# Patient Record
Sex: MALE | Race: WHITE | NOT HISPANIC OR LATINO | Employment: FULL TIME | URBAN - METROPOLITAN AREA
[De-identification: names, ages, dates, MRNs, and addresses within clinical notes are randomized per-mention and may not be internally consistent; named-entity substitution may affect disease eponyms.]

---

## 2015-12-31 LAB — HBA1C MFR BLD: 5.3 % (ref 4.8–5.6)

## 2018-02-06 ENCOUNTER — TRANSFERRED RECORDS (OUTPATIENT)
Dept: HEALTH INFORMATION MANAGEMENT | Facility: CLINIC | Age: 58
End: 2018-02-06

## 2018-10-29 ENCOUNTER — TRANSFERRED RECORDS (OUTPATIENT)
Dept: HEALTH INFORMATION MANAGEMENT | Facility: CLINIC | Age: 58
End: 2018-10-29

## 2019-02-08 ENCOUNTER — TRANSFERRED RECORDS (OUTPATIENT)
Dept: HEALTH INFORMATION MANAGEMENT | Facility: CLINIC | Age: 59
End: 2019-02-08

## 2019-04-09 ENCOUNTER — TRANSFERRED RECORDS (OUTPATIENT)
Dept: HEALTH INFORMATION MANAGEMENT | Facility: CLINIC | Age: 59
End: 2019-04-09

## 2019-10-28 ENCOUNTER — TRANSFERRED RECORDS (OUTPATIENT)
Dept: HEALTH INFORMATION MANAGEMENT | Facility: CLINIC | Age: 59
End: 2019-10-28

## 2019-10-28 LAB
CHOLEST SERPL-MCNC: 250 MG/DL (ref 0–200)
CREAT SERPL-MCNC: 1.4 MG/DL (ref 0.8–1.5)
GFR SERPL CREATININE-BSD FRML MDRD: 55 ML/MIN (ref 60–137)
GLUCOSE SERPL-MCNC: 101 MG/DL (ref 70–110)
HDLC SERPL-MCNC: 63 MG/DL (ref 34–100)
LDLC SERPL CALC-MCNC: 148 MG/DL (ref 75–130)
POTASSIUM SERPL-SCNC: 4.4 MMOL/L (ref 3.5–5.5)
TRIGL SERPL-MCNC: 195 MG/DL (ref 0–200)

## 2019-11-08 ENCOUNTER — TRANSFERRED RECORDS (OUTPATIENT)
Dept: HEALTH INFORMATION MANAGEMENT | Facility: CLINIC | Age: 59
End: 2019-11-08

## 2020-11-04 ENCOUNTER — OFFICE VISIT (OUTPATIENT)
Dept: FAMILY MEDICINE | Facility: CLINIC | Age: 60
End: 2020-11-04
Payer: COMMERCIAL

## 2020-11-04 VITALS
OXYGEN SATURATION: 99 % | HEIGHT: 74 IN | BODY MASS INDEX: 32.08 KG/M2 | WEIGHT: 250 LBS | TEMPERATURE: 96.9 F | HEART RATE: 86 BPM | SYSTOLIC BLOOD PRESSURE: 148 MMHG | DIASTOLIC BLOOD PRESSURE: 94 MMHG

## 2020-11-04 DIAGNOSIS — Z23 NEED FOR INFLUENZA VACCINATION: ICD-10-CM

## 2020-11-04 DIAGNOSIS — Z51.81 MEDICATION MONITORING ENCOUNTER: ICD-10-CM

## 2020-11-04 DIAGNOSIS — Z23 NEED FOR PNEUMOCOCCAL VACCINATION: ICD-10-CM

## 2020-11-04 DIAGNOSIS — Z12.11 SCREEN FOR COLON CANCER: ICD-10-CM

## 2020-11-04 DIAGNOSIS — E78.5 HYPERLIPIDEMIA LDL GOAL <130: ICD-10-CM

## 2020-11-04 DIAGNOSIS — Z00.00 ROUTINE GENERAL MEDICAL EXAMINATION AT A HEALTH CARE FACILITY: Primary | ICD-10-CM

## 2020-11-04 DIAGNOSIS — Z86.0100 HISTORY OF COLONIC POLYPS: ICD-10-CM

## 2020-11-04 DIAGNOSIS — M25.561 CHRONIC PAIN OF RIGHT KNEE: ICD-10-CM

## 2020-11-04 DIAGNOSIS — G89.29 CHRONIC PAIN OF RIGHT KNEE: ICD-10-CM

## 2020-11-04 DIAGNOSIS — Z12.5 SCREENING FOR PROSTATE CANCER: ICD-10-CM

## 2020-11-04 DIAGNOSIS — G25.81 RESTLESS LEGS SYNDROME (RLS): ICD-10-CM

## 2020-11-04 DIAGNOSIS — I10 HYPERTENSION GOAL BP (BLOOD PRESSURE) < 140/90: ICD-10-CM

## 2020-11-04 DIAGNOSIS — M1A.09X0 IDIOPATHIC CHRONIC GOUT OF MULTIPLE SITES WITHOUT TOPHUS: ICD-10-CM

## 2020-11-04 PROCEDURE — 90682 RIV4 VACC RECOMBINANT DNA IM: CPT | Performed by: FAMILY MEDICINE

## 2020-11-04 PROCEDURE — 99386 PREV VISIT NEW AGE 40-64: CPT | Mod: 25 | Performed by: FAMILY MEDICINE

## 2020-11-04 PROCEDURE — 90471 IMMUNIZATION ADMIN: CPT | Performed by: FAMILY MEDICINE

## 2020-11-04 PROCEDURE — 90732 PPSV23 VACC 2 YRS+ SUBQ/IM: CPT | Performed by: FAMILY MEDICINE

## 2020-11-04 PROCEDURE — 90472 IMMUNIZATION ADMIN EACH ADD: CPT | Performed by: FAMILY MEDICINE

## 2020-11-04 RX ORDER — ALLOPURINOL 300 MG/1
TABLET ORAL
COMMUNITY
Start: 2020-10-30 | End: 2020-11-04

## 2020-11-04 RX ORDER — LISINOPRIL 20 MG/1
20 TABLET ORAL DAILY
Qty: 90 TABLET | Refills: 3 | Status: SHIPPED | OUTPATIENT
Start: 2020-11-04 | End: 2022-01-19

## 2020-11-04 RX ORDER — ALLOPURINOL 300 MG/1
300 TABLET ORAL DAILY
Qty: 90 TABLET | Refills: 3 | Status: SHIPPED | OUTPATIENT
Start: 2020-11-04 | End: 2022-01-13

## 2020-11-04 RX ORDER — LISINOPRIL 20 MG/1
20 TABLET ORAL DAILY
COMMUNITY
Start: 2020-09-09 | End: 2020-11-04

## 2020-11-04 RX ORDER — ROPINIROLE 3 MG/1
3 TABLET, FILM COATED ORAL DAILY
COMMUNITY
Start: 2020-09-09 | End: 2020-11-04

## 2020-11-04 RX ORDER — ROPINIROLE 3 MG/1
3 TABLET, FILM COATED ORAL DAILY
Qty: 90 TABLET | Refills: 3 | Status: SHIPPED | OUTPATIENT
Start: 2020-11-04 | End: 2021-12-13

## 2020-11-04 SDOH — ECONOMIC STABILITY: FOOD INSECURITY: WITHIN THE PAST 12 MONTHS, YOU WORRIED THAT YOUR FOOD WOULD RUN OUT BEFORE YOU GOT MONEY TO BUY MORE.: NOT ASKED

## 2020-11-04 SDOH — ECONOMIC STABILITY: INCOME INSECURITY: HOW HARD IS IT FOR YOU TO PAY FOR THE VERY BASICS LIKE FOOD, HOUSING, MEDICAL CARE, AND HEATING?: NOT ASKED

## 2020-11-04 SDOH — HEALTH STABILITY: MENTAL HEALTH: HOW MANY STANDARD DRINKS CONTAINING ALCOHOL DO YOU HAVE ON A TYPICAL DAY?: NOT ASKED

## 2020-11-04 SDOH — HEALTH STABILITY: MENTAL HEALTH: HOW OFTEN DO YOU HAVE 6 OR MORE DRINKS ON ONE OCCASION?: NOT ASKED

## 2020-11-04 SDOH — HEALTH STABILITY: MENTAL HEALTH: HOW OFTEN DO YOU HAVE A DRINK CONTAINING ALCOHOL?: NOT ASKED

## 2020-11-04 SDOH — ECONOMIC STABILITY: FOOD INSECURITY: WITHIN THE PAST 12 MONTHS, THE FOOD YOU BOUGHT JUST DIDN'T LAST AND YOU DIDN'T HAVE MONEY TO GET MORE.: NOT ASKED

## 2020-11-04 SDOH — ECONOMIC STABILITY: TRANSPORTATION INSECURITY
IN THE PAST 12 MONTHS, HAS THE LACK OF TRANSPORTATION KEPT YOU FROM MEDICAL APPOINTMENTS OR FROM GETTING MEDICATIONS?: NOT ASKED

## 2020-11-04 SDOH — ECONOMIC STABILITY: TRANSPORTATION INSECURITY
IN THE PAST 12 MONTHS, HAS LACK OF TRANSPORTATION KEPT YOU FROM MEETINGS, WORK, OR FROM GETTING THINGS NEEDED FOR DAILY LIVING?: NOT ASKED

## 2020-11-04 ASSESSMENT — MIFFLIN-ST. JEOR: SCORE: 2008.74

## 2020-11-04 NOTE — PROGRESS NOTES
[facility]  [place]    SUBJECTIVE    CC: Skip Conner is an 60 year old male who presents for preventive health visit.     Patient has been advised of split billing requirements and indicates understanding: Yes     Healthy Habits:    Do you get at least three servings of calcium containing foods daily (dairy, green leafy vegetables, etc.)? yes    Amount of exercise or daily activities, outside of work: yard work    Problems taking medications regularly No    Medication side effects: No    Have you had an eye exam in the past two years? yes    Do you see a dentist twice per year? yes    Do you have sleep apnea, excessive snoring or daytime drowsiness?no    Today's PHQ-2 Score:   PHQ-2 ( 1999 [employer]) 11/4/2020   Q1: Little interest or pleasure in doing things 0   Q2: Feeling down, depressed or hopeless 0   PHQ-2 Score 0       Abuse: Current or Past(Physical, Sexual or Emotional)- No  Do you feel safe in your environment? Yes    Have you ever done Advance Care Planning? (For example, a Health Directive, POLST, or a discussion with a medical provider or your loved ones about your wishes): No, advance care planning information given to patient to review.  Advanced care planning was discussed at today's visit.    Social History     Tobacco Use     Smoking status: Never Smoker     Smokeless tobacco: Never Used   Substance Use Topics     Alcohol use: Yes     Alcohol/week: 7.0 standard drinks     Types: 7 Standard drinks or equivalent per week     Comment: 1 per day     If you drink alcohol do you typically have >3 drinks per day or >7 drinks per week? No                      Last PSA: No results found for: PSA    Reviewed orders with patient. Reviewed health maintenance and updated orders accordingly - Yes    Htn    BP Readings from Last 3 Encounters:   11/04/20 (!) 148/94     Lipids    No results for input(s): CHOL, HDL, LDL, TRIG, CHOLHDLRATIO in the last 15112 hours.    Gout    No issues,  allopurinol    RLS    Requip controls    Right knee pain long term, unable to due stairs, patella rotated, no redness, no warmth, swelling upper lateral, worse in am, better after hot shower, worse going down stairs    Health Maintenance     Colonoscopy:  Due 1/2024   FIT:  given              PSA:  pending   DEXA:  NA    Health Maintenance Due   Topic Date Due     HIV SCREENING  01/24/1974     HEPATITIS C SCREENING  01/24/1977     ZOSTER IMMUNIZATION (1 of 2) 01/24/2009       Current Problem List    Patient Active Problem List   Diagnosis     Hypertension goal BP (blood pressure) < 140/90     Hyperlipidemia LDL goal <130     Idiopathic chronic gout of 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     Restless legs syndrome (RLS)     History of colonic polyps       Past Medical History    Past Medical History:   Diagnosis Date     History of colonic polyps 01/2019     Hyperlipidemia LDL goal <130      Hypertension goal BP (blood pressure) < 140/90      Idiopathic chronic gout of 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      Restless legs syndrome (RLS)        Past Surgical History    Past Surgical History:   Procedure Laterality Date     APPENDECTOMY  2008     COLONOSCOPY  01/2019    colon polyp, due 5 yrs       Current Medications    Current Outpatient Medications   Medication Sig Dispense Refill     allopurinol (ZYLOPRIM) 300 MG tablet Take 1 tablet (300 mg) by mouth daily 90 tablet 3     lisinopril (ZESTRIL) 20 MG tablet Take 1 tablet (20 mg) by mouth daily 90 tablet 3     rOPINIRole (REQUIP) 3 MG tablet Take 1 tablet (3 mg) by mouth daily 90 tablet 3       Allergies    No Known Allergies    Immunizations    Immunization History   Administered Date(s) Administered     DTaP, Unspecified 01/30/2009     HepA-Adult 01/01/2000, 06/15/2000     HepB-Adult 01/01/1995, 03/01/1995, 06/15/1995     Influenza (IIV3) PF 10/03/2013     Influenza Quad, Recombinant, p-free (RIV4) 10/28/2019, 11/04/2020     Influenza Vaccine IM > 6 months Valent IIV4  10/14/2014     Pneumococcal 23 valent 11/04/2020     Td (Adult), Adsorbed 10/28/2019       Family History    Family History   Problem Relation Age of Onset     Arthritis Mother      Heart Disease Father      Hypertension Father        Social History    Social History     Socioeconomic History     Marital status:      Spouse name: Not on file     Number of children: 2     Years of education: 14     Highest education level: Not on file   Occupational History     Not on file   Social Needs     Financial resource strain: Not on file     Food insecurity     Worry: Not on file     Inability: Not on file     Transportation needs     Medical: Not on file     Non-medical: Not on file   Tobacco Use     Smoking status: Never Smoker     Smokeless tobacco: Never Used   Substance and Sexual Activity     Alcohol use: Yes     Alcohol/week: 7.0 standard drinks     Types: 7 Standard drinks or equivalent per week     Comment: 1 per day     Drug use: Never     Sexual activity: Yes   Lifestyle     Physical activity     Days per week: Not on file     Minutes per session: Not on file     Stress: Not on file   Relationships     Social connections     Talks on phone: Not on file     Gets together: Not on file     Attends [employer] service: Not on file     Active member of club or organization: Not on file     Attends meetings of clubs or organizations: Not on file     Relationship status: Not on file     Intimate partner violence     Fear of current or ex partner: Not on file     Emotionally abused: Not on file     Physically abused: Not on file     Forced sexual activity: Not on file   Other Topics Concern     Not on file   Social History Narrative     Not on file       ROS    CONSTITUTIONAL: NEGATIVE for fever, chills, change in weight  INTEGUMENTARY/SKIN: NEGATIVE for worrisome rashes, moles or lesions  EYES: NEGATIVE for vision changes or irritation  ENT/MOUTH: NEGATIVE for ear, mouth and throat problems  RESP: NEGATIVE for  "significant cough or SOB  CV: NEGATIVE for chest pain, palpitations or peripheral edema  GI: NEGATIVE for nausea, abdominal pain, heartburn, or change in bowel habits  : NEGATIVE for frequency, dysuria, or hematuria  MUSCULOSKELETAL: NEGATIVE for significant arthralgias or myalgia  NEURO: NEGATIVE for weakness, dizziness or paresthesias  ENDOCRINE: NEGATIVE for temperature intolerance, skin/hair changes  HEME: NEGATIVE for bleeding problems  PSYCHIATRIC: NEGATIVE for changes in mood or affect    OBJECTIVE    BP (!) 148/94   Pulse 86   Temp 96.9  F (36.1  C) (Tympanic)   Ht 1.88 m (6' 2\")   Wt 113.4 kg (250 lb)   SpO2 99%   BMI 32.10 kg/m      EXAM:    GENERAL: healthy, alert and no distress  EYES: Eyes grossly normal to inspection, PERRL and conjunctivae and sclerae normal  HENT: ear canals and TM's normal, nose and mouth without ulcers or lesions  NECK: no adenopathy, no asymmetry, masses, or scars and thyroid normal to palpation  RESP: lungs clear to auscultation - no rales, rhonchi or wheezes  CV: regular rate and rhythm, normal S1 S2, no S3 or S4, no murmur, click or rub, no peripheral edema and peripheral pulses strong  ABDOMEN: soft, nontender, no hepatosplenomegaly, no masses and bowel sounds normal   (male): testicles normal without atrophy or masses, no hernias and penis normal without urethral discharge  RECTAL: normal sphincter tone, no rectal masses and prostate of normal size for age, smooth, nontender without masses/nodules  MS: no gross musculoskeletal defects noted, no edema  SKIN: no suspicious lesions or rashes  NEURO: Normal strength and tone, mentation intact and speech normal  PSYCH: mentation appears normal, affect normal/bright  LYMPH: no cervical, supraclavicular, axillary, or inguinal adenopathy    DIAGNOSTICS/PROCEDURES    Pending    ASSESSMENT      ICD-10-CM    1. Routine general medical examination at a health care facility  Z00.00 Fecal colorectal cancer screen FIT     PPSV23, " IM/SUBQ (2+ YRS) - [id number]     Albumin Random Urine Quantitative with Creat Ratio     CBC with platelets     CK total     Comprehensive metabolic panel     Lipid panel reflex to direct LDL Fasting     Prostate spec antigen screen     TSH with free T4 reflex     UA reflex to Microscopic and Culture     Uric acid     CANCELED: Comprehensive metabolic panel     CANCELED: Lipid panel reflex to direct LDL Fasting     CANCELED: CK total     CANCELED: CBC with platelets     CANCELED: TSH with free T4 reflex     CANCELED: UA reflex to Microscopic and Culture     CANCELED: Albumin Random Urine Quantitative with Creat Ratio     CANCELED: Prostate spec antigen screen     CANCELED: Uric acid   2. Hypertension goal BP (blood pressure) < 140/90  I10 lisinopril (ZESTRIL) 20 MG tablet     Albumin Random Urine Quantitative with Creat Ratio     Comprehensive metabolic panel     TSH with free T4 reflex     UA reflex to Microscopic and Culture     CANCELED: Comprehensive metabolic panel     CANCELED: TSH with free T4 reflex     CANCELED: UA reflex to Microscopic and Culture     CANCELED: Albumin Random Urine Quantitative with Creat Ratio   3. Hyperlipidemia LDL goal <130  E78.5 CK total     Comprehensive metabolic panel     Lipid panel reflex to direct LDL Fasting     CANCELED: Comprehensive metabolic panel     CANCELED: Lipid panel reflex to direct LDL Fasting     CANCELED: CK total   4. Idiopathic chronic gout of 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  M1A.09X0 allopurinol (ZYLOPRIM) 300 MG tablet     Uric acid     CANCELED: Uric acid   5. Restless legs syndrome (RLS)  G25.81 rOPINIRole (REQUIP) 3 MG tablet     Comprehensive metabolic panel     CANCELED: Comprehensive metabolic panel   6. Chronic pain of right knee  M25.561 Orthopedic [facility] Spine  Referral    G89.29    7. History of colonic polyps  Z86.010 Fecal colorectal cancer screen FIT   8. Screen for colon cancer  Z12.11 Fecal colorectal cancer screen FIT   9. Screening for  prostate cancer  Z12.5 Prostate spec antigen screen     CANCELED: Prostate spec antigen screen   10. Need for pneumococcal vaccination  Z23 PPSV23, IM/SUBQ (2+ YRS) - [id number]   11. Need for influenza vaccination  Z23    12. Medication monitoring encounter  Z51.81 Albumin Random Urine Quantitative with Creat Ratio     CBC with platelets     CK total     Comprehensive metabolic panel     Lipid panel reflex to direct LDL Fasting     TSH with free T4 reflex     UA reflex to Microscopic and Culture     Uric acid     CANCELED: Comprehensive metabolic panel     CANCELED: Lipid panel reflex to direct LDL Fasting     CANCELED: CK total     CANCELED: CBC with platelets     CANCELED: TSH with free T4 reflex     CANCELED: UA reflex to Microscopic and Culture     CANCELED: Albumin Random Urine Quantitative with Creat Ratio     CANCELED: Uric acid       PLAN    Discussed treatment/modality options, including risk and benefits, he desires:    advised alcohol consumption 1oz per day or less, advised aspirin 81 mg po daily, advised 1 multivitamin per day, advised calcium 7413-5844 mg/d and Vitamin D 800-1200 IU/d, advised dentist every 6 months, advised diet, exercise, and weight loss, advised opthalmologist every 1-2 years, advised self testicular exam q month, further health care maintenance, further lab(s), immunization(s), medication refill(s) and observation    All diagnosis above reviewed and noted above, otherwise stable.      [facility]      1) heat/ice/stretching/tylenol, FSOC    2) med refills    3) labs    4) Flu/Pneumovax    5) Recheck BP soon    Return in about 1 month (around 12/4/2020), or if symptoms worsen or fail to improve, for BP Recheck, Follow Up Chronic, Medication Recheck Visit.    Health Maintenance Due   Topic Date Due     HIV SCREENING  01/24/1974     HEPATITIS C SCREENING  01/24/1977     ZOSTER IMMUNIZATION (1 of 2) 01/24/2009       COUNSELING    Reviewed preventive  "health counseling, as reflected in patient instructions    BP Readings from Last 1 Encounters:   11/04/20 (!) 148/94     Estimated body mass index is 32.1 kg/m  as calculated from the following:    Height as of this encounter: 1.88 m (6' 2\").    Weight as of this encounter: 113.4 kg (250 lb).    Recheck BP soon, low salt, regular exercise, weight low  Weight management plan: diet and exercise     reports that he has never smoked. He has never used smokeless tobacco.      Counseling Resources:    ATP IV Guidelines  Pooled Cohorts Equation Calculator  FRAX Risk Assessment  ICSI Preventive Guidelines  Dietary Guidelines for Americans, 2010  USDA's MyPlate  ASA Prophylaxis  Lung CA Screening           Kal Briceño MD, FAAFP     [facility]  [address] [place] [id number]  [email or web address]   Office: [phone]  Fax: [phone]  Pager: [phone]       "

## 2020-11-18 ENCOUNTER — OFFICE VISIT (OUTPATIENT)
Dept: ORTHOPEDICS | Facility: CLINIC | Age: 60
End: 2020-11-18
Attending: FAMILY MEDICINE
Payer: COMMERCIAL

## 2020-11-18 ENCOUNTER — ANCILLARY PROCEDURE (OUTPATIENT)
Dept: GENERAL RADIOLOGY | Facility: CLINIC | Age: 60
End: 2020-11-18
Attending: FAMILY MEDICINE
Payer: COMMERCIAL

## 2020-11-18 VITALS
WEIGHT: 250 LBS | DIASTOLIC BLOOD PRESSURE: 90 MMHG | BODY MASS INDEX: 32.08 KG/M2 | SYSTOLIC BLOOD PRESSURE: 142 MMHG | HEIGHT: 74 IN

## 2020-11-18 DIAGNOSIS — M25.561 CHRONIC PAIN OF RIGHT KNEE: ICD-10-CM

## 2020-11-18 DIAGNOSIS — G89.29 CHRONIC PAIN OF RIGHT KNEE: ICD-10-CM

## 2020-11-18 DIAGNOSIS — M17.11 PRIMARY OSTEOARTHRITIS OF RIGHT KNEE: Primary | ICD-10-CM

## 2020-11-18 PROCEDURE — 99204 OFFICE O/P NEW MOD 45 MIN: CPT | Mod: 25 | Performed by: FAMILY MEDICINE

## 2020-11-18 PROCEDURE — 73562 X-RAY EXAM OF KNEE 3: CPT | Performed by: FAMILY MEDICINE

## 2020-11-18 PROCEDURE — 20611 DRAIN/INJ JOINT/BURSA W/US: CPT | Mod: RT | Performed by: FAMILY MEDICINE

## 2020-11-18 RX ORDER — METHYLPREDNISOLONE ACETATE 40 MG/ML
40 INJECTION, SUSPENSION INTRA-ARTICULAR; INTRALESIONAL; INTRAMUSCULAR; SOFT TISSUE
Status: DISCONTINUED | OUTPATIENT
Start: 2020-11-18 | End: 2022-07-19

## 2020-11-18 RX ADMIN — METHYLPREDNISOLONE ACETATE 40 MG: 40 INJECTION, SUSPENSION INTRA-ARTICULAR; INTRALESIONAL; INTRAMUSCULAR; SOFT TISSUE at 16:29

## 2020-11-18 ASSESSMENT — MIFFLIN-ST. JEOR: SCORE: 2008.74

## 2020-11-18 NOTE — LETTER
11/18/2020         RE: Skip Conner  [address] [address] [facility] [address]        Dear Colleague,    Thank you for referring your patient, Skip Conner, to the [facility] [place]. Please see a copy of my visit note below.    ASSESSMENT & PLAN  Patient Instructions     1. Primary osteoarthritis of right knee    2. Chronic pain of right knee      -Patient has chronic knee pain mainly due to patellofemoral arthritis  -Patient tolerated drainage and cortisone injection today without complications  -Patient start formal physical therapy and home exercise pro-  -We will obtain prior authorization for Synvisc 1 injection.    -Patient will schedule follow-up appointment in the next 2 to 3 weeks to administer the Synvisc injection.  -Patient may continue with over-the-counter pain medications as needed  -Call direct clinic number [[phone]] at any time with questions or concerns.    Albert Yeo MD [facility]  [facility]          -----    SUBJECTIVE  Skip Conner is a/an 60 year old male who is seen in consultation at the request of  Kal Briceño M.D. for evaluation of right knee pain. The patient is seen by themselves.    Onset: 3 years(s) ago. Reports insidious onset without acute precipitating event.  has been diagnosed with osteoarthritis from his old pcp.  had a cortisone injection about 2 years ago, and  doesn't think that gave him any relief at all.   Location of Pain: has a lump on the anterolateral aspect of the patella, that has been there for 2 years.  is more of a stiffness than real pain  Rating of Pain at worst: 6/10  Rating of Pain Currently: 4/10  Worsened by: stairs, walking,   Better with: advil, and tylenol, aleve  Treatments tried: Tylenol, ibuprofen, Aleve and corticosteroid injection (most recent date: 2 years ago) that provided 0 hour(s) of relief  Associated symptoms: swelling,  weakness of leg and feeling of instability  Orthopedic history: NO  Relevant surgical history: NO  Social history: social history: works at health and , walks a lot at work.     Past Medical History:   Diagnosis Date     History of colonic polyps 01/2019     Hyperlipidemia LDL goal <130      Hypertension goal BP (blood pressure) < 140/90      Idiopathic chronic gout of 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      Restless legs syndrome (RLS)      Social History     Socioeconomic History     Marital status:      Spouse name: Not on file     Number of children: 2     Years of education: 14     Highest education level: Not on file   Occupational History     Not on file   Social Needs     Financial resource strain: Not on file     Food insecurity     Worry: Not on file     Inability: Not on file     Transportation needs     Medical: Not on file     Non-medical: Not on file   Tobacco Use     Smoking status: Never Smoker     Smokeless tobacco: Never Used   Substance and Sexual Activity     Alcohol use: Yes     Alcohol/week: 7.0 standard drinks     Types: 7 Standard drinks or equivalent per week     Comment: 1 per day     Drug use: Never     Sexual activity: Yes   Lifestyle     Physical activity     Days per week: Not on file     Minutes per session: Not on file     Stress: Not on file   Relationships     Social connections     Talks on phone: Not on file     Gets together: Not on file     Attends L[employer] service: Not on file     Active member of club or organization: Not on file     Attends meetings of clubs or organizations: Not on file     Relationship status: Not on file     Intimate partner violence     Fear of current or ex partner: Not on file     Emotionally abused: Not on file     Physically abused: Not on file     Forced sexual activity: Not on file   Other Topics Concern     Not on file   Social History Narrative     Not on file         Patient's past medical, surgical, social, and family  "histories were reviewed today and no changes are noted.    REVIEW OF SYSTEMS:  10 point ROS is negative other than symptoms noted above in HPI, Past Medical History or as stated below  Constitutional: NEGATIVE for fever, chills, change in weight  Skin: NEGATIVE for worrisome rashes, moles or lesions  GI/: NEGATIVE for bowel or bladder changes  Neuro: NEGATIVE for weakness, dizziness or paresthesias    OBJECTIVE:  BP (!) 142/90   Ht 1.88 m (6' 2\")   Wt 113.4 kg (250 lb)   BMI 32.10 kg/m     General: healthy, alert and in no distress  HEENT: no scleral icterus or conjunctival erythema  Skin: no suspicious lesions or rash. No jaundice.  CV: no pedal edema  Resp: normal respiratory effort without conversational dyspnea   Psych: normal mood and affect  Gait: normal steady gait with appropriate coordination and balance  Neuro: Normal light sensory exam of lower extremity  MSK:  RIGHT KNEE  Inspection:    normal alignment  Palpation:    Tender about the lateral patellar facet, medial patellar facet and medial joint line. Remainder of bony and ligamentous landmarks are nontender.    Moderate effusion is present    Patellofemoral crepitus is Present  Range of Motion:     00 extension to 1100 flexion  Strength:    Quadriceps grossly intact    Extensor mechanism intact  Special Tests:    Positive: none    Negative: MCL/valgus stress (0 & 30 deg), LCL/varus stress (0 & 30 deg), Lachman's, anterior drawer, posterior drawer, Corrine's    Independent visualization of the below image:  Recent Results (from the past 24 hour(s))   XR Knee Standing AP Bilat [place] Bilat Lat Right    Narrative    Mild medial compartment and mod patellofemoral compartment joint space   narrowing with lateral tilting and lateral patellar osteophyte.  No acute   fracture, dislocation.        Large Joint Injection/Arthocentesis: R knee joint    Date/Time: 11/18/2020 4:29 PM  Performed by: Yeo, Albert, MD  Authorized by: Yeo, Albert, MD "     Indications:  Pain and osteoarthritis  Needle Size:  25 G  Guidance: ultrasound    Approach:  Superolateral  Location:  Knee      Medications:  40 mg methylPREDNISolone 40 MG/ML  Aspirate amount (mL):  20  Aspirate:  Serous, yellow and blood-tinged  Outcome:  Tolerated well, no immediate complications  Procedure discussed: discussed risks, benefits, and alternatives    Consent Given by:  Patient  Timeout: timeout called immediately prior to procedure    Prep: patient was prepped and draped in usual sterile fashion                Albert Yeo MD [facility]        Again, thank you for allowing me to participate in the care of your patient.        Sincerely,        Albert Yeo, MD

## 2020-11-18 NOTE — PATIENT INSTRUCTIONS
1. Primary osteoarthritis of right knee    2. Chronic pain of right knee      -Patient has chronic knee pain mainly due to patellofemoral arthritis  -Patient tolerated drainage and cortisone injection today without complications  -Patient start formal physical therapy and home exercise pro-  -We will obtain prior authorization for Synvisc 1 injection.    -Patient will schedule follow-up appointment in the next 2 to 3 weeks to administer the Synvisc injection.  -Patient may continue with over-the-counter pain medications as needed  -Call direct clinic number [[phone]] at any time with questions or concerns.    Albert Yeo MD [facility]  [facility] [place]

## 2020-11-18 NOTE — NURSING NOTE
Michael to follow up with Primary Care provider regarding elevated blood pressure.  Rachell Albright MS, ATC

## 2020-11-18 NOTE — PROGRESS NOTES
ASSESSMENT & PLAN  Patient Instructions     1. Primary osteoarthritis of right knee    2. Chronic pain of right knee      -Patient has chronic knee pain mainly due to patellofemoral arthritis  -Patient tolerated drainage and cortisone injection today without complications  -Patient start formal physical therapy and home exercise pro-  -We will obtain prior authorization for Synvisc 1 injection.    -Patient will schedule follow-up appointment in the next 2 to 3 weeks to administer the Synvisc injection.  -Patient may continue with over-the-counter pain medications as needed  -Call direct clinic number [[phone]] at any time with questions or concerns.    Albert Yeo MD [facility] and Sports Medicine  [facility]          -----    SUBJECTIVE  Skip Conner is a/an 60 year old male who is seen in consultation at the request of  Kal Briceño M.D. for evaluation of right knee pain. The patient is seen by themselves.    Onset: 3 years(s) ago. Reports insidious onset without acute precipitating event.  has been diagnosed with osteoarthritis from his old pcp.  had a cortisone injection about 2 years ago, and  doesn't think that gave him any relief at all.   Location of Pain: has a lump on the anterolateral aspect of the patella, that has been there for 2 years. States is more of a stiffness than real pain  Rating of Pain at worst: 6/10  Rating of Pain Currently: 4/10  Worsened by: stairs, walking,   Better with: advil, and tylenol, aleve  Treatments tried: Tylenol, ibuprofen, Aleve and corticosteroid injection (most recent date: 2 years ago) that provided 0 hour(s) of relief  Associated symptoms: swelling, weakness of leg and feeling of instability  Orthopedic history: NO  Relevant surgical history: NO  Social history: social history: works at health and , walks a lot at work.     Past Medical History:   Diagnosis Date     History of colonic polyps 01/2019      Hyperlipidemia LDL goal <130      Hypertension goal BP (blood pressure) < 140/90      Idiopathic chronic gout of 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      Restless legs syndrome (RLS)      Social History     Socioeconomic History     Marital status:      Spouse name: Not on file     Number of children: 2     Years of education: 14     Highest education level: Not on file   Occupational History     Not on file   Social Needs     Financial resource strain: Not on file     Food insecurity     Worry: Not on file     Inability: Not on file     Transportation needs     Medical: Not on file     Non-medical: Not on file   Tobacco Use     Smoking status: Never Smoker     Smokeless tobacco: Never Used   Substance and Sexual Activity     Alcohol use: Yes     Alcohol/week: 7.0 standard drinks     Types: 7 Standard drinks or equivalent per week     Comment: 1 per day     Drug use: Never     Sexual activity: Yes   Lifestyle     Physical activity     Days per week: Not on file     Minutes per session: Not on file     Stress: Not on file   Relationships     Social connections     Talks on phone: Not on file     Gets together: Not on file     Attends Yazidi service: Not on file     Active member of club or organization: Not on file     Attends meetings of clubs or organizations: Not on file     Relationship status: Not on file     Intimate partner violence     Fear of current or ex partner: Not on file     Emotionally abused: Not on file     Physically abused: Not on file     Forced sexual activity: Not on file   Other Topics Concern     Not on file   Social History Narrative     Not on file         Patient's past medical, surgical, social, and family histories were reviewed today and no changes are noted.    REVIEW OF SYSTEMS:  10 point ROS is negative other than symptoms noted above in HPI, Past Medical History or as stated below  Constitutional: NEGATIVE for fever, chills, change in weight  Skin: NEGATIVE for worrisome  "rashes, moles or lesions  GI/: NEGATIVE for bowel or bladder changes  Neuro: NEGATIVE for weakness, dizziness or paresthesias    OBJECTIVE:  BP (!) 142/90   Ht 1.88 m (6' 2\")   Wt 113.4 kg (250 lb)   BMI 32.10 kg/m     General: healthy, alert and in no distress  HEENT: no scleral icterus or conjunctival erythema  Skin: no suspicious lesions or rash. No jaundice.  CV: no pedal edema  Resp: normal respiratory effort without conversational dyspnea   Psych: normal mood and affect  Gait: normal steady gait with appropriate coordination and balance  Neuro: Normal light sensory exam of lower extremity  MSK:  RIGHT KNEE  Inspection:    normal alignment  Palpation:    Tender about the lateral patellar facet, medial patellar facet and medial joint line. Remainder of bony and ligamentous landmarks are nontender.    Moderate effusion is present    Patellofemoral crepitus is Present  Range of Motion:     00 extension to 1100 flexion  Strength:    Quadriceps grossly intact    Extensor mechanism intact  Special Tests:    Positive: none    Negative: MCL/valgus stress (0 & 30 deg), LCL/varus stress (0 & 30 deg), Lachman's, anterior drawer, posterior drawer, Corrine's    Independent visualization of the below image:  Recent Results (from the past 24 hour(s))   XR Knee Standing AP Bilat [place] Bilat Lat Right    Narrative    Mild medial compartment and mod patellofemoral compartment joint space   narrowing with lateral tilting and lateral patellar osteophyte.  No acute   fracture, dislocation.        Large Joint Injection/Arthocentesis: R knee joint    Date/Time: 11/18/2020 4:29 PM  Performed by: Yeo, Albert, MD  Authorized by: Yeo, Albert, MD     Indications:  Pain and osteoarthritis  Needle Size:  25 G  Guidance: ultrasound    Approach:  Superolateral  Location:  Knee      Medications:  40 mg methylPREDNISolone 40 MG/ML  Aspirate amount (mL):  20  Aspirate:  Serous, yellow and blood-tinged  Outcome:  Tolerated well, no " immediate complications  Procedure discussed: discussed risks, benefits, and alternatives    Consent Given by:  Patient  Timeout: timeout called immediately prior to procedure    Prep: patient was prepped and draped in usual sterile fashion                Albert Yeo MD [facility]

## 2020-11-19 DIAGNOSIS — Z12.5 SCREENING FOR PROSTATE CANCER: ICD-10-CM

## 2020-11-19 DIAGNOSIS — M1A.09X0 IDIOPATHIC CHRONIC GOUT OF MULTIPLE SITES WITHOUT TOPHUS: ICD-10-CM

## 2020-11-19 DIAGNOSIS — G25.81 RESTLESS LEGS SYNDROME (RLS): ICD-10-CM

## 2020-11-19 DIAGNOSIS — I10 HYPERTENSION GOAL BP (BLOOD PRESSURE) < 140/90: ICD-10-CM

## 2020-11-19 DIAGNOSIS — Z51.81 MEDICATION MONITORING ENCOUNTER: ICD-10-CM

## 2020-11-19 DIAGNOSIS — Z00.00 ROUTINE GENERAL MEDICAL EXAMINATION AT A HEALTH CARE FACILITY: ICD-10-CM

## 2020-11-19 DIAGNOSIS — E78.5 HYPERLIPIDEMIA LDL GOAL <130: ICD-10-CM

## 2020-11-19 LAB
ALBUMIN SERPL-MCNC: 4 G/DL (ref 3.4–5)
ALBUMIN UR-MCNC: ABNORMAL MG/DL
ALP SERPL-CCNC: 83 U/L (ref 40–150)
ALT SERPL W P-5'-P-CCNC: 50 U/L (ref 0–70)
ANION GAP SERPL CALCULATED.3IONS-SCNC: 5 MMOL/L (ref 3–14)
APPEARANCE UR: CLEAR
AST SERPL W P-5'-P-CCNC: 25 U/L (ref 0–45)
BILIRUB SERPL-MCNC: 0.6 MG/DL (ref 0.2–1.3)
BILIRUB UR QL STRIP: NEGATIVE
BUN SERPL-MCNC: 20 MG/DL (ref 7–30)
CALCIUM SERPL-MCNC: 9.4 MG/DL (ref 8.5–10.1)
CHLORIDE SERPL-SCNC: 109 MMOL/L (ref 94–109)
CHOLEST SERPL-MCNC: 259 MG/DL
CK SERPL-CCNC: 120 U/L (ref 30–300)
CO2 SERPL-SCNC: 24 MMOL/L (ref 20–32)
COLOR UR AUTO: YELLOW
CREAT SERPL-MCNC: 1.06 MG/DL (ref 0.66–1.25)
CREAT UR-MCNC: 249 MG/DL
ERYTHROCYTE [DISTWIDTH] IN BLOOD BY AUTOMATED COUNT: 12 % (ref 10–15)
GFR SERPL CREATININE-BSD FRML MDRD: 75 ML/MIN/{1.73_M2}
GLUCOSE SERPL-MCNC: 146 MG/DL (ref 70–99)
GLUCOSE UR STRIP-MCNC: NEGATIVE MG/DL
HCT VFR BLD AUTO: 47 % (ref 40–53)
HDLC SERPL-MCNC: 66 MG/DL
HGB BLD-MCNC: 15.6 G/DL (ref 13.3–17.7)
HGB UR QL STRIP: ABNORMAL
KETONES UR STRIP-MCNC: NEGATIVE MG/DL
LDLC SERPL CALC-MCNC: 180 MG/DL
LEUKOCYTE ESTERASE UR QL STRIP: NEGATIVE
MCH RBC QN AUTO: 31 PG (ref 26.5–33)
MCHC RBC AUTO-ENTMCNC: 33.2 G/DL (ref 31.5–36.5)
MCV RBC AUTO: 93 FL (ref 78–100)
MICROALBUMIN UR-MCNC: 53 MG/L
MICROALBUMIN/CREAT UR: 21.12 MG/G CR (ref 0–17)
MUCOUS THREADS #/AREA URNS LPF: PRESENT /LPF
NITRATE UR QL: NEGATIVE
NONHDLC SERPL-MCNC: 193 MG/DL
PH UR STRIP: 6 PH (ref 5–7)
PLATELET # BLD AUTO: 260 10E9/L (ref 150–450)
POTASSIUM SERPL-SCNC: 4.3 MMOL/L (ref 3.4–5.3)
PROT SERPL-MCNC: 7.7 G/DL (ref 6.8–8.8)
PSA SERPL-ACNC: 0.49 UG/L (ref 0–4)
RBC # BLD AUTO: 5.04 10E12/L (ref 4.4–5.9)
RBC #/AREA URNS AUTO: ABNORMAL /HPF
SODIUM SERPL-SCNC: 138 MMOL/L (ref 133–144)
SOURCE: ABNORMAL
SP GR UR STRIP: 1.02 (ref 1–1.03)
TRIGL SERPL-MCNC: 64 MG/DL
TSH SERPL DL<=0.005 MIU/L-ACNC: 0.54 MU/L (ref 0.4–4)
URATE SERPL-MCNC: 5.3 MG/DL (ref 3.5–7.2)
UROBILINOGEN UR STRIP-ACNC: 0.2 EU/DL (ref 0.2–1)
WBC # BLD AUTO: 9.6 10E9/L (ref 4–11)
WBC #/AREA URNS AUTO: ABNORMAL /HPF

## 2020-11-19 PROCEDURE — 82043 UR ALBUMIN QUANTITATIVE: CPT | Performed by: FAMILY MEDICINE

## 2020-11-19 PROCEDURE — 80061 LIPID PANEL: CPT | Performed by: FAMILY MEDICINE

## 2020-11-19 PROCEDURE — 85027 COMPLETE CBC AUTOMATED: CPT | Performed by: FAMILY MEDICINE

## 2020-11-19 PROCEDURE — 36415 COLL VENOUS BLD VENIPUNCTURE: CPT | Performed by: FAMILY MEDICINE

## 2020-11-19 PROCEDURE — G0103 PSA SCREENING: HCPCS | Performed by: FAMILY MEDICINE

## 2020-11-19 PROCEDURE — 80053 COMPREHEN METABOLIC PANEL: CPT | Performed by: FAMILY MEDICINE

## 2020-11-19 PROCEDURE — 84550 ASSAY OF BLOOD/URIC ACID: CPT | Performed by: FAMILY MEDICINE

## 2020-11-19 PROCEDURE — 81001 URINALYSIS AUTO W/SCOPE: CPT | Performed by: FAMILY MEDICINE

## 2020-11-19 PROCEDURE — 82550 ASSAY OF CK (CPK): CPT | Performed by: FAMILY MEDICINE

## 2020-11-19 PROCEDURE — 84443 ASSAY THYROID STIM HORMONE: CPT | Performed by: FAMILY MEDICINE

## 2020-11-20 ENCOUNTER — TELEPHONE (OUTPATIENT)
Dept: FAMILY MEDICINE | Facility: CLINIC | Age: 60
End: 2020-11-20

## 2020-11-20 NOTE — TELEPHONE ENCOUNTER
General Call:     Who is calling:  Patient    Reason for Call:  Patient missed call for lab results. Tried to connect with Triage but they're probably at lunch. He's at work but will try to be available to take the call.    Okay to leave a detailed message:Yes at Cell number on file:    Telephone Information:   Mobile [phone]

## 2020-11-20 NOTE — TELEPHONE ENCOUNTER
Non-detailed message left for patient to return call  RIGOBERTO PorterN, RN  [employer] Triage

## 2020-11-25 DIAGNOSIS — Z12.11 COLON CANCER SCREENING: ICD-10-CM

## 2020-11-25 PROCEDURE — 82274 ASSAY TEST FOR BLOOD FECAL: CPT | Performed by: FAMILY MEDICINE

## 2020-11-27 LAB — HEMOCCULT STL QL IA: NEGATIVE

## 2020-11-30 ENCOUNTER — THERAPY VISIT (OUTPATIENT)
Dept: PHYSICAL THERAPY | Facility: CLINIC | Age: 60
End: 2020-11-30
Attending: FAMILY MEDICINE
Payer: COMMERCIAL

## 2020-11-30 DIAGNOSIS — M17.11 PRIMARY OSTEOARTHRITIS OF RIGHT KNEE: ICD-10-CM

## 2020-11-30 PROCEDURE — 97161 PT EVAL LOW COMPLEX 20 MIN: CPT | Mod: GP | Performed by: PHYSICAL THERAPIST

## 2020-11-30 PROCEDURE — 97110 THERAPEUTIC EXERCISES: CPT | Mod: GP | Performed by: PHYSICAL THERAPIST

## 2020-11-30 ASSESSMENT — ACTIVITIES OF DAILY LIVING (ADL)
WALK: ACTIVITY IS NOT DIFFICULT
RAW_SCORE: 57
RISE FROM A CHAIR: ACTIVITY IS MINIMALLY DIFFICULT
KNEEL ON THE FRONT OF YOUR KNEE: ACTIVITY IS NOT DIFFICULT
HOW_WOULD_YOU_RATE_THE_OVERALL_FUNCTION_OF_YOUR_KNEE_DURING_YOUR_USUAL_DAILY_ACTIVITIES?: NEARLY NORMAL
GO DOWN STAIRS: ACTIVITY IS MINIMALLY DIFFICULT
PAIN: THE SYMPTOM AFFECTS MY ACTIVITY SLIGHTLY
AS_A_RESULT_OF_YOUR_KNEE_INJURY,_HOW_WOULD_YOU_RATE_YOUR_CURRENT_LEVEL_OF_DAILY_ACTIVITY?: NEARLY NORMAL
HOW_WOULD_YOU_RATE_THE_CURRENT_FUNCTION_OF_YOUR_KNEE_DURING_YOUR_USUAL_DAILY_ACTIVITIES_ON_A_SCALE_FROM_0_TO_100_WITH_100_BEING_YOUR_LEVEL_OF_KNEE_FUNCTION_PRIOR_TO_YOUR_INJURY_AND_0_BEING_THE_INABILITY_TO_PERFORM_ANY_OF_YOUR_USUAL_DAILY_ACTIVITIES?: 80
WEAKNESS: THE SYMPTOM AFFECTS MY ACTIVITY SLIGHTLY
LIMPING: I DO NOT HAVE THE SYMPTOM
GIVING WAY, BUCKLING OR SHIFTING OF KNEE: I HAVE THE SYMPTOM BUT IT DOES NOT AFFECT MY ACTIVITY
KNEE_ACTIVITY_OF_DAILY_LIVING_SCORE: 81.43
GO UP STAIRS: ACTIVITY IS MINIMALLY DIFFICULT
STIFFNESS: THE SYMPTOM AFFECTS MY ACTIVITY SLIGHTLY
STAND: ACTIVITY IS NOT DIFFICULT
SIT WITH YOUR KNEE BENT: ACTIVITY IS NOT DIFFICULT
SWELLING: I HAVE THE SYMPTOM BUT IT DOES NOT AFFECT MY ACTIVITY
KNEE_ACTIVITY_OF_DAILY_LIVING_SUM: 57
SQUAT: ACTIVITY IS SOMEWHAT DIFFICULT

## 2020-11-30 NOTE — PROGRESS NOTES
[facility] Initial Evaluation  Subjective:    Therapist Generated HPI Evaluation  Problem details: Michael is being seen today for right knee osteoarthritis..         Type of problem:  Right knee.    This is a chronic (about 2 years) condition.  Condition occurred with:  Degenerative joint disease.  Where condition occurred: for unknown reasons.  Site of Pain: under the knee cap, sometimes lateral.  Pain is described as aching (4/10) and is intermittent.  Pain is the same all the time.  Since onset symptoms are rapidly improving (since injection).  Associated symptoms:  Loss of motion/stiffness and loss of strength. Symptoms are exacerbated by bending/squatting, ascending stairs, descending stairs, weight bearing and walking (stiffness after sitting in chair after activity)  Relieved by: injection.  Special tests included:  X-ray.  Previous treatment includes other (drained swelling and underwent injection on 11/18/2020). There was significant improvement following previous treatment.                          Objective:  Standing Alignment:              Knee:  Normal      Gait:    Gait Type:  Normal   Assistive Devices:  None      Flexibility/Screens:   Positive screens:  Knee                                                     Hip Evaluation    Hip Strength:    Flexion:   Left: 5/5   Pain:  Right: 5-/5   Pain:                      Abduction:  Left: 5/5     Pain:Right: 4+/5    Pain:      External Rotation:  Left: 5/5   Pain:  Right: 5-/5   Pain:  Knee Flexion:  Left: 5-/5   Pain:Right: 5-/5   Pain:  Knee Extension:  Left: 5/5   Pain:Right: 5/5    Pain:          Hip Palpation:  Normal              Knee Evaluation:  ROM:    AROM    Hyperextension:  Left:  3    Right: 4    Flexion: Left: 135    Right: 135                Edema:  Edema of the knee: 1+ joint effusion on right knee.    Mobility Testing:  Normal            Functional Testing:  normal                  General     ROS    Assessment/Plan:     Patient is a 60 year old male with right side knee complaints.    Patient has the following significant findings with corresponding treatment plan.                Diagnosis 1:  Right Knee Pain, OA  Pain -  hot/cold therapy, self management, education and home program  Decreased strength - therapeutic exercise and therapeutic activities  Impaired muscle performance - neuro re-education  Decreased function - therapeutic activities    Therapy Evaluation Codes:   1) History comprised of:   Personal factors that impact the plan of care:      Time since onset of symptoms.    Comorbidity factors that impact the plan of care are:      High blood pressure.     Medications impacting care: None.  2) Examination of Body Systems comprised of:   Body structures and functions that impact the plan of care:      Knee.   Activity limitations that impact the plan of care are:      Squatting/kneeling, Stairs, Standing, Walking and Sleeping.  3) Clinical presentation characteristics are:   Stable/Uncomplicated.  4) Decision-Making    Low complexity using standardized patient assessment instrument and/or measureable assessment of functional outcome.  Cumulative Therapy Evaluation is: Low complexity.    Previous and current functional limitations:  (See Goal Flow Sheet for this information)    Short term and Long term goals: (See Goal Flow Sheet for this information)     Communication ability:  Patient appears to be able to clearly communicate and understand verbal and written communication and follow directions correctly.  Treatment Explanation - The following has been discussed with the patient:   RX ordered/plan of care  Anticipated outcomes  Possible risks and side effects  This patient would benefit from PT intervention to resume normal activities.   Rehab potential is excellent.    Frequency:  1 X week, once daily  Duration:  for 8 weeks  Discharge Plan:  Achieve all LTG.  Independent in home treatment program.  Reach maximal  therapeutic benefit.    Please refer to the daily flowsheet for treatment today, total treatment time and time spent performing 1:1 timed codes.

## 2020-12-01 PROBLEM — M17.11 PRIMARY OSTEOARTHRITIS OF RIGHT KNEE: Status: ACTIVE | Noted: 2020-12-01

## 2020-12-04 ENCOUNTER — TELEPHONE (OUTPATIENT)
Dept: FAMILY MEDICINE | Facility: CLINIC | Age: 60
End: 2020-12-04

## 2020-12-04 NOTE — TELEPHONE ENCOUNTER
Health Care Directive received in clinic for patient Skip Conner. Document has been notarized.  Health Maintenance updated.  Document sent to HIM for scanning.  Ernestine Rosas RN

## 2020-12-09 ENCOUNTER — OFFICE VISIT (OUTPATIENT)
Dept: ORTHOPEDICS | Facility: CLINIC | Age: 60
End: 2020-12-09
Payer: COMMERCIAL

## 2020-12-09 DIAGNOSIS — M17.11 PRIMARY OSTEOARTHRITIS OF RIGHT KNEE: Primary | ICD-10-CM

## 2020-12-09 DIAGNOSIS — G89.29 CHRONIC PAIN OF RIGHT KNEE: ICD-10-CM

## 2020-12-09 DIAGNOSIS — M25.561 CHRONIC PAIN OF RIGHT KNEE: ICD-10-CM

## 2020-12-09 PROCEDURE — 20611 DRAIN/INJ JOINT/BURSA W/US: CPT | Mod: RT | Performed by: FAMILY MEDICINE

## 2020-12-09 NOTE — PROGRESS NOTES
ASSESSMENT & PLAN  Patient Instructions     1. Primary osteoarthritis of right knee    2. Chronic pain of right knee      -Patient has right knee pain and swelling due to arthritis  -Patient tolerated Synvisc 1 injection today without complications.  Patient was given postprocedure instructions  -Patient will follow up when pain returns for repeat treatment  -Call direct clinic number [[phone]] at any time with questions or concerns.    Albert Yeo MD [facility] Orthopedics and Sports Medicine  [facility]          -----    SUBJECTIVE:  Sikp Conner is a 60 year old male who is seen for right knee synvisc injection.      Large Joint Injection/Arthocentesis: R knee joint    Date/Time: 12/9/2020 2:45 PM  Performed by: Yeo, Albert, MD  Authorized by: Yeo, Albert, MD     Indications:  Pain and osteoarthritis  Needle Size:  22 G  Guidance: ultrasound    Approach:  Superolateral  Location:  Knee      Medications:  48 mg hylan 48 MG/6ML  Aspirate amount (mL):  22  Aspirate:  Serous and yellow  Outcome:  Tolerated well, no immediate complications  Procedure discussed: discussed risks, benefits, and alternatives    Consent Given by:  Patient  Timeout: timeout called immediately prior to procedure    Prep: patient was prepped and draped in usual sterile fashion            Albert Yeo MD, [facility] Orthopedic Care

## 2020-12-09 NOTE — LETTER
12/9/2020         RE: Skip Conner  [address] [address] [facility] [address]        Dear Colleague,    Thank you for referring your patient, Skip Conner, to the [facility] [place]. Please see a copy of my visit note below.    ASSESSMENT & PLAN  Patient Instructions     1. Primary osteoarthritis of right knee    2. Chronic pain of right knee      -Patient has right knee pain and swelling due to arthritis  -Patient tolerated Synvisc 1 injection today without complications.  Patient was given postprocedure instructions  -Patient will follow up when pain returns for repeat treatment  -Call direct clinic number [[phone]] at any time with questions or concerns.    Albert Yeo MD [facility] Orthopedics and Sports Medicine  [facility]          -----    SUBJECTIVE:  Skip Conner is a 60 year old male who is seen for right knee synvisc injection.      Large Joint Injection/Arthocentesis: R knee joint    Date/Time: 12/9/2020 2:45 PM  Performed by: Yeo, Albert, MD  Authorized by: Yeo, Albert, MD     Indications:  Pain and osteoarthritis  Needle Size:  22 G  Guidance: ultrasound    Approach:  Superolateral  Location:  Knee      Medications:  48 mg hylan 48 MG/6ML  Aspirate amount (mL):  22  Aspirate:  Serous and yellow  Outcome:  Tolerated well, no immediate complications  Procedure discussed: discussed risks, benefits, and alternatives    Consent Given by:  Patient  Timeout: timeout called immediately prior to procedure    Prep: patient was prepped and draped in usual sterile fashion            Albert Yeo MD, [facility] Orthopedic Care        Again, thank you for allowing me to participate in the care of your patient.        Sincerely,        Albert Yeo, MD

## 2020-12-09 NOTE — PATIENT INSTRUCTIONS
1. Primary osteoarthritis of right knee    2. Chronic pain of right knee      -Patient has right knee pain and swelling due to arthritis  -Patient tolerated Synvisc 1 injection today without complications.  Patient was given postprocedure instructions  -Patient will follow up when pain returns for repeat treatment  -Call direct clinic number [[phone]] at any time with questions or concerns.    Albert Yeo MD [facility]  [facility] [place]

## 2021-01-08 ENCOUNTER — OFFICE VISIT (OUTPATIENT)
Dept: ORTHOPEDICS | Facility: CLINIC | Age: 61
End: 2021-01-08
Payer: COMMERCIAL

## 2021-01-08 VITALS
DIASTOLIC BLOOD PRESSURE: 102 MMHG | SYSTOLIC BLOOD PRESSURE: 160 MMHG | HEIGHT: 74 IN | WEIGHT: 250 LBS | BODY MASS INDEX: 32.08 KG/M2

## 2021-01-08 DIAGNOSIS — M17.11 PRIMARY OSTEOARTHRITIS OF RIGHT KNEE: Primary | ICD-10-CM

## 2021-01-08 PROCEDURE — 99213 OFFICE O/P EST LOW 20 MIN: CPT | Performed by: FAMILY MEDICINE

## 2021-01-08 ASSESSMENT — MIFFLIN-ST. JEOR: SCORE: 2008.74

## 2021-01-08 NOTE — PROGRESS NOTES
"ASSESSMENT & PLAN    1. Primary osteoarthritis of right knee      Reviewed xray - advanced kneecap arthritis  Current pain is related to arthritis  No concern for meniscal or ligament tearing  Recommend giving the Synvisc injection another 1-2 weeks to see if there is any benefit  Rx for Voltaren  If pain is not improved in 2 weeks recommend following up with Dr. Yeo to discuss next steps. Can do this with a virtual visit. Call [phone] to schedule this appointment.    -----    SUBJECTIVE:  Skip Conner is a 60 year old male who is seen in follow-up for right knee pain.They were last seen by Dr. Yeo on 12/9/20 and had US guided right knee intra articular aspiration and Synvisc One injection.     Since their last visit reports 0% - (About the same as last time). Patient reports no relief after the injection. He notes pain is located in right medial knee. He reports that he also feels a sharp pain \"underneath the knee cap\". He reports these locations of pain are new and that he usually feels pain in the right lateral knee. Pain increases with going up and down stairs. They indicate that their current pain level is 6/10. They have tried rest/activity avoidance, Tylenol, Aleve and previous imaging (xray 11/18/20).      The patient is seen by themselves.    Patient's past medical, surgical, social, and family histories were reviewed today and no pertinent history related to patient's presenting problem.    REVIEW OF SYSTEMS:  Constitutional: NEGATIVE for fever, chills, change in weight  Skin: NEGATIVE for worrisome rashes, moles or lesions  GI/: NEGATIVE for bowel or bladder changes  Neuro: NEGATIVE for weakness, dizziness or paresthesias    OBJECTIVE:  BP (!) 160/102   Ht 1.88 m (6' 2\")   Wt 113.4 kg (250 lb)   BMI 32.10 kg/m     General: healthy, alert and in no distress  HEENT: no scleral icterus or conjunctival erythema  Skin: no suspicious lesions or rash. No jaundice.  CV: regular rhythm by palpation, no " pedal edema  Resp: normal respiratory effort without conversational dyspnea   Psych: normal mood and affect  Gait: normal steady gait with appropriate coordination and balance  Neuro: normal light touch sensory exam of the extremities.    MSK:  RIGHT KNEE  Palpation:    NonTender about the lateral patellar facet, medial patellar facet, lateral joint line and medial joint line. Remainder of bony and ligamentous landmarks are nontender.    Trace effusion is present    Patellofemoral crepitus is Present  Range of Motion:     -30 extension to 1200 flexion  Strength:    Extensor mechanism intact  Special Tests:    Negative: Lachman's, Corrine's, grind (very little patellar mobility due to severity of osteoarthritis    Independent visualization of the below image:  Results for orders placed or performed in visit on 11/18/20   XR Knee Standing AP Bilat [place] Bilat Lat Right    Narrative    Mild medial compartment and mod patellofemoral compartment joint space   narrowing with lateral tilting and lateral patellar osteophyte.  No acute   fracture, dislocation.     Makenna Cano DO [facility]opedic Care

## 2021-01-08 NOTE — PATIENT INSTRUCTIONS
1. Primary osteoarthritis of right knee      Reviewed xray - advanced kneecap arthritis  Current pain is related to your arthritis  No concern for meniscal or ligament tearing  Recommend giving the Synvisc injection another 1-2 weeks to see if you get any benefit  Rx for Voltaren to see if that helps  If pain is not improved in 2 weeks recommend following up with Dr. Yeo to discuss next steps. Can do this with a virtual visit. Call [phone] to schedule this appointment.

## 2021-01-08 NOTE — LETTER
"    1/8/2021         RE: Skip Conner  [address] [address] [address]        Dear Colleague,    Thank you for referring your patient, Skip Conner, to the [facility] [place]. Please see a copy of my visit note below.    ASSESSMENT & PLAN    1. Primary osteoarthritis of right knee      Reviewed xray - advanced kneecap arthritis  Current pain is related to arthritis  No concern for meniscal or ligament tearing  Recommend giving the Synvisc injection another 1-2 weeks to see if there is any benefit  Rx for Voltaren  If pain is not improved in 2 weeks recommend following up with Dr. Yeo to discuss next steps. Can do this with a virtual visit. Call [phone] to schedule this appointment.    -----    SUBJECTIVE:  Skip Conner is a 60 year old male who is seen in follow-up for right knee pain.They were last seen by Dr. Yeo on 12/9/20 and had US guided right knee intra articular aspiration and Synvisc One injection.     Since their last visit reports 0% - (About the same as last time). Patient reports no relief after the injection. He notes pain is located in right medial knee. He reports that he also feels a sharp pain \"underneath the knee cap\". He reports these locations of pain are new and that he usually feels pain in the right lateral knee. Pain increases with going up and down stairs. They indicate that their current pain level is 6/10. They have tried rest/activity avoidance, Tylenol, Aleve and previous imaging (xray 11/18/20).      The patient is seen by themselves.    Patient's past medical, surgical, social, and family histories were reviewed today and no pertinent history related to patient's presenting problem.    REVIEW OF SYSTEMS:  Constitutional: NEGATIVE for fever, chills, change in weight  Skin: NEGATIVE for worrisome rashes, moles or lesions  GI/: NEGATIVE for bowel or bladder changes  Neuro: NEGATIVE for weakness, dizziness or " "paresthesias    OBJECTIVE:  BP (!) 160/102   Ht 1.88 m (6' 2\")   Wt 113.4 kg (250 lb)   BMI 32.10 kg/m     General: healthy, alert and in no distress  HEENT: no scleral icterus or conjunctival erythema  Skin: no suspicious lesions or rash. No jaundice.  CV: regular rhythm by palpation, no pedal edema  Resp: normal respiratory effort without conversational dyspnea   Psych: normal mood and affect  Gait: normal steady gait with appropriate coordination and balance  Neuro: normal light touch sensory exam of the extremities.    MSK:  RIGHT KNEE  Palpation:    NonTender about the lateral patellar facet, medial patellar facet, lateral joint line and medial joint line. Remainder of bony and ligamentous landmarks are nontender.    Trace effusion is present    Patellofemoral crepitus is Present  Range of Motion:     -30 extension to 1200 flexion  Strength:    Extensor mechanism intact  Special Tests:    Negative: Lachman's, Corrine's, grind (very little patellar mobility due to severity of osteoarthritis    Independent visualization of the below image:  Results for orders placed or performed in visit on 11/18/20   XR Knee Standing AP Bilat [place] Bilat Lat Right    Narrative    Mild medial compartment and mod patellofemoral compartment joint space   narrowing with lateral tilting and lateral patellar osteophyte.  No acute   fracture, dislocation.     Makenna Cano DO [facility] Orthopedic Care            Again, thank you for allowing me to participate in the care of your patient.        Sincerely,        Makenna Cano DO    "

## 2021-01-12 ENCOUNTER — DOCUMENTATION ONLY (OUTPATIENT)
Dept: OTHER | Facility: CLINIC | Age: 61
End: 2021-01-12

## 2021-02-11 PROBLEM — M17.11 PRIMARY OSTEOARTHRITIS OF RIGHT KNEE: Status: RESOLVED | Noted: 2020-12-01 | Resolved: 2021-02-11

## 2021-03-08 ENCOUNTER — DOCUMENTATION ONLY (OUTPATIENT)
Dept: OTHER | Facility: CLINIC | Age: 61
End: 2021-03-08

## 2021-03-16 ENCOUNTER — OFFICE VISIT (OUTPATIENT)
Dept: ORTHOPEDICS | Facility: CLINIC | Age: 61
End: 2021-03-16
Payer: COMMERCIAL

## 2021-03-16 VITALS — BODY MASS INDEX: 32.08 KG/M2 | WEIGHT: 250 LBS | HEIGHT: 74 IN

## 2021-03-16 DIAGNOSIS — M17.11 PRIMARY OSTEOARTHRITIS OF RIGHT KNEE: Primary | ICD-10-CM

## 2021-03-16 PROCEDURE — 99213 OFFICE O/P EST LOW 20 MIN: CPT | Mod: 25 | Performed by: FAMILY MEDICINE

## 2021-03-16 PROCEDURE — 20611 DRAIN/INJ JOINT/BURSA W/US: CPT | Mod: RT | Performed by: FAMILY MEDICINE

## 2021-03-16 RX ORDER — METHYLPREDNISOLONE ACETATE 40 MG/ML
40 INJECTION, SUSPENSION INTRA-ARTICULAR; INTRALESIONAL; INTRAMUSCULAR; SOFT TISSUE
Status: DISCONTINUED | OUTPATIENT
Start: 2021-03-16 | End: 2022-07-19

## 2021-03-16 RX ADMIN — METHYLPREDNISOLONE ACETATE 40 MG: 40 INJECTION, SUSPENSION INTRA-ARTICULAR; INTRALESIONAL; INTRAMUSCULAR; SOFT TISSUE at 14:29

## 2021-03-16 ASSESSMENT — MIFFLIN-ST. JEOR: SCORE: 2003.74

## 2021-03-16 NOTE — LETTER
"    3/16/2021         RE: Skip Conner  [address] [facility] [address]        Dear Colleague,    Thank you for referring your patient, Skip Conner, to the [facility] [place]. Please see a copy of my visit note below.    ASSESSMENT & PLAN  Patient Instructions     1. Primary osteoarthritis of right knee      -Patient is following up for right knee pain due to arthritis  -Patient tolerated cortisone injection without complications  -Patient reports minimal to no pain relief with Synvisc injection  -Patient will follow up when pain and swelling returns.  Likely to consider MRI at the next visit depending on when patient returns  -Call direct clinic number [[phone]] at any time with questions or concerns.    Albert Yeo MD [facility] and Sports Medicine  [facility] [place]          -----    SUBJECTIVE:  Skip Conner is a 61 year old male who is seen in follow-up for right knee pain.They were last seen 12/9/2020 .     Since their last visit reports 0% - (About the same as last time). Was getting better using Voltaren gel, but has been crawling around on knee pads and states knees are more painful.  They indicate that their current pain level is 6/10. They have tried Tylenol, Aleve, other medications: Voltaren gel and viscosupplementation injection (most recent date: 12/9/20).      The patient is seen by themselves.    Patient's past medical, surgical, social, and family histories were reviewed today and no changes are noted.    REVIEW OF SYSTEMS:  Constitutional: NEGATIVE for fever, chills, change in weight  Skin: NEGATIVE for worrisome rashes, moles or lesions  GI/: NEGATIVE for bowel or bladder changes  Neuro: NEGATIVE for weakness, dizziness or paresthesias    OBJECTIVE:  Ht 1.88 m (6' 2\")   Wt 113.4 kg (250 lb)   BMI 32.10 kg/m     General: healthy, alert and in no distress  HEENT: no scleral icterus or conjunctival " erythema  Skin: no suspicious lesions or rash. No jaundice.  CV: regular rhythm by palpation, no pedal edema  Resp: normal respiratory effort without conversational dyspnea   Psych: normal mood and affect  Gait: normal steady gait with appropriate coordination and balance  Neuro: normal light touch sensory exam of the extremities.    MSK:  RIGHT KNEE  Palpation:    NonTender about the lateral patellar facet, medial patellar facet, lateral joint line and medial joint line. Remainder of bony and ligamentous landmarks are nontender.    Trace effusion is present    Patellofemoral crepitus is Present  Range of Motion:     -30 extension to 1200 flexion  Strength:    Extensor mechanism intact  Special Tests:    Negative: Lachman's, Corrine's, grind (very little patellar mobility due to severity of osteoarthritis    Independent visualization of the below image:    Large Joint Injection/Arthocentesis: R knee joint    Date/Time: 3/16/2021 2:29 PM  Performed by: Yeo, Albert, MD  Authorized by: Yeo, Albert, MD     Indications:  Pain and osteoarthritis  Needle Size:  25 G  Guidance: ultrasound    Approach:  Superolateral  Location:  Knee      Medications:  40 mg methylPREDNISolone 40 MG/ML  Aspirate amount (mL):  16  Aspirate:  Serous and yellow  Outcome:  Tolerated well, no immediate complications  Procedure discussed: discussed risks, benefits, and alternatives    Consent Given by:  Patient  Timeout: timeout called immediately prior to procedure    Prep: patient was prepped and draped in usual sterile fashion            Patient's conditions were thoroughly discussed during today's visit with greater than 50% of the visit spent counseling the patient with total time spent face-to-face with the patient being 20 minutes.    Albert Yeo MD, [facility]            Again, thank you for allowing me to participate in the care of your patient.        Sincerely,        Albert Yeo, MD

## 2021-03-16 NOTE — PROGRESS NOTES
"ASSESSMENT & PLAN  Patient Instructions     1. Primary osteoarthritis of right knee      -Patient is following up for right knee pain due to arthritis  -Patient tolerated cortisone injection without complications  -Patient reports minimal to no pain relief with Synvisc injection  -Patient will follow up when pain and swelling returns.  Likely to consider MRI at the next visit depending on when patient returns  -Call direct clinic number [[phone]] at any time with questions or concerns.    Albert Yeo MD [facility] Orthopedics and Sports Medicine  [facility]          -----    SUBJECTIVE:  Skip Conner is a 61 year old male who is seen in follow-up for right knee pain.They were last seen 12/9/2020 .     Since their last visit reports 0% - (About the same as last time). Was getting better using Voltaren gel, but has been crawling around on knee pads and states knees are more painful.  They indicate that their current pain level is 6/10. They have tried Tylenol, Aleve, other medications: Voltaren gel and viscosupplementation injection (most recent date: 12/9/20).      The patient is seen by themselves.    Patient's past medical, surgical, social, and family histories were reviewed today and no changes are noted.    REVIEW OF SYSTEMS:  Constitutional: NEGATIVE for fever, chills, change in weight  Skin: NEGATIVE for worrisome rashes, moles or lesions  GI/: NEGATIVE for bowel or bladder changes  Neuro: NEGATIVE for weakness, dizziness or paresthesias    OBJECTIVE:  Ht 1.88 m (6' 2\")   Wt 113.4 kg (250 lb)   BMI 32.10 kg/m     General: healthy, alert and in no distress  HEENT: no scleral icterus or conjunctival erythema  Skin: no suspicious lesions or rash. No jaundice.  CV: regular rhythm by palpation, no pedal edema  Resp: normal respiratory effort without conversational dyspnea   Psych: normal mood and affect  Gait: normal steady gait with appropriate coordination and balance  Neuro: " normal light touch sensory exam of the extremities.    MSK:  RIGHT KNEE  Palpation:    NonTender about the lateral patellar facet, medial patellar facet, lateral joint line and medial joint line. Remainder of bony and ligamentous landmarks are nontender.    Trace effusion is present    Patellofemoral crepitus is Present  Range of Motion:     -30 extension to 1200 flexion  Strength:    Extensor mechanism intact  Special Tests:    Negative: Lachman's, Corrine's, grind (very little patellar mobility due to severity of osteoarthritis    Independent visualization of the below image:    Large Joint Injection/Arthocentesis: R knee joint    Date/Time: 3/16/2021 2:29 PM  Performed by: Yeo, Albert, MD  Authorized by: Yeo, Albert, MD     Indications:  Pain and osteoarthritis  Needle Size:  25 G  Guidance: ultrasound    Approach:  Superolateral  Location:  Knee      Medications:  40 mg methylPREDNISolone 40 MG/ML  Aspirate amount (mL):  16  Aspirate:  Serous and yellow  Outcome:  Tolerated well, no immediate complications  Procedure discussed: discussed risks, benefits, and alternatives    Consent Given by:  Patient  Timeout: timeout called immediately prior to procedure    Prep: patient was prepped and draped in usual sterile fashion            Patient's conditions were thoroughly discussed during today's visit with greater than 50% of the visit spent counseling the patient with total time spent face-to-face with the patient being 20 minutes.    Albert Yeo MD, [facility]

## 2021-03-16 NOTE — PATIENT INSTRUCTIONS
1. Primary osteoarthritis of right knee      -Patient is following up for right knee pain due to arthritis  -Patient tolerated cortisone injection without complications  -Patient reports minimal to no pain relief with Synvisc injection  -Patient will follow up when pain and swelling returns.  Likely to consider MRI at the next visit depending on when patient returns  -Call direct clinic number [[phone]] at any time with questions or concerns.    Albert Yeo MD CAQSM  [facility]  [facility] [place]

## 2021-04-20 ENCOUNTER — DOCUMENTATION ONLY (OUTPATIENT)
Dept: OTHER | Facility: CLINIC | Age: 61
End: 2021-04-20

## 2021-10-11 ENCOUNTER — HEALTH MAINTENANCE LETTER (OUTPATIENT)
Age: 61
End: 2021-10-11

## 2021-12-05 ENCOUNTER — HEALTH MAINTENANCE LETTER (OUTPATIENT)
Age: 61
End: 2021-12-05

## 2022-01-11 DIAGNOSIS — M1A.09X0 IDIOPATHIC CHRONIC GOUT OF MULTIPLE SITES WITHOUT TOPHUS: ICD-10-CM

## 2022-01-13 RX ORDER — ALLOPURINOL 300 MG/1
TABLET ORAL
Qty: 90 TABLET | Refills: 3 | Status: SHIPPED | OUTPATIENT
Start: 2022-01-13 | End: 2022-01-19

## 2022-01-13 NOTE — TELEPHONE ENCOUNTER
Patient due for labs    Next 5 appointments (look out 90 days)      Jan 19, 2022  2:00 PM  (Arrive by 1:40 PM)  Adult Preventative Visit with Kal Briceño MD  [facility] ([facility] ) [address] [place] [address] [id number] [phone]           Has upcoming visit    Ananya Desir RN  [facility]

## 2022-01-19 ENCOUNTER — OFFICE VISIT (OUTPATIENT)
Dept: FAMILY MEDICINE | Facility: CLINIC | Age: 62
End: 2022-01-19
Payer: COMMERCIAL

## 2022-01-19 VITALS
HEIGHT: 74 IN | OXYGEN SATURATION: 99 % | WEIGHT: 253 LBS | HEART RATE: 79 BPM | DIASTOLIC BLOOD PRESSURE: 92 MMHG | TEMPERATURE: 98.4 F | RESPIRATION RATE: 14 BRPM | SYSTOLIC BLOOD PRESSURE: 154 MMHG | BODY MASS INDEX: 32.47 KG/M2

## 2022-01-19 DIAGNOSIS — Z23 HIGH PRIORITY FOR 2019-NCOV VACCINE: ICD-10-CM

## 2022-01-19 DIAGNOSIS — Z12.5 SCREENING FOR PROSTATE CANCER: ICD-10-CM

## 2022-01-19 DIAGNOSIS — Z86.0100 HISTORY OF COLONIC POLYPS: ICD-10-CM

## 2022-01-19 DIAGNOSIS — Z51.81 MEDICATION MONITORING ENCOUNTER: ICD-10-CM

## 2022-01-19 DIAGNOSIS — E78.5 HYPERLIPIDEMIA LDL GOAL <130: ICD-10-CM

## 2022-01-19 DIAGNOSIS — G25.81 RESTLESS LEGS SYNDROME (RLS): ICD-10-CM

## 2022-01-19 DIAGNOSIS — Z00.00 ROUTINE GENERAL MEDICAL EXAMINATION AT A HEALTH CARE FACILITY: Primary | ICD-10-CM

## 2022-01-19 DIAGNOSIS — M1A.09X0 IDIOPATHIC CHRONIC GOUT OF MULTIPLE SITES WITHOUT TOPHUS: ICD-10-CM

## 2022-01-19 DIAGNOSIS — Z23 NEED FOR INFLUENZA VACCINATION: ICD-10-CM

## 2022-01-19 DIAGNOSIS — I10 HYPERTENSION GOAL BP (BLOOD PRESSURE) < 140/90: ICD-10-CM

## 2022-01-19 DIAGNOSIS — Z12.11 SCREEN FOR COLON CANCER: ICD-10-CM

## 2022-01-19 LAB
ALBUMIN UR-MCNC: NEGATIVE MG/DL
APPEARANCE UR: CLEAR
BILIRUB UR QL STRIP: NEGATIVE
COLOR UR AUTO: YELLOW
ERYTHROCYTE [DISTWIDTH] IN BLOOD BY AUTOMATED COUNT: 12.2 % (ref 10–15)
GLUCOSE UR STRIP-MCNC: NEGATIVE MG/DL
HCT VFR BLD AUTO: 43 % (ref 40–53)
HGB BLD-MCNC: 14.4 G/DL (ref 13.3–17.7)
HGB UR QL STRIP: ABNORMAL
KETONES UR STRIP-MCNC: NEGATIVE MG/DL
LEUKOCYTE ESTERASE UR QL STRIP: NEGATIVE
MCH RBC QN AUTO: 31.2 PG (ref 26.5–33)
MCHC RBC AUTO-ENTMCNC: 33.5 G/DL (ref 31.5–36.5)
MCV RBC AUTO: 93 FL (ref 78–100)
NITRATE UR QL: NEGATIVE
PH UR STRIP: 5.5 [PH] (ref 5–7)
PLATELET # BLD AUTO: 217 10E3/UL (ref 150–450)
RBC # BLD AUTO: 4.62 10E6/UL (ref 4.4–5.9)
RBC #/AREA URNS AUTO: NORMAL /HPF
SP GR UR STRIP: >=1.03 (ref 1–1.03)
UROBILINOGEN UR STRIP-ACNC: 0.2 E.U./DL
WBC # BLD AUTO: 5.5 10E3/UL (ref 4–11)
WBC #/AREA URNS AUTO: NORMAL /HPF

## 2022-01-19 PROCEDURE — 36415 COLL VENOUS BLD VENIPUNCTURE: CPT | Performed by: FAMILY MEDICINE

## 2022-01-19 PROCEDURE — 82043 UR ALBUMIN QUANTITATIVE: CPT | Performed by: FAMILY MEDICINE

## 2022-01-19 PROCEDURE — 82728 ASSAY OF FERRITIN: CPT | Performed by: FAMILY MEDICINE

## 2022-01-19 PROCEDURE — 91305 COVID-19,PF,PFIZER (12+ YRS): CPT | Performed by: FAMILY MEDICINE

## 2022-01-19 PROCEDURE — 90471 IMMUNIZATION ADMIN: CPT | Performed by: FAMILY MEDICINE

## 2022-01-19 PROCEDURE — 90682 RIV4 VACC RECOMBINANT DNA IM: CPT | Performed by: FAMILY MEDICINE

## 2022-01-19 PROCEDURE — 82550 ASSAY OF CK (CPK): CPT | Performed by: FAMILY MEDICINE

## 2022-01-19 PROCEDURE — G0103 PSA SCREENING: HCPCS | Performed by: FAMILY MEDICINE

## 2022-01-19 PROCEDURE — 84550 ASSAY OF BLOOD/URIC ACID: CPT | Performed by: FAMILY MEDICINE

## 2022-01-19 PROCEDURE — 80061 LIPID PANEL: CPT | Performed by: FAMILY MEDICINE

## 2022-01-19 PROCEDURE — 85027 COMPLETE CBC AUTOMATED: CPT | Performed by: FAMILY MEDICINE

## 2022-01-19 PROCEDURE — 0054A COVID-19,PF,PFIZER (12+ YRS): CPT | Performed by: FAMILY MEDICINE

## 2022-01-19 PROCEDURE — 84443 ASSAY THYROID STIM HORMONE: CPT | Performed by: FAMILY MEDICINE

## 2022-01-19 PROCEDURE — 99396 PREV VISIT EST AGE 40-64: CPT | Mod: 25 | Performed by: FAMILY MEDICINE

## 2022-01-19 PROCEDURE — 80053 COMPREHEN METABOLIC PANEL: CPT | Performed by: FAMILY MEDICINE

## 2022-01-19 PROCEDURE — 81001 URINALYSIS AUTO W/SCOPE: CPT | Performed by: FAMILY MEDICINE

## 2022-01-19 RX ORDER — ALLOPURINOL 300 MG/1
1 TABLET ORAL DAILY
Qty: 90 TABLET | Refills: 3 | Status: SHIPPED | OUTPATIENT
Start: 2022-01-19 | End: 2023-02-15

## 2022-01-19 RX ORDER — AMLODIPINE BESYLATE 5 MG/1
5 TABLET ORAL DAILY
Qty: 90 TABLET | Refills: 3 | Status: SHIPPED | OUTPATIENT
Start: 2022-01-19 | End: 2023-02-06

## 2022-01-19 RX ORDER — ROPINIROLE 3 MG/1
3 TABLET, FILM COATED ORAL DAILY
Qty: 90 TABLET | Refills: 3 | Status: SHIPPED | OUTPATIENT
Start: 2022-01-19 | End: 2023-02-15

## 2022-01-19 RX ORDER — LISINOPRIL 20 MG/1
20 TABLET ORAL DAILY
Qty: 90 TABLET | Refills: 3 | Status: SHIPPED | OUTPATIENT
Start: 2022-01-19 | End: 2023-02-15

## 2022-01-19 ASSESSMENT — ENCOUNTER SYMPTOMS
SHORTNESS OF BREATH: 0
MYALGIAS: 0
ABDOMINAL PAIN: 0
HEARTBURN: 0
CHILLS: 0
NERVOUS/ANXIOUS: 0
JOINT SWELLING: 0
NAUSEA: 0
HEMATOCHEZIA: 0
SORE THROAT: 0
PARESTHESIAS: 0
EYE PAIN: 0
FEVER: 0
ARTHRALGIAS: 0
PALPITATIONS: 0
HEMATURIA: 0
WEAKNESS: 0
DIZZINESS: 0
HEADACHES: 0
DYSURIA: 0
FREQUENCY: 0
CONSTIPATION: 0
DIARRHEA: 0
COUGH: 0

## 2022-01-19 ASSESSMENT — PAIN SCALES - GENERAL: PAINLEVEL: NO PAIN (0)

## 2022-01-19 ASSESSMENT — MIFFLIN-ST. JEOR: SCORE: 2017.35

## 2022-01-19 NOTE — LETTER
January 28, 2022      Michael Conner  [address] [facility] [place] [address]        Dear ,    We are writing to inform you of your test results.    Your recent results have been reviewed.     They showed:     -FIT test (screening test for colon cancer) was normal.     We advise:     FIT annually   Colonoscopy due in January, 2024     Recheck blood pressure soon.     Let us know if you have any questions or concerns.       Resulted Orders   Comprehensive metabolic panel   Result Value Ref Range    Sodium 140 133 - 144 mmol/L    Potassium 4.3 3.4 - 5.3 mmol/L    Chloride 109 94 - 109 mmol/L    Carbon Dioxide (CO2) 22 20 - 32 mmol/L    Anion Gap 9 3 - 14 mmol/L    Urea Nitrogen 16 7 - 30 mg/dL    Creatinine 1.07 0.66 - 1.25 mg/dL    Calcium 9.0 8.5 - 10.1 mg/dL    Glucose 91 70 - 99 mg/dL    Alkaline Phosphatase 66 40 - 150 U/L    AST 26 0 - 45 U/L    ALT 48 0 - 70 U/L    Protein Total 7.2 6.8 - 8.8 g/dL    Albumin 3.8 3.4 - 5.0 g/dL    Bilirubin Total 0.7 0.2 - 1.3 mg/dL    GFR Estimate 78 >60 mL/min/1.73m2      Comment:      Effective December 21, 2021 eGFRcr in adults is calculated using the 2021 CKD-EPI creatinine equation which includes age and gender (Yumiko reddy al., NE, DOI: 10.1056/QHGWpv8599373)   Lipid panel reflex to direct LDL Fasting   Result Value Ref Range    Cholesterol 207 (H) <200 mg/dL    Triglycerides 108 <150 mg/dL    Direct Measure HDL 58 >=40 mg/dL    LDL Cholesterol Calculated 127 (H) <=100 mg/dL    Non HDL Cholesterol 149 (H) <130 mg/dL    Patient Fasting > 8hrs? Yes     Narrative    Cholesterol  Desirable:  <200 mg/dL    Triglycerides  Normal:  Less than 150 mg/dL  Borderline High:  150-199 mg/dL  High:  200-499 mg/dL  Very High:  Greater than or equal to 500 mg/dL    Direct Measure HDL  Female:  Greater than or equal to 50 mg/dL   Male:  Greater than or equal to 40 mg/dL    LDL Cholesterol  Desirable:  <100mg/dL  Above Desirable:  100-129 mg/dL   Borderline High:  130-159 mg/dL    High:  160-189 mg/dL   Very High:  >= 190 mg/dL    Non HDL Cholesterol  Desirable:  130 mg/dL  Above Desirable:  130-159 mg/dL  Borderline High:  160-189 mg/dL  High:  190-219 mg/dL  Very High:  Greater than or equal to 220 mg/dL   CBC with platelets   Result Value Ref Range    WBC Count 5.5 4.0 - 11.0 10e3/uL    RBC Count 4.62 4.40 - 5.90 10e6/uL    Hemoglobin 14.4 13.3 - 17.7 g/dL    Hematocrit 43.0 40.0 - 53.0 %    MCV 93 78 - 100 fL    MCH 31.2 26.5 - 33.0 pg    MCHC 33.5 31.5 - 36.5 g/dL    RDW 12.2 10.0 - 15.0 %    Platelet Count 217 150 - 450 10e3/uL   CK total   Result Value Ref Range     (H) 30 - 300 U/L   TSH with free T4 reflex   Result Value Ref Range    TSH 0.91 0.40 - 4.00 mU/L   Prostate Specific Antigen Screen   Result Value Ref Range    Prostate Specific Antigen Screen 0.50 0.00 - 4.00 ug/L   Fecal colorectal cancer screen FIT   Result Value Ref Range    Occult Blood Screen FIT Negative Negative   Uric acid   Result Value Ref Range    Uric Acid 8.1 (H) 3.5 - 7.2 mg/dL   Ferritin   Result Value Ref Range    Ferritin 231 26 - 388 ng/mL   UA reflex to Microscopic and Culture   Result Value Ref Range    Color Urine Yellow Colorless, Straw, Light Yellow, Yellow    Appearance Urine Clear Clear    Glucose Urine Negative Negative mg/dL    Bilirubin Urine Negative Negative    Ketones Urine Negative Negative mg/dL    Specific Gravity Urine >=1.030 1.003 - 1.035    Blood Urine Trace (A) Negative    pH Urine 5.5 5.0 - 7.0    Protein Albumin Urine Negative Negative mg/dL    Urobilinogen Urine 0.2 0.2, 1.0 E.U./dL    Nitrite Urine Negative Negative    Leukocyte Esterase Urine Negative Negative   Albumin Random Urine Quantitative with Creat Ratio   Result Value Ref Range    Creatinine Urine mg/dL 190 mg/dL    Albumin Urine mg/L 11 mg/L    Albumin Urine mg/g Cr 5.79 0.00 - 17.00 mg/g Cr   Urine Microscopic   Result Value Ref Range    RBC Urine 0-2 0-2 /HPF /HPF    WBC Urine None Seen 0-5 /HPF /HPF     Narrative    Urine Culture not indicated       If you have any questions or concerns, please call the clinic at the number listed above.       Sincerely,      Kal Briceño MD

## 2022-01-19 NOTE — LETTER
[facility]  [address] [place] [address]  [phone]                    January 24, 2022    Skip Conner  [address] [facility] [address]      Dear Skip,    Here is a summary of your recent test results:    Labs are overall quite good, except     Elevated urine acid     We advise:     Continue current cares.   Balanced low cholesterol diet.   Regular exercise.   Weight loss.   Low purine diet     For additional lab test information, [email or web address].org is an excellent reference.     Your test results are enclosed.      Please contact me if you have any questions.      Thank you very much for trusting [facility].     Healthy regards,          Kal Briceño M.D.        Results for orders placed or performed in visit on 01/19/22   Comprehensive metabolic panel     Status: Normal   Result Value Ref Range    Sodium 140 133 - 144 mmol/L    Potassium 4.3 3.4 - 5.3 mmol/L    Chloride 109 94 - 109 mmol/L    Carbon Dioxide (CO2) 22 20 - 32 mmol/L    Anion Gap 9 3 - 14 mmol/L    Urea Nitrogen 16 7 - 30 mg/dL    Creatinine 1.07 0.66 - 1.25 mg/dL    Calcium 9.0 8.5 - 10.1 mg/dL    Glucose 91 70 - 99 mg/dL    Alkaline Phosphatase 66 40 - 150 U/L    AST 26 0 - 45 U/L    ALT 48 0 - 70 U/L    Protein Total 7.2 6.8 - 8.8 g/dL    Albumin 3.8 3.4 - 5.0 g/dL    Bilirubin Total 0.7 0.2 - 1.3 mg/dL    GFR Estimate 78 >60 mL/min/1.73m2   Lipid panel reflex to direct LDL Fasting     Status: Abnormal   Result Value Ref Range    Cholesterol 207 (H) <200 mg/dL    Triglycerides 108 <150 mg/dL    Direct Measure HDL 58 >=40 mg/dL    LDL Cholesterol Calculated 127 (H) <=100 mg/dL    Non HDL Cholesterol 149 (H) <130 mg/dL    Patient Fasting > 8hrs? Yes     Narrative    Cholesterol  Desirable:  <200 mg/dL    Triglycerides  Normal:  Less than 150 mg/dL  Borderline High:  150-199 mg/dL  High:  200-499 mg/dL  Very High:  Greater than or equal to 500 mg/dL    Direct  Measure HDL  Female:  Greater than or equal to 50 mg/dL   Male:  Greater than or equal to 40 mg/dL    LDL Cholesterol  Desirable:  <100mg/dL  Above Desirable:  100-129 mg/dL   Borderline High:  130-159 mg/dL   High:  160-189 mg/dL   Very High:  >= 190 mg/dL    Non HDL Cholesterol  Desirable:  130 mg/dL  Above Desirable:  130-159 mg/dL  Borderline High:  160-189 mg/dL  High:  190-219 mg/dL  Very High:  Greater than or equal to 220 mg/dL   CBC with platelets     Status: Normal   Result Value Ref Range    WBC Count 5.5 4.0 - 11.0 10e3/uL    RBC Count 4.62 4.40 - 5.90 10e6/uL    Hemoglobin 14.4 13.3 - 17.7 g/dL    Hematocrit 43.0 40.0 - 53.0 %    MCV 93 78 - 100 fL    MCH 31.2 26.5 - 33.0 pg    MCHC 33.5 31.5 - 36.5 g/dL    RDW 12.2 10.0 - 15.0 %    Platelet Count 217 150 - 450 10e3/uL   CK total     Status: Abnormal   Result Value Ref Range     (H) 30 - 300 U/L   TSH with free T4 reflex     Status: Normal   Result Value Ref Range    TSH 0.91 0.40 - 4.00 mU/L   Prostate Specific Antigen Screen     Status: Normal   Result Value Ref Range    Prostate Specific Antigen Screen 0.50 0.00 - 4.00 ug/L   Uric acid     Status: Abnormal   Result Value Ref Range    Uric Acid 8.1 (H) 3.5 - 7.2 mg/dL   Ferritin     Status: Normal   Result Value Ref Range    Ferritin 231 26 - 388 ng/mL   UA reflex to Microscopic and Culture     Status: Abnormal    Specimen: Urine, Midstream   Result Value Ref Range    Color Urine Yellow Colorless, Straw, Light Yellow, Yellow    Appearance Urine Clear Clear    Glucose Urine Negative Negative mg/dL    Bilirubin Urine Negative Negative    Ketones Urine Negative Negative mg/dL    Specific Gravity Urine >=1.030 1.003 - 1.035    Blood Urine Trace (A) Negative    pH Urine 5.5 5.0 - 7.0    Protein Albumin Urine Negative Negative mg/dL    Urobilinogen Urine 0.2 0.2, 1.0 E.U./dL    Nitrite Urine Negative Negative    Leukocyte Esterase Urine Negative Negative   Albumin Random Urine Quantitative with Creat  Ratio     Status: None   Result Value Ref Range    Creatinine Urine mg/dL 190 mg/dL    Albumin Urine mg/L 11 mg/L    Albumin Urine mg/g Cr 5.79 0.00 - 17.00 mg/g Cr   Urine Microscopic     Status: Normal   Result Value Ref Range    RBC Urine 0-2 0-2 /HPF /HPF    WBC Urine None Seen 0-5 /HPF /HPF    Narrative    Urine Culture not indicated

## 2022-01-20 LAB
ALBUMIN SERPL-MCNC: 3.8 G/DL (ref 3.4–5)
ALP SERPL-CCNC: 66 U/L (ref 40–150)
ALT SERPL W P-5'-P-CCNC: 48 U/L (ref 0–70)
ANION GAP SERPL CALCULATED.3IONS-SCNC: 9 MMOL/L (ref 3–14)
AST SERPL W P-5'-P-CCNC: 26 U/L (ref 0–45)
BILIRUB SERPL-MCNC: 0.7 MG/DL (ref 0.2–1.3)
BUN SERPL-MCNC: 16 MG/DL (ref 7–30)
CALCIUM SERPL-MCNC: 9 MG/DL (ref 8.5–10.1)
CHLORIDE BLD-SCNC: 109 MMOL/L (ref 94–109)
CHOLEST SERPL-MCNC: 207 MG/DL
CK SERPL-CCNC: 301 U/L (ref 30–300)
CO2 SERPL-SCNC: 22 MMOL/L (ref 20–32)
CREAT SERPL-MCNC: 1.07 MG/DL (ref 0.66–1.25)
CREAT UR-MCNC: 190 MG/DL
FASTING STATUS PATIENT QL REPORTED: YES
FERRITIN SERPL-MCNC: 231 NG/ML (ref 26–388)
GFR SERPL CREATININE-BSD FRML MDRD: 78 ML/MIN/1.73M2
GLUCOSE BLD-MCNC: 91 MG/DL (ref 70–99)
HDLC SERPL-MCNC: 58 MG/DL
LDLC SERPL CALC-MCNC: 127 MG/DL
MICROALBUMIN UR-MCNC: 11 MG/L
MICROALBUMIN/CREAT UR: 5.79 MG/G CR (ref 0–17)
NONHDLC SERPL-MCNC: 149 MG/DL
POTASSIUM BLD-SCNC: 4.3 MMOL/L (ref 3.4–5.3)
PROT SERPL-MCNC: 7.2 G/DL (ref 6.8–8.8)
PSA SERPL-MCNC: 0.5 UG/L (ref 0–4)
SODIUM SERPL-SCNC: 140 MMOL/L (ref 133–144)
TRIGL SERPL-MCNC: 108 MG/DL
TSH SERPL DL<=0.005 MIU/L-ACNC: 0.91 MU/L (ref 0.4–4)
URATE SERPL-MCNC: 8.1 MG/DL (ref 3.5–7.2)

## 2022-01-23 PROCEDURE — 82274 ASSAY TEST FOR BLOOD FECAL: CPT | Performed by: FAMILY MEDICINE

## 2022-01-25 LAB — HEMOCCULT STL QL IA: NEGATIVE

## 2022-02-24 ENCOUNTER — ALLIED HEALTH/NURSE VISIT (OUTPATIENT)
Dept: NURSING | Facility: CLINIC | Age: 62
End: 2022-02-24
Payer: COMMERCIAL

## 2022-02-24 VITALS
HEART RATE: 88 BPM | BODY MASS INDEX: 32.73 KG/M2 | WEIGHT: 254.9 LBS | SYSTOLIC BLOOD PRESSURE: 138 MMHG | DIASTOLIC BLOOD PRESSURE: 88 MMHG | OXYGEN SATURATION: 98 %

## 2022-02-24 DIAGNOSIS — I10 HYPERTENSION GOAL BP (BLOOD PRESSURE) < 140/90: Primary | ICD-10-CM

## 2022-02-24 PROCEDURE — 99207 PR NO CHARGE NURSE ONLY: CPT

## 2022-02-24 NOTE — PROGRESS NOTES
"Skip Conner is being followed for Blood Pressure management.      BP Readings from Last 3 Encounters:   02/24/22 138/88   01/19/22 (!) 154/92   01/08/21 (!) 160/102       Wt Readings from Last 2 Encounters:   02/24/22 115.6 kg (254 lb 14.4 oz)   01/19/22 114.8 kg (253 lb)       Is pulse 55 or greater? - Yes    Pulse Readings from Last 3 Encounters:   02/24/22 88   01/19/22 79   11/04/20 86       Current blood pressure medication(s):  Current Outpatient Medications   Medication Sig Dispense Refill     allopurinol (ZYLOPRIM) 300 MG tablet Take 1 tablet (300 mg) by mouth daily 90 tablet 3     amLODIPine (NORVASC) 5 MG tablet Take 1 tablet (5 mg) by mouth daily 90 tablet 3     diclofenac (VOLTAREN) 1 % topical gel Apply 4 g topically 4 times daily 100 g 3     lisinopril (ZESTRIL) 20 MG tablet Take 1 tablet (20 mg) by mouth daily 90 tablet 3     rOPINIRole (REQUIP) 3 MG tablet Take 1 tablet (3 mg) by mouth daily 90 tablet 3          1. Follow up instructions include:     Per PCP.      SUBJECTIVE:                                                    The patient is taking medication as prescribed and is tolerating well.   Patient is monitoring Blood Pressure at home.     Took it once last week in the morning, \"it was high - 130s/80s\" Took it today at 12:30, it was 129/81.    LOV: 1/19/22  1) med refills, add amlodipine, close follow up BP     2) labs pending     3) immunization reviewed, flu/COVID given     Return in about 1 month (around 2/19/2022).    Out of the following complicating factors: Cough, Headache, Lightheadedness, Shortness of breath, Fatigue, Nausea, Sexual Dysfunction, New onset of swelling or edema, Weakness and New onset of Chest Pain, the patient reports:  None    OBJECTIVE:                                                      Today's BP completed using cuff size: large on right side arm.      Potassium   Date Value Ref Range Status   01/19/2022 4.3 3.4 - 5.3 mmol/L Final   11/19/2020 4.3 3.4 - 5.3 " mmol/L Final     Creatinine   Date Value Ref Range Status   01/19/2022 1.07 0.66 - 1.25 mg/dL Final   11/19/2020 1.06 0.66 - 1.25 mg/dL Final     Urea Nitrogen   Date Value Ref Range Status   01/19/2022 16 7 - 30 mg/dL Final   11/19/2020 20 7 - 30 mg/dL Final     GFR Estimate   Date Value Ref Range Status   01/19/2022 78 >60 mL/min/1.73m2 Final     Comment:     Effective December 21, 2021 eGFRcr in adults is calculated using the 2021 CKD-EPI creatinine equation which includes age and gender (Yumiko et al., NEJM, DOI: 10.1056/CCGIsa9938871)   11/19/2020 75 >60 mL/min/[1.73_m2] Final     Comment:     Non  GFR Calc  Starting 12/18/2018, serum creatinine based estimated GFR (eGFR) will be   calculated using the Chronic Kidney Disease Epidemiology Collaboration   (CKD-EPI) equation.           Education:  general discussion/verbal explanation  Ways to help improve BP/HTN:   Medications  Lose weight  Diet low in fat and rich in fruits, vegetables and low fat dairy products  Reduce salt in diet  Do something active for at least 30 minutes a day on most days of the week  Cut down on alcohol (if you drink more than 2 drinks per day)  Decrease stress (exercise, read, yoga, meditation, time for self, etc.)   Patient was given an opportunity to ask questions.    Patient verbalized understanding of this plan and is agreeable.    MRACIAL SOSA RN

## 2022-03-30 ENCOUNTER — OFFICE VISIT (OUTPATIENT)
Dept: FAMILY MEDICINE | Facility: CLINIC | Age: 62
End: 2022-03-30
Payer: COMMERCIAL

## 2022-03-30 VITALS
WEIGHT: 258 LBS | SYSTOLIC BLOOD PRESSURE: 132 MMHG | DIASTOLIC BLOOD PRESSURE: 70 MMHG | HEART RATE: 94 BPM | TEMPERATURE: 97.4 F | RESPIRATION RATE: 16 BRPM | BODY MASS INDEX: 33.11 KG/M2 | OXYGEN SATURATION: 94 % | HEIGHT: 74 IN

## 2022-03-30 DIAGNOSIS — M1A.09X0 IDIOPATHIC CHRONIC GOUT OF MULTIPLE SITES WITHOUT TOPHUS: ICD-10-CM

## 2022-03-30 DIAGNOSIS — Z86.0100 HISTORY OF COLONIC POLYPS: ICD-10-CM

## 2022-03-30 DIAGNOSIS — I10 HYPERTENSION GOAL BP (BLOOD PRESSURE) < 140/90: Primary | ICD-10-CM

## 2022-03-30 DIAGNOSIS — G25.81 RESTLESS LEGS SYNDROME (RLS): ICD-10-CM

## 2022-03-30 DIAGNOSIS — Z51.81 MEDICATION MONITORING ENCOUNTER: ICD-10-CM

## 2022-03-30 DIAGNOSIS — E78.5 HYPERLIPIDEMIA LDL GOAL <130: ICD-10-CM

## 2022-03-30 PROCEDURE — 99214 OFFICE O/P EST MOD 30 MIN: CPT | Performed by: FAMILY MEDICINE

## 2022-03-30 NOTE — PROGRESS NOTES
Assessment & Plan       ICD-10-CM    1. Hypertension goal BP (blood pressure) < 140/90  I10    2. Hyperlipidemia LDL goal <130  E78.5    3. Idiopathic chronic gout of 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  M1A.09X0    4. Restless legs syndrome (RLS)  G25.81    5. History of colonic polyps  Z86.010    6. Medication monitoring encounter  Z51.81        Discussed treatment/modality options, including risk and benefits, he desires:    1) continue current cares, BP at home average 130/80's    2) refills UTD    3) low salt diet, regular exercise, weight loss    4) CPX in 1/2023    All diagnosis above reviewed and noted above, otherwise stable.      See [facility] orders for further details.      Return in about 10 months (around 1/30/2023) for Complete Physical, Medication Recheck Visit, Follow Up Chronic - sooner if any BP issues.    No LOS data to display    Doing chart review, history and exam, documentation and further activities as noted.           Kal Briceño MD, FAAFP     [facility]  [address] [place] [id number]  [email or web address]   Office: [phone]  Fax: [phone]  Pager: [phone]       Pushpa Rodriguez is a 62 year old who presents for the following health issues     History of Present Illness       Hypertension: He presents for follow up of hypertension.  He does check blood pressure  regularly outside of the clinic. Outside blood pressures have been over 140/90. He follows a low salt diet.     He eats 2-3 servings of fruits and vegetables daily.He consumes 1 sweetened beverage(s) daily.He exercises with enough effort to increase his heart rate 10 to 19 minutes per day.  He exercises with enough effort to increase his heart rate 3 or less days per week.   He is taking medications regularly.     Htn    BP Readings from Last 3 Encounters:   03/30/22 132/70   02/24/22 138/88   01/19/22 (!) 154/92     Creatinine  "  Date Value Ref Range Status   01/19/2022 1.07 0.66 - 1.25 mg/dL Final   11/19/2020 1.06 0.66 - 1.25 mg/dL Final     GFR Estimate   Date Value Ref Range Status   01/19/2022 78 >60 mL/min/1.73m2 Final     Comment:     Effective December 21, 2021 eGFRcr in adults is calculated using the 2021 CKD-EPI creatinine equation which includes age and gender (Yumiko et al., NE, DOI: 10.1056/SYGVdl1649525)   11/19/2020 75 >60 mL/min/[1.73_m2] Final     Comment:     Non  GFR Calc  Starting 12/18/2018, serum creatinine based estimated GFR (eGFR) will be   calculated using the Chronic Kidney Disease Epidemiology Collaboration   (CKD-EPI) equation.       Lipids    Recent Labs   Lab Test 01/19/22  1427 11/19/20  0813   CHOL 207* 259*   HDL 58 66   * 180*   TRIG 108 64     Gout    No issues    RLS    Requip controls of taking 2 hrs before bed    Colonoscopy due 4/2024    Review of Systems   CONSTITUTIONAL: NEGATIVE for fever, chills, change in weight  INTEGUMENTARY/SKIN: NEGATIVE for worrisome rashes, moles or lesions  EYES: NEGATIVE for vision changes or irritation  ENT/MOUTH: NEGATIVE for ear, mouth and throat problems  RESP: NEGATIVE for significant cough or SOB  CV: NEGATIVE for chest pain, palpitations or peripheral edema  GI: NEGATIVE for nausea, abdominal pain, heartburn, or change in bowel habits  : NEGATIVE for frequency, dysuria, or hematuria  MUSCULOSKELETAL: NEGATIVE for significant arthralgias or myalgia  NEURO: NEGATIVE for weakness, dizziness or paresthesias  ENDOCRINE: NEGATIVE for temperature intolerance, skin/hair changes  HEME: NEGATIVE for bleeding problems  PSYCHIATRIC: NEGATIVE for changes in mood or affect      Objective    /70   Pulse 94   Temp 97.4  F (36.3  C) (Tympanic)   Resp 16   Ht 1.88 m (6' 2\")   Wt 117 kg (258 lb)   SpO2 94%   BMI 33.13 kg/m    Body mass index is 33.13 kg/m .  Physical Exam   GENERAL: healthy, alert and no distress  EYES: Eyes grossly normal to " inspection, PERRL and conjunctivae and sclerae normal  HENT: ear canals and TM's normal, nose and mouth without ulcers or lesions  NECK: no adenopathy, no asymmetry, masses, or scars and thyroid normal to palpation  RESP: lungs clear to auscultation - no rales, rhonchi or wheezes  CV: regular rate and rhythm, normal S1 S2, no S3 or S4, no murmur, click or rub, no peripheral edema and peripheral pulses strong  ABDOMEN: soft, nontender, no hepatosplenomegaly, no masses and bowel sounds normal  MS: no gross musculoskeletal defects noted, no edema  SKIN: no suspicious lesions or rashes  NEURO: Normal strength and tone, mentation intact and speech normal  PSYCH: mentation appears normal, affect normal/bright    Reviewed recent labs

## 2022-07-08 ENCOUNTER — E-VISIT (OUTPATIENT)
Dept: FAMILY MEDICINE | Facility: CLINIC | Age: 62
End: 2022-07-08
Payer: COMMERCIAL

## 2022-07-08 DIAGNOSIS — M25.561 CHRONIC PAIN OF RIGHT KNEE: Primary | ICD-10-CM

## 2022-07-08 DIAGNOSIS — G89.29 CHRONIC PAIN OF RIGHT KNEE: Primary | ICD-10-CM

## 2022-07-08 PROCEDURE — 99207 PR NO CHARGE LOS: CPT | Performed by: FAMILY MEDICINE

## 2022-07-18 ASSESSMENT — KOOS JR
RISING FROM SITTING: SEVERE
STANDING UPRIGHT: MODERATE
KOOS JR SCORING: 54.84
GOING UP OR DOWN STAIRS: MODERATE
TWISING OR PIVOTING ON KNEE: MODERATE
HOW SEVERE IS YOUR KNEE STIFFNESS AFTER FIRST WAKING IN MORNING: MODERATE
STRAIGHTENING KNEE FULLY: MILD
BENDING TO THE FLOOR TO PICK UP OBJECT: MILD

## 2022-07-19 ENCOUNTER — OFFICE VISIT (OUTPATIENT)
Dept: ORTHOPEDICS | Facility: CLINIC | Age: 62
End: 2022-07-19
Payer: COMMERCIAL

## 2022-07-19 VITALS — DIASTOLIC BLOOD PRESSURE: 70 MMHG | SYSTOLIC BLOOD PRESSURE: 120 MMHG

## 2022-07-19 DIAGNOSIS — M1A.0690 CHRONIC GOUT OF KNEE, UNSPECIFIED CAUSE, UNSPECIFIED LATERALITY: ICD-10-CM

## 2022-07-19 DIAGNOSIS — M25.561 CHRONIC PAIN OF RIGHT KNEE: ICD-10-CM

## 2022-07-19 DIAGNOSIS — G89.29 CHRONIC PAIN OF RIGHT KNEE: ICD-10-CM

## 2022-07-19 DIAGNOSIS — M17.11 PRIMARY OSTEOARTHRITIS OF RIGHT KNEE: Primary | ICD-10-CM

## 2022-07-19 DIAGNOSIS — M17.12 PRIMARY OSTEOARTHRITIS OF LEFT KNEE: ICD-10-CM

## 2022-07-19 PROCEDURE — 99202 OFFICE O/P NEW SF 15 MIN: CPT | Mod: 25 | Performed by: ORTHOPAEDIC SURGERY

## 2022-07-19 PROCEDURE — 20610 DRAIN/INJ JOINT/BURSA W/O US: CPT | Mod: 50 | Performed by: ORTHOPAEDIC SURGERY

## 2022-07-19 RX ADMIN — TRIAMCINOLONE ACETONIDE 40 MG: 40 INJECTION, SUSPENSION INTRA-ARTICULAR; INTRAMUSCULAR at 14:40

## 2022-07-19 NOTE — LETTER
7/19/2022         RE: Skip Conner  [id number] [facility] [id number]        Dear Colleague,    Thank you for referring your patient, Skip Conner, to the [facility] [place]. Please see a copy of my visit note below.    S: Patient is a 62 year old, male seen today in consultation for right knee pain.  They report onset of symptoms chronic.   Pain is located to the anterior knee. Increased pain with descending stairs, and prolonged sitting.  Swelling in the knee. Pain is improved by activity avoidance.  They have tried the following therapies: Advil, previous cortisone injections 3/16/21,  Synvisc One injection   12/9/20  Current pain level: 5/10    Patient also endorses similar symptoms on the left knee. Pain behind the patella, and swelling present.     Patient is currently working .          Patient Active Problem List   Diagnosis     Hypertension goal BP (blood pressure) < 140/90     Hyperlipidemia LDL goal <130     Idiopathic chronic gout of 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     Restless legs syndrome (RLS)     History of colonic polyps            Past Medical History:   Diagnosis Date     History of colonic polyps 01/2019     Hyperlipidemia LDL goal <130      Hypertension goal BP (blood pressure) < 140/90      Idiopathic chronic gout of 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      Restless legs syndrome (RLS)             Past Surgical History:   Procedure Laterality Date     APPENDECTOMY  2008     COLONOSCOPY  01/2019    colon polyp, due 5 yrs            Social History     Tobacco Use     Smoking status: Never Smoker     Smokeless tobacco: Never Used   Substance Use Topics     Alcohol use: Yes     Alcohol/week: 7.0 standard drinks     Types: 7 Standard drinks or equivalent per week     Comment: 1 per day            Family History   Problem Relation Age of Onset     Arthritis Mother      Heart Disease Father      Hypertension Father              No Known Allergies          Current Outpatient Medications   Medication Sig Dispense Refill     allopurinol (ZYLOPRIM) 300 MG tablet Take 1 tablet (300 mg) by mouth daily 90 tablet 3     amLODIPine (NORVASC) 5 MG tablet Take 1 tablet (5 mg) by mouth daily 90 tablet 3     lisinopril (ZESTRIL) 20 MG tablet Take 1 tablet (20 mg) by mouth daily 90 tablet 3     rOPINIRole (REQUIP) 3 MG tablet Take 1 tablet (3 mg) by mouth daily 90 tablet 3          Review Of Systems  Skin: negative  Eyes: negative  Ears/Nose/Throat: negative  Respiratory: No shortness of breath, dyspnea on exertion, cough, or hemoptysis    O: Physical Exam:  Bilateral knee pain.  Parapatellar tenderness to palpation.  CMS intact to both lower extremities. Adequate knee flexion and extension.  Pain to palpation medial and lateral joint line each knee.      Lab:   Uric acid 8.1, chol 207, , need to update inflammatory markers and arthritic profile    Images:       FINDINGS: No fracture. No degenerative changes. No effusion in either  knee. Advanced degenerative changes in the lateral patellofemoral  compartments of both knees    A: Inflammatory arthropathy/gout, patellofemoral arthrosis each knee    P:  Inject each knee joint after verbal and written consent/ethyl chloride and chloroprep.  Follow outcomes.  Update inflammatory markers and arthritic profile.  Treat elevated uric acid with diet change and allopurinol    See back 6-12 weeks.  Notify if exacerbation symptoms.     Large Joint Injection/Arthocentesis: bilateral knee    Date/Time: 7/19/2022 2:40 PM  Performed by: Roe Keating MD  Authorized by: Roe Keating MD     Indications:  Pain  Needle Size:  22 G  Guidance: landmark guided    Approach:  Anterolateral  Location:  Knee  Laterality:  Bilateral      Medications (Right):  40 mg triamcinolone 40 MG/ML   Medications (Right) comment:  3 ml Lidocaine 1%  NDC: 4879-0980-49  LOT: [id number]  Exp: 9/1/23    3 ml Bupivicaine .25%  NDC: 79103-501-22  LOT: [id number]  Exp:  51/23    Medications (Left):  40 mg triamcinolone 40 MG/ML  Outcome:  Tolerated well, no immediate complications  Procedure discussed: discussed risks, benefits, and alternatives    Consent Given by:  Patient  Timeout: timeout called immediately prior to procedure                 In addition to the above assessment and plan each active problem on Skip's problem list was evaluated today. This included the questioning of Skip for any medication problems. We will continue the current treatment plan for these active problems except as noted.        Again, thank you for allowing me to participate in the care of your patient.        Sincerely,        JEREMY HENRY MD

## 2022-07-19 NOTE — PROGRESS NOTES
S: Patient is a 62 year old, male seen today in consultation for right knee pain.  They report onset of symptoms chronic.   Pain is located to the anterior knee. Increased pain with descending stairs, and prolonged sitting.  Swelling in the knee. Pain is improved by activity avoidance.  They have tried the following therapies: Advil, previous cortisone injections 3/16/21,  Synvisc One injection   12/9/20  Current pain level: 5/10    Patient also endorses similar symptoms on the left knee. Pain behind the patella, and swelling present.     Patient is currently working .          Patient Active Problem List   Diagnosis     Hypertension goal BP (blood pressure) < 140/90     Hyperlipidemia LDL goal <130     Idiopathic chronic gout of 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     Restless legs syndrome (RLS)     History of colonic polyps            Past Medical History:   Diagnosis Date     History of colonic polyps 01/2019     Hyperlipidemia LDL goal <130      Hypertension goal BP (blood pressure) < 140/90      Idiopathic chronic gout of 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      Restless legs syndrome (RLS)             Past Surgical History:   Procedure Laterality Date     APPENDECTOMY  2008     COLONOSCOPY  01/2019    colon polyp, due 5 yrs            Social History     Tobacco Use     Smoking status: Never Smoker     Smokeless tobacco: Never Used   Substance Use Topics     Alcohol use: Yes     Alcohol/week: 7.0 standard drinks     Types: 7 Standard drinks or equivalent per week     Comment: 1 per day            Family History   Problem Relation Age of Onset     Arthritis Mother      Heart Disease Father      Hypertension Father              No Known Allergies         Current Outpatient Medications   Medication Sig Dispense Refill     allopurinol (ZYLOPRIM) 300 MG tablet Take 1 tablet (300 mg) by mouth daily 90 tablet 3     amLODIPine (NORVASC) 5 MG tablet Take 1 tablet (5 mg) by mouth daily 90 tablet 3     lisinopril (ZESTRIL)  20 MG tablet Take 1 tablet (20 mg) by mouth daily 90 tablet 3     rOPINIRole (REQUIP) 3 MG tablet Take 1 tablet (3 mg) by mouth daily 90 tablet 3          Review Of Systems  Skin: negative  Eyes: negative  Ears/Nose/Throat: negative  Respiratory: No shortness of breath, dyspnea on exertion, cough, or hemoptysis    O: Physical Exam:  Bilateral knee pain.  Parapatellar tenderness to palpation.  CMS intact to both lower extremities. Adequate knee flexion and extension.  Pain to palpation medial and lateral joint line each knee.      Lab:   Uric acid 8.1, chol 207, , need to update inflammatory markers and arthritic profile    Images:       FINDINGS: No fracture. No degenerative changes. No effusion in either  knee. Advanced degenerative changes in the lateral patellofemoral  compartments of both knees    A: Inflammatory arthropathy/gout, patellofemoral arthrosis each knee    P:  Inject each knee joint after verbal and written consent/ethyl chloride and chloroprep.  Follow outcomes.  Update inflammatory markers and arthritic profile.  Treat elevated uric acid with diet change and allopurinol    See back 6-12 weeks.  Notify if exacerbation symptoms.     Large Joint Injection/Arthocentesis: bilateral knee    Date/Time: 7/19/2022 2:40 PM  Performed by: Roe Keating MD  Authorized by: Roe Keating MD     Indications:  Pain  Needle Size:  22 G  Guidance: landmark guided    Approach:  Anterolateral  Location:  Knee  Laterality:  Bilateral      Medications (Right):  40 mg triamcinolone 40 MG/ML   Medications (Right) comment:  3 ml Lidocaine 1%  NDC: 4121-4142-03  LOT: [id number]  Exp: 9/1/23    3 ml Bupivicaine .25%  NDC: 52471-817-78  LOT: [id number]  Exp: 51/23    Medications (Left):  40 mg triamcinolone 40 MG/ML  Outcome:  Tolerated well, no immediate complications  Procedure discussed: discussed risks, benefits, and alternatives    Consent Given by:  Patient  Timeout: timeout called immediately prior to procedure                  In addition to the above assessment and plan each active problem on Skip's problem list was evaluated today. This included the questioning of Skip for any medication problems. We will continue the current treatment plan for these active problems except as noted.

## 2022-07-19 NOTE — PATIENT INSTRUCTIONS
Thank you for choosing [facility]    Dr. Keating Locations:    [facility] - [address], [address]  [place] [address] [address] [place] [address]   Appointments: [phone] Appointments: [phone]   Fax: [phone] Fax: [phone]       Follow up: ~6 weeks.   Please obtain updated Uric Acid lab, 2 weeks before visit. Lab order placed today.   OK to obtain at your PCP clinic or [facility].   Please call [phone] to schedule your follow up appointment.     Prior Authorization submitted for SynviscONE injections.      Steroid injection of the bilateral knee was performed today in clinic    Increase Allopurinol dose, 400mg daily.   Prescription sent to pharmacy for 100mg tablet to add to current regimen.    - Would not soak in a hot tub, bath or swimming pool for 48 hours  - Ok to shower  - Ice today and only do your normal amounts of activity  - The lidocaine (what is giving you pain relief right now) will likely stop working in 1-2 hours.  You will then have pain again, similar to before you received the injection. The corticosteroid will not start working until approximately 1-2 weeks from now.  In a small percentage of people, cortisone can cause flushing/redness in the face. This usually lasts for 1-3 days and resolves. Cool compress and Ibuprofen/Tylenol can help if this happens.

## 2022-07-26 ENCOUNTER — MYC MEDICAL ADVICE (OUTPATIENT)
Dept: ORTHOPEDICS | Facility: CLINIC | Age: 62
End: 2022-07-26

## 2022-07-26 RX ORDER — ALLOPURINOL 100 MG/1
TABLET ORAL
Qty: 30 TABLET | Refills: 11 | Status: SHIPPED | OUTPATIENT
Start: 2022-07-26 | End: 2023-02-15

## 2022-07-26 NOTE — TELEPHONE ENCOUNTER
Unable to review refill for allopuinol 100 mg tablet.  Pharmacy selected.     Maricruz Darby, ATC

## 2022-07-27 RX ORDER — TRIAMCINOLONE ACETONIDE 40 MG/ML
40 INJECTION, SUSPENSION INTRA-ARTICULAR; INTRAMUSCULAR
Status: DISCONTINUED | OUTPATIENT
Start: 2022-07-19 | End: 2022-10-11

## 2022-08-26 ENCOUNTER — LAB (OUTPATIENT)
Dept: LAB | Facility: CLINIC | Age: 62
End: 2022-08-26
Payer: COMMERCIAL

## 2022-08-26 DIAGNOSIS — M17.12 PRIMARY OSTEOARTHRITIS OF LEFT KNEE: ICD-10-CM

## 2022-08-26 DIAGNOSIS — M17.11 PRIMARY OSTEOARTHRITIS OF RIGHT KNEE: ICD-10-CM

## 2022-08-26 PROCEDURE — 36415 COLL VENOUS BLD VENIPUNCTURE: CPT

## 2022-08-26 PROCEDURE — 84550 ASSAY OF BLOOD/URIC ACID: CPT

## 2022-08-27 LAB — URATE SERPL-MCNC: 4.1 MG/DL (ref 3.5–7.2)

## 2022-09-24 ENCOUNTER — HEALTH MAINTENANCE LETTER (OUTPATIENT)
Age: 62
End: 2022-09-24

## 2022-10-04 ASSESSMENT — KOOS JR
HOW SEVERE IS YOUR KNEE STIFFNESS AFTER FIRST WAKING IN MORNING: MODERATE
STANDING UPRIGHT: MILD
RISING FROM SITTING: SEVERE
GOING UP OR DOWN STAIRS: SEVERE
STRAIGHTENING KNEE FULLY: MODERATE
BENDING TO THE FLOOR TO PICK UP OBJECT: SEVERE
TWISING OR PIVOTING ON KNEE: MODERATE
KOOS JR SCORING: 47.49

## 2022-10-11 ENCOUNTER — OFFICE VISIT (OUTPATIENT)
Dept: ORTHOPEDICS | Facility: CLINIC | Age: 62
End: 2022-10-11
Payer: COMMERCIAL

## 2022-10-11 VITALS — SYSTOLIC BLOOD PRESSURE: 136 MMHG | DIASTOLIC BLOOD PRESSURE: 82 MMHG

## 2022-10-11 DIAGNOSIS — M70.61 TROCHANTERIC BURSITIS OF RIGHT HIP: Primary | ICD-10-CM

## 2022-10-11 DIAGNOSIS — M17.10 PATELLOFEMORAL ARTHRITIS: ICD-10-CM

## 2022-10-11 PROCEDURE — 20610 DRAIN/INJ JOINT/BURSA W/O US: CPT | Mod: 59 | Performed by: ORTHOPAEDIC SURGERY

## 2022-10-11 PROCEDURE — 99207 PR DROP WITH A PROCEDURE: CPT | Performed by: ORTHOPAEDIC SURGERY

## 2022-10-11 PROCEDURE — 20610 DRAIN/INJ JOINT/BURSA W/O US: CPT | Mod: 50 | Performed by: ORTHOPAEDIC SURGERY

## 2022-10-11 RX ORDER — BUPIVACAINE HYDROCHLORIDE 5 MG/ML
3 INJECTION, SOLUTION EPIDURAL; INTRACAUDAL
Status: DISCONTINUED | OUTPATIENT
Start: 2022-10-11 | End: 2024-04-01

## 2022-10-11 RX ORDER — LIDOCAINE HYDROCHLORIDE 10 MG/ML
3 INJECTION, SOLUTION INFILTRATION; PERINEURAL
Status: DISCONTINUED | OUTPATIENT
Start: 2022-10-11 | End: 2024-04-01

## 2022-10-11 RX ORDER — TRIAMCINOLONE ACETONIDE 40 MG/ML
40 INJECTION, SUSPENSION INTRA-ARTICULAR; INTRAMUSCULAR
Status: DISCONTINUED | OUTPATIENT
Start: 2022-10-11 | End: 2024-04-01

## 2022-10-11 RX ADMIN — LIDOCAINE HYDROCHLORIDE 3 ML: 10 INJECTION, SOLUTION INFILTRATION; PERINEURAL at 15:10

## 2022-10-11 RX ADMIN — BUPIVACAINE HYDROCHLORIDE 3 ML: 5 INJECTION, SOLUTION EPIDURAL; INTRACAUDAL at 15:10

## 2022-10-11 RX ADMIN — TRIAMCINOLONE ACETONIDE 40 MG: 40 INJECTION, SUSPENSION INTRA-ARTICULAR; INTRAMUSCULAR at 15:10

## 2022-10-11 NOTE — PROGRESS NOTES
S: Patient is a 62 year old, male seen today in follow up for right> left knee pain. He reports over the last 10 days he has noticed pain that shoots up the right thigh into his buttock and lower back region.     Pain  wakes at nighttime, painful with sidelying.  They have tried the following therapies: ibuprofen, prior cortisone injection with 3 weeks relief.      Current pain level: 8/10     Patient is currently working     Patient states R knee is worst but R hip bothering down leg and some left knee symptoms.              Patient Active Problem List   Diagnosis     Hypertension goal BP (blood pressure) < 140/90     Hyperlipidemia LDL goal <130     Idiopathic chronic gout of 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     Restless legs syndrome (RLS)     History of colonic polyps            Past Medical History:   Diagnosis Date     History of colonic polyps 01/2019     Hyperlipidemia LDL goal <130      Hypertension goal BP (blood pressure) < 140/90      Idiopathic chronic gout of 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      Restless legs syndrome (RLS)             Past Surgical History:   Procedure Laterality Date     APPENDECTOMY  2008     COLONOSCOPY  01/2019    colon polyp, due 5 yrs            Social History     Tobacco Use     Smoking status: Never     Smokeless tobacco: Never   Substance Use Topics     Alcohol use: Yes     Alcohol/week: 7.0 standard drinks     Types: 7 Standard drinks or equivalent per week     Comment: 1 per day            Family History   Problem Relation Age of Onset     Arthritis Mother      Heart Disease Father      Hypertension Father              No Known Allergies         Current Outpatient Medications   Medication Sig Dispense Refill     allopurinol (ZYLOPRIM) 100 MG tablet Take in addition to 300mg tablet, 400 mg every day. 30 tablet 11     allopurinol (ZYLOPRIM) 300 MG tablet Take 1 tablet (300 mg) by mouth daily 90 tablet 3     amLODIPine (NORVASC) 5 MG tablet Take 1 tablet (5 mg) by mouth daily 90  tablet 3     lisinopril (ZESTRIL) 20 MG tablet Take 1 tablet (20 mg) by mouth daily 90 tablet 3     rOPINIRole (REQUIP) 3 MG tablet Take 1 tablet (3 mg) by mouth daily 90 tablet 3          Review Of Systems  Skin: negative  Eyes: negative  Ears/Nose/Throat: negative  Respiratory: No shortness of breath, dyspnea on exertion, cough, or hemoptysis    O: Physical Exam:  Tender to palpation R hip bursa, supple hip flexion/extension/abd/IR/ER.  CMS intact to RLE.  Some patellofemoral crepitus to palpation both knees.  Peripatellar pain to palpation each knee. Some lateral joint line tenderness to palpation R knee.  Adequate knee flexion and extension each.    Lab:  Uric acid 4.1    Images:     FINDINGS: No fracture. No degenerative changes. No effusion in either  knee. Advanced degenerative changes in the lateral patellofemoral  compartments of both knees.         A:  Moderate  Patellofemoral arthrosis each knee, inflammatory arthropathy with previous elevation uric acid, now improved on allopurinol.    P:  Inject each knee with viscosupplementation/synvisc and R hip bursa with lidocaine/marcaine/triamcinolone after verbal and written consent/ ethyl chloride/chloroprep.  Discussed operative intervention but decided to exhaust conservative measures.  Follow outcomes: patient states he is better already including hip feeling better in and out of chair, knees better with same and with ambulation.  See back 3 months.  Notify if exacerbation symptoms.           In addition to the above assessment and plan each active problem on Skip's problem list was evaluated today. This included the questioning of Skip for any medication problems. We will continue the current treatment plan for these active problems except as noted.      Large Joint Injection/Arthocentesis: bilateral knee    Date/Time: 10/11/2022 3:10 PM  Performed by: Roe Keating MD  Authorized by: Roe Keating MD     Indications:  Pain and  osteoarthritis  Needle Size:  21 G  Guidance: landmark guided    Approach:  Superolateral  Location:  Knee  Laterality:  Bilateral      Medications (Right):  48 mg hylan 48 MG/6ML  Medications (Left):  48 mg hylan 48 MG/6ML  Outcome:  Tolerated well, no immediate complications  Procedure discussed: discussed risks, benefits, and alternatives    Consent Given by:  Patient  Timeout: timeout called immediately prior to procedure    Large Joint Injection/Arthocentesis: R greater trochanteric bursa    Date/Time: 10/11/2022 3:10 PM  Performed by: Roe Keating MD  Authorized by: Roe Keating MD     Indications:  Pain  Needle Size:  22 G  Guidance: landmark guided    Approach:  Posterolateral  Location:  Hip      Site:  R greater trochanteric bursa  Medications:  40 mg triamcinolone 40 MG/ML; 3 mL lidocaine 1 %; 3 mL bupivacaine (PF) 0.5 %  Outcome:  Tolerated well, no immediate complications  Procedure discussed: discussed risks, benefits, and alternatives    Consent Given by:  Patient  Timeout: timeout called immediately prior to procedure

## 2022-10-11 NOTE — LETTER
10/11/2022         RE: Skip Conner  [address] [facility] [address]        Dear Colleague,    Thank you for referring your patient, Skip Conner, to the [facility] [place]. Please see a copy of my visit note below.    S: Patient is a 62 year old, male seen today in follow up for right> left knee pain. He reports over the last 10 days he has noticed pain that shoots up the right thigh into his buttock and lower back region.     Pain  wakes at nighttime, painful with sidelying.  They have tried the following therapies: ibuprofen, prior cortisone injection with 3 weeks relief.      Current pain level: 8/10     Patient is currently working     Patient states R knee is worst but R hip bothering down leg and some left knee symptoms.              Patient Active Problem List   Diagnosis     Hypertension goal BP (blood pressure) < 140/90     Hyperlipidemia LDL goal <130     Idiopathic chronic gout of 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     Restless legs syndrome (RLS)     History of colonic polyps            Past Medical History:   Diagnosis Date     History of colonic polyps 01/2019     Hyperlipidemia LDL goal <130      Hypertension goal BP (blood pressure) < 140/90      Idiopathic chronic gout of 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      Restless legs syndrome (RLS)             Past Surgical History:   Procedure Laterality Date     APPENDECTOMY  2008     COLONOSCOPY  01/2019    colon polyp, due 5 yrs            Social History     Tobacco Use     Smoking status: Never     Smokeless tobacco: Never   Substance Use Topics     Alcohol use: Yes     Alcohol/week: 7.0 standard drinks     Types: 7 Standard drinks or equivalent per week     Comment: 1 per day            Family History   Problem Relation Age of Onset     Arthritis Mother      Heart Disease Father      Hypertension Father              No Known Allergies         Current Outpatient Medications   Medication Sig Dispense Refill      allopurinol (ZYLOPRIM) 100 MG tablet Take in addition to 300mg tablet, 400 mg every day. 30 tablet 11     allopurinol (ZYLOPRIM) 300 MG tablet Take 1 tablet (300 mg) by mouth daily 90 tablet 3     amLODIPine (NORVASC) 5 MG tablet Take 1 tablet (5 mg) by mouth daily 90 tablet 3     lisinopril (ZESTRIL) 20 MG tablet Take 1 tablet (20 mg) by mouth daily 90 tablet 3     rOPINIRole (REQUIP) 3 MG tablet Take 1 tablet (3 mg) by mouth daily 90 tablet 3          Review Of Systems  Skin: negative  Eyes: negative  Ears/Nose/Throat: negative  Respiratory: No shortness of breath, dyspnea on exertion, cough, or hemoptysis    O: Physical Exam:  Tender to palpation R hip bursa, supple hip flexion/extension/abd/IR/ER.  CMS intact to RLE.  Some patellofemoral crepitus to palpation both knees.  Peripatellar pain to palpation each knee. Some lateral joint line tenderness to palpation R knee.  Adequate knee flexion and extension each.    Lab:  Uric acid 4.1    Images:     FINDINGS: No fracture. No degenerative changes. No effusion in either  knee. Advanced degenerative changes in the lateral patellofemoral  compartments of both knees.         A:  Moderate  Patellofemoral arthrosis each knee, inflammatory arthropathy with previous elevation uric acid, now improved on allopurinol.    P:  Inject each knee with viscosupplementation/synvisc and R hip bursa with lidocaine/marcaine/triamcinolone after verbal and written consent/ ethyl chloride/chloroprep.  Discussed operative intervention but decided to exhaust conservative measures.  Follow outcomes: patient states he is better already including hip feeling better in and out of chair, knees better with same and with ambulation.  See back 3 months.  Notify if exacerbation symptoms.           In addition to the above assessment and plan each active problem on Skip's problem list was evaluated today. This included the questioning of Skip for any medication problems. We will continue the  current treatment plan for these active problems except as noted.      Large Joint Injection/Arthocentesis: bilateral knee    Date/Time: 10/11/2022 3:10 PM  Performed by: Jeremy Henry MD  Authorized by: Jeremy Henry MD     Indications:  Pain and osteoarthritis  Needle Size:  21 G  Guidance: landmark guided    Approach:  Superolateral  Location:  Knee  Laterality:  Bilateral      Medications (Right):  48 mg hylan 48 MG/6ML  Medications (Left):  48 mg hylan 48 MG/6ML  Outcome:  Tolerated well, no immediate complications  Procedure discussed: discussed risks, benefits, and alternatives    Consent Given by:  Patient  Timeout: timeout called immediately prior to procedure    Large Joint Injection/Arthocentesis: R greater trochanteric bursa    Date/Time: 10/11/2022 3:10 PM  Performed by: Jeremy Henry MD  Authorized by: Jeremy Henry MD     Indications:  Pain  Needle Size:  22 G  Guidance: landmark guided    Approach:  Posterolateral  Location:  Hip      Site:  R greater trochanteric bursa  Medications:  40 mg triamcinolone 40 MG/ML; 3 mL lidocaine 1 %; 3 mL bupivacaine (PF) 0.5 %  Outcome:  Tolerated well, no immediate complications  Procedure discussed: discussed risks, benefits, and alternatives    Consent Given by:  Patient  Timeout: timeout called immediately prior to procedure              Again, thank you for allowing me to participate in the care of your patient.        Sincerely,        JEREMY HENRY MD

## 2022-12-26 NOTE — PROGRESS NOTES
S:Patient is a 62 year old,male seen today in follow up for right hip and bilateral knee injections.    They were previously evaluated on 10/11/22,    Since this visit they report patient reports that injections for both knees are still doing very good with little stiffness. Patient does report that his right hip injection is now painful. Patient reports that the injection lasted 1 week.   Current pain level: 3/10, Worst pain level: 6/10.              Patient Active Problem List   Diagnosis     Hypertension goal BP (blood pressure) < 140/90     Hyperlipidemia LDL goal <130     Idiopathic chronic gout of 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     Restless legs syndrome (RLS)     History of colonic polyps            Past Medical History:   Diagnosis Date     History of colonic polyps 01/2019     Hyperlipidemia LDL goal <130      Hypertension goal BP (blood pressure) < 140/90      Idiopathic chronic gout of 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      Restless legs syndrome (RLS)             Past Surgical History:   Procedure Laterality Date     APPENDECTOMY  2008     COLONOSCOPY  01/2019    colon polyp, due 5 yrs            Social History     Tobacco Use     Smoking status: Never     Smokeless tobacco: Never   Substance Use Topics     Alcohol use: Yes     Alcohol/week: 7.0 standard drinks     Types: 7 Standard drinks or equivalent per week     Comment: 1 per day            Family History   Problem Relation Age of Onset     Arthritis Mother      Heart Disease Father      Hypertension Father              No Known Allergies         Current Outpatient Medications   Medication Sig Dispense Refill     allopurinol (ZYLOPRIM) 100 MG tablet Take in addition to 300mg tablet, 400 mg every day. 30 tablet 11     allopurinol (ZYLOPRIM) 300 MG tablet Take 1 tablet (300 mg) by mouth daily 90 tablet 3     amLODIPine (NORVASC) 5 MG tablet Take 1 tablet (5 mg) by mouth daily 90 tablet 3     lisinopril (ZESTRIL) 20 MG tablet Take 1 tablet (20 mg) by mouth  daily 90 tablet 3     rOPINIRole (REQUIP) 3 MG tablet Take 1 tablet (3 mg) by mouth daily 90 tablet 3          Review Of Systems  Skin: negative  Eyes: negative  Ears/Nose/Throat: negative  Respiratory: No shortness of breath, dyspnea on exertion, cough, or hemoptysis    O: physical Exam:  R hip pain to palpation over trochanteric bursa.  CMS intact.  No tension signs.  Adequate IR/ER/abduction both hips.      Images:  KNEE BILATERAL THREE VIEWS July 19, 2022 2:16 PM     HISTORY: Chronic pain of right knee. Primary osteoarthritis of right  knee. Primary osteoarthritis of left knee.     COMPARISON: None.     FINDINGS: No fracture. No degenerative changes. No effusion in either  knee. Advanced degenerative changes in the lateral patellofemoral  compartments of both knees.       A:  R hip bursitis, stable patellofemoral arthrosis each knee     P:  Repeat injection R hip bursitis after verbal and written consent.  Ethyl chloride, chloroprep.  Notify if exacerbation symptoms  Follow outcomes  See back 2-3 mos               In addition to the above assessment and plan each active problem on Skip's problem list was evaluated today. This included the questioning of Skip for any medication problems. We will continue the current treatment plan for these active problems except as noted.       Consent obtained as noted  Location and laterality as noted  Drug name and dose: lidocaine/marcaine/triamcinolone   complications  none noted

## 2022-12-27 ENCOUNTER — OFFICE VISIT (OUTPATIENT)
Dept: ORTHOPEDICS | Facility: CLINIC | Age: 62
End: 2022-12-27
Payer: COMMERCIAL

## 2022-12-27 VITALS — WEIGHT: 258 LBS | SYSTOLIC BLOOD PRESSURE: 138 MMHG | DIASTOLIC BLOOD PRESSURE: 84 MMHG | BODY MASS INDEX: 33.13 KG/M2

## 2022-12-27 DIAGNOSIS — M70.61 TROCHANTERIC BURSITIS OF RIGHT HIP: Primary | ICD-10-CM

## 2022-12-27 PROCEDURE — 99213 OFFICE O/P EST LOW 20 MIN: CPT | Mod: 25 | Performed by: ORTHOPAEDIC SURGERY

## 2022-12-27 PROCEDURE — 20610 DRAIN/INJ JOINT/BURSA W/O US: CPT | Mod: RT | Performed by: ORTHOPAEDIC SURGERY

## 2022-12-27 PROCEDURE — 99207 PR NO CHARGE INJECTABLE MED/DRUG: CPT | Performed by: ORTHOPAEDIC SURGERY

## 2022-12-27 ASSESSMENT — HOOS JR
BENDING TO THE FLOOR TO PICK UP OBJECT: MILD
HOOS JR TOTAL INTERVAL SCORE: 58.93
WALKING ON UNEVEN SURFACE: MILD
SITTING: MODERATE
GOING UP OR DOWN STAIRS: MODERATE
RISING FROM SITTING: MODERATE
LYING IN BED (TURNING OVER, MAINTAINING HIP POSITION): MODERATE

## 2022-12-27 ASSESSMENT — KOOS JR
HOW SEVERE IS YOUR KNEE STIFFNESS AFTER FIRST WAKING IN MORNING: MILD
KOOS JR SCORING: 91.98

## 2022-12-27 NOTE — PATIENT INSTRUCTIONS
Thank you for choosing [facility]    Dr. Keating Locations:    [facility] [address], [address]  [address] [address]   Appointments: [phone] Appointments: [phone]   Fax: [phone] Fax: [phone]         For any questions please contact my office, [phone].

## 2022-12-27 NOTE — LETTER
12/27/2022         RE: Skip Conner  [address] [facility] [address]        Dear Colleague,    Thank you for referring your patient, Skip Conner, to the [facility] [place]. Please see a copy of my visit note below.    S:Patient is a 62 year old,male seen today in follow up for right hip and bilateral knee injections.    They were previously evaluated on 10/11/22,    Since this visit they report patient reports that injections for both knees are still doing very good with little stiffness. Patient does report that his right hip injection is now painful. Patient reports that the injection lasted 1 week.   Current pain level: 3/10, Worst pain level: 6/10.              Patient Active Problem List   Diagnosis     Hypertension goal BP (blood pressure) < 140/90     Hyperlipidemia LDL goal <130     Idiopathic chronic gout of 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     Restless legs syndrome (RLS)     History of colonic polyps            Past Medical History:   Diagnosis Date     History of colonic polyps 01/2019     Hyperlipidemia LDL goal <130      Hypertension goal BP (blood pressure) < 140/90      Idiopathic chronic gout of 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      Restless legs syndrome (RLS)             Past Surgical History:   Procedure Laterality Date     APPENDECTOMY  2008     COLONOSCOPY  01/2019    colon polyp, due 5 yrs            Social History     Tobacco Use     Smoking status: Never     Smokeless tobacco: Never   Substance Use Topics     Alcohol use: Yes     Alcohol/week: 7.0 standard drinks     Types: 7 Standard drinks or equivalent per week     Comment: 1 per day            Family History   Problem Relation Age of Onset     Arthritis Mother      Heart Disease Father      Hypertension Father              No Known Allergies         Current Outpatient Medications   Medication Sig Dispense Refill     allopurinol (ZYLOPRIM) 100 MG tablet Take in addition to 300mg tablet, 400 mg  every day. 30 tablet 11     allopurinol (ZYLOPRIM) 300 MG tablet Take 1 tablet (300 mg) by mouth daily 90 tablet 3     amLODIPine (NORVASC) 5 MG tablet Take 1 tablet (5 mg) by mouth daily 90 tablet 3     lisinopril (ZESTRIL) 20 MG tablet Take 1 tablet (20 mg) by mouth daily 90 tablet 3     rOPINIRole (REQUIP) 3 MG tablet Take 1 tablet (3 mg) by mouth daily 90 tablet 3          Review Of Systems  Skin: negative  Eyes: negative  Ears/Nose/Throat: negative  Respiratory: No shortness of breath, dyspnea on exertion, cough, or hemoptysis    O: physical Exam:  R hip pain to palpation over trochanteric bursa.  CMS intact.  No tension signs.  Adequate IR/ER/abduction both hips.      Images:  KNEE BILATERAL THREE VIEWS July 19, 2022 2:16 PM     HISTORY: Chronic pain of right knee. Primary osteoarthritis of right  knee. Primary osteoarthritis of left knee.     COMPARISON: None.     FINDINGS: No fracture. No degenerative changes. No effusion in either  knee. Advanced degenerative changes in the lateral patellofemoral  compartments of both knees.       A:  R hip bursitis, stable patellofemoral arthrosis each knee     P:  Repeat injection R hip bursitis after verbal and written consent.  Ethyl chloride, chloroprep.  Notify if exacerbation symptoms  Follow outcomes  See back 2-3 mos               In addition to the above assessment and plan each active problem on Skip's problem list was evaluated today. This included the questioning of Skip for any medication problems. We will continue the current treatment plan for these active problems except as noted.      Again, thank you for allowing me to participate in the care of your patient.        Sincerely,        JEREMY HENRY MD

## 2023-01-31 ENCOUNTER — MYC MEDICAL ADVICE (OUTPATIENT)
Dept: FAMILY MEDICINE | Facility: CLINIC | Age: 63
End: 2023-01-31
Payer: COMMERCIAL

## 2023-01-31 DIAGNOSIS — G25.81 RESTLESS LEGS SYNDROME (RLS): ICD-10-CM

## 2023-01-31 DIAGNOSIS — M1A.0690 CHRONIC GOUT OF KNEE, UNSPECIFIED CAUSE, UNSPECIFIED LATERALITY: ICD-10-CM

## 2023-01-31 DIAGNOSIS — I10 HYPERTENSION GOAL BP (BLOOD PRESSURE) < 140/90: ICD-10-CM

## 2023-01-31 DIAGNOSIS — M1A.09X0 IDIOPATHIC CHRONIC GOUT OF MULTIPLE SITES WITHOUT TOPHUS: ICD-10-CM

## 2023-02-01 NOTE — TELEPHONE ENCOUNTER
My chart message sent     Terese Leung RN, BSN  [facility] [place]

## 2023-02-01 NOTE — TELEPHONE ENCOUNTER
My chart message sent     Terese Leung RN, BSN  [facility]

## 2023-02-06 DIAGNOSIS — I10 HYPERTENSION GOAL BP (BLOOD PRESSURE) < 140/90: ICD-10-CM

## 2023-02-06 RX ORDER — AMLODIPINE BESYLATE 5 MG/1
5 TABLET ORAL DAILY
Qty: 90 TABLET | Refills: 0 | Status: SHIPPED | OUTPATIENT
Start: 2023-02-06 | End: 2023-03-29

## 2023-02-06 NOTE — TELEPHONE ENCOUNTER
Patient was told that the refills needed attention, and this is the one he just took his last pill today.  Can we transfer the medication over to [facility].    Amlodipine 5mg daily to [facility] in [place].

## 2023-02-06 NOTE — TELEPHONE ENCOUNTER
Medication is being filled for 1 time refill only due to:  has an upcoming appt   Next 5 appointments (look out 90 days)    Mar 14, 2023  9:00 AM  (Arrive by 8:40 AM)  Adult Preventative Visit with Kal Briceño MD  [facility] ([facility] ) [address] [place] [id number] [phone]

## 2023-02-07 NOTE — TELEPHONE ENCOUNTER
New prescriptions sent 7/26, 1/19, 2/6. All with refills available for the next year.     MARCIAL SOSA RN on 2/7/2023 at 12:12 PM   [facility]

## 2023-02-15 RX ORDER — ALLOPURINOL 100 MG/1
TABLET ORAL
Qty: 90 TABLET | Refills: 0 | Status: SHIPPED | OUTPATIENT
Start: 2023-02-15 | End: 2023-03-29

## 2023-02-15 RX ORDER — ALLOPURINOL 300 MG/1
1 TABLET ORAL DAILY
Qty: 90 TABLET | Refills: 0 | Status: SHIPPED | OUTPATIENT
Start: 2023-02-15 | End: 2023-03-29

## 2023-02-15 RX ORDER — LISINOPRIL 20 MG/1
20 TABLET ORAL DAILY
Qty: 90 TABLET | Refills: 3 | Status: SHIPPED | OUTPATIENT
Start: 2023-02-15 | End: 2023-03-29

## 2023-02-15 RX ORDER — ROPINIROLE 3 MG/1
3 TABLET, FILM COATED ORAL DAILY
Qty: 90 TABLET | Refills: 0 | Status: SHIPPED | OUTPATIENT
Start: 2023-02-15 | End: 2023-03-29

## 2023-02-15 NOTE — TELEPHONE ENCOUNTER
Routing refill request to provider for review/approval because:  Patient needs to be seen because it has been more than 1 year since last office visit.  Future Appointments 2/15/2023 - 8/14/2023        Date Visit Type Length Department Provider     3/14/2023  9:00 AM PREVENTATIVE ADULT 30 min [facility] Kal Briceño MD    Location Instructions:     [facility] is located at [address] [place] [address], along [address]. Free parking is available; access the lot by turning north from [address] onto [address], then west onto [facility] [address] [facility] [address] [facility].

## 2023-03-28 ASSESSMENT — ENCOUNTER SYMPTOMS
HEMATOCHEZIA: 0
CHILLS: 0
HEARTBURN: 0
DYSURIA: 0
COUGH: 0
EYE PAIN: 0
ABDOMINAL PAIN: 0
SHORTNESS OF BREATH: 0
FEVER: 0
MYALGIAS: 0
PARESTHESIAS: 0
HEADACHES: 0
CONSTIPATION: 0
FREQUENCY: 0
DIZZINESS: 0
JOINT SWELLING: 0
HEMATURIA: 0
ARTHRALGIAS: 0
NAUSEA: 0
WEAKNESS: 0
SORE THROAT: 0
PALPITATIONS: 0
DIARRHEA: 0
NERVOUS/ANXIOUS: 0

## 2023-03-29 ENCOUNTER — OFFICE VISIT (OUTPATIENT)
Dept: FAMILY MEDICINE | Facility: CLINIC | Age: 63
End: 2023-03-29
Payer: COMMERCIAL

## 2023-03-29 VITALS
HEART RATE: 87 BPM | TEMPERATURE: 97.6 F | HEIGHT: 74 IN | SYSTOLIC BLOOD PRESSURE: 122 MMHG | RESPIRATION RATE: 16 BRPM | OXYGEN SATURATION: 98 % | WEIGHT: 256.1 LBS | DIASTOLIC BLOOD PRESSURE: 80 MMHG | BODY MASS INDEX: 32.87 KG/M2

## 2023-03-29 DIAGNOSIS — E78.5 HYPERLIPIDEMIA LDL GOAL <130: ICD-10-CM

## 2023-03-29 DIAGNOSIS — G25.81 RESTLESS LEGS SYNDROME (RLS): ICD-10-CM

## 2023-03-29 DIAGNOSIS — Z51.81 MEDICATION MONITORING ENCOUNTER: ICD-10-CM

## 2023-03-29 DIAGNOSIS — M1A.09X0 IDIOPATHIC CHRONIC GOUT OF MULTIPLE SITES WITHOUT TOPHUS: ICD-10-CM

## 2023-03-29 DIAGNOSIS — E66.811 CLASS 1 OBESITY WITH SERIOUS COMORBIDITY AND BODY MASS INDEX (BMI) OF 32.0 TO 32.9 IN ADULT, UNSPECIFIED OBESITY TYPE: ICD-10-CM

## 2023-03-29 DIAGNOSIS — Z12.11 SCREEN FOR COLON CANCER: ICD-10-CM

## 2023-03-29 DIAGNOSIS — M15.0 PRIMARY OSTEOARTHRITIS INVOLVING MULTIPLE JOINTS: ICD-10-CM

## 2023-03-29 DIAGNOSIS — I10 HYPERTENSION GOAL BP (BLOOD PRESSURE) < 140/90: ICD-10-CM

## 2023-03-29 DIAGNOSIS — Z86.0100 HISTORY OF COLONIC POLYPS: ICD-10-CM

## 2023-03-29 DIAGNOSIS — R41.3 MEMORY CHANGES: ICD-10-CM

## 2023-03-29 DIAGNOSIS — Z23 NEED FOR PNEUMOCOCCAL VACCINATION: ICD-10-CM

## 2023-03-29 DIAGNOSIS — M1A.0690 CHRONIC GOUT OF KNEE, UNSPECIFIED CAUSE, UNSPECIFIED LATERALITY: ICD-10-CM

## 2023-03-29 DIAGNOSIS — Z81.8 FAMILY HISTORY OF DEMENTIA: ICD-10-CM

## 2023-03-29 DIAGNOSIS — Z23 NEED FOR COVID-19 VACCINE: ICD-10-CM

## 2023-03-29 DIAGNOSIS — Z12.5 SCREENING FOR PROSTATE CANCER: ICD-10-CM

## 2023-03-29 DIAGNOSIS — Z00.00 ROUTINE GENERAL MEDICAL EXAMINATION AT A HEALTH CARE FACILITY: Primary | ICD-10-CM

## 2023-03-29 LAB
ALBUMIN SERPL BCG-MCNC: 4.2 G/DL (ref 3.5–5.2)
ALBUMIN UR-MCNC: NEGATIVE MG/DL
ALP SERPL-CCNC: 62 U/L (ref 40–129)
ALT SERPL W P-5'-P-CCNC: 34 U/L (ref 10–50)
ANION GAP SERPL CALCULATED.3IONS-SCNC: 11 MMOL/L (ref 7–15)
APPEARANCE UR: CLEAR
AST SERPL W P-5'-P-CCNC: 30 U/L (ref 10–50)
BILIRUB SERPL-MCNC: 0.7 MG/DL
BILIRUB UR QL STRIP: NEGATIVE
BUN SERPL-MCNC: 15.4 MG/DL (ref 8–23)
CALCIUM SERPL-MCNC: 9.5 MG/DL (ref 8.8–10.2)
CHLORIDE SERPL-SCNC: 104 MMOL/L (ref 98–107)
CHOLEST SERPL-MCNC: 229 MG/DL
CK SERPL-CCNC: 122 U/L (ref 39–308)
COLOR UR AUTO: YELLOW
CREAT SERPL-MCNC: 1.06 MG/DL (ref 0.67–1.17)
CREAT UR-MCNC: 128 MG/DL
DEPRECATED HCO3 PLAS-SCNC: 25 MMOL/L (ref 22–29)
ERYTHROCYTE [DISTWIDTH] IN BLOOD BY AUTOMATED COUNT: 12.7 % (ref 10–15)
FERRITIN SERPL-MCNC: 379 NG/ML (ref 31–409)
GFR SERPL CREATININE-BSD FRML MDRD: 78 ML/MIN/1.73M2
GLUCOSE SERPL-MCNC: 102 MG/DL (ref 70–99)
GLUCOSE UR STRIP-MCNC: NEGATIVE MG/DL
HCT VFR BLD AUTO: 46.2 % (ref 40–53)
HDLC SERPL-MCNC: 51 MG/DL
HGB BLD-MCNC: 15.3 G/DL (ref 13.3–17.7)
HGB UR QL STRIP: ABNORMAL
KETONES UR STRIP-MCNC: NEGATIVE MG/DL
LDLC SERPL CALC-MCNC: 143 MG/DL
LEUKOCYTE ESTERASE UR QL STRIP: NEGATIVE
MCH RBC QN AUTO: 31.4 PG (ref 26.5–33)
MCHC RBC AUTO-ENTMCNC: 33.1 G/DL (ref 31.5–36.5)
MCV RBC AUTO: 95 FL (ref 78–100)
MICROALBUMIN UR-MCNC: <12 MG/L
MICROALBUMIN/CREAT UR: NORMAL MG/G{CREAT}
NITRATE UR QL: NEGATIVE
NONHDLC SERPL-MCNC: 178 MG/DL
PH UR STRIP: 5.5 [PH] (ref 5–7)
PLATELET # BLD AUTO: 221 10E3/UL (ref 150–450)
POTASSIUM SERPL-SCNC: 4.6 MMOL/L (ref 3.4–5.3)
PROT SERPL-MCNC: 7 G/DL (ref 6.4–8.3)
PSA SERPL DL<=0.01 NG/ML-MCNC: 0.46 NG/ML (ref 0–4.5)
RBC # BLD AUTO: 4.87 10E6/UL (ref 4.4–5.9)
RBC #/AREA URNS AUTO: NORMAL /HPF
SODIUM SERPL-SCNC: 140 MMOL/L (ref 136–145)
SP GR UR STRIP: 1.02 (ref 1–1.03)
TRIGL SERPL-MCNC: 173 MG/DL
TSH SERPL DL<=0.005 MIU/L-ACNC: 1.91 UIU/ML (ref 0.3–4.2)
URATE SERPL-MCNC: 4.5 MG/DL (ref 3.4–7)
UROBILINOGEN UR STRIP-ACNC: 0.2 E.U./DL
VIT B12 SERPL-MCNC: 527 PG/ML (ref 232–1245)
WBC # BLD AUTO: 4.9 10E3/UL (ref 4–11)
WBC #/AREA URNS AUTO: NORMAL /HPF

## 2023-03-29 PROCEDURE — 82043 UR ALBUMIN QUANTITATIVE: CPT | Performed by: FAMILY MEDICINE

## 2023-03-29 PROCEDURE — 90677 PCV20 VACCINE IM: CPT | Performed by: FAMILY MEDICINE

## 2023-03-29 PROCEDURE — 82550 ASSAY OF CK (CPK): CPT | Performed by: FAMILY MEDICINE

## 2023-03-29 PROCEDURE — 81001 URINALYSIS AUTO W/SCOPE: CPT | Performed by: FAMILY MEDICINE

## 2023-03-29 PROCEDURE — 99396 PREV VISIT EST AGE 40-64: CPT | Mod: 25 | Performed by: FAMILY MEDICINE

## 2023-03-29 PROCEDURE — G0103 PSA SCREENING: HCPCS | Performed by: FAMILY MEDICINE

## 2023-03-29 PROCEDURE — 85027 COMPLETE CBC AUTOMATED: CPT | Performed by: FAMILY MEDICINE

## 2023-03-29 PROCEDURE — 90471 IMMUNIZATION ADMIN: CPT | Performed by: FAMILY MEDICINE

## 2023-03-29 PROCEDURE — 82306 VITAMIN D 25 HYDROXY: CPT | Performed by: FAMILY MEDICINE

## 2023-03-29 PROCEDURE — 0124A COVID-19 VACCINE BIVALENT BOOSTER 12+ (PFIZER): CPT | Performed by: FAMILY MEDICINE

## 2023-03-29 PROCEDURE — 80061 LIPID PANEL: CPT | Performed by: FAMILY MEDICINE

## 2023-03-29 PROCEDURE — 99214 OFFICE O/P EST MOD 30 MIN: CPT | Mod: 25 | Performed by: FAMILY MEDICINE

## 2023-03-29 PROCEDURE — 82570 ASSAY OF URINE CREATININE: CPT | Performed by: FAMILY MEDICINE

## 2023-03-29 PROCEDURE — 36415 COLL VENOUS BLD VENIPUNCTURE: CPT | Performed by: FAMILY MEDICINE

## 2023-03-29 PROCEDURE — 80053 COMPREHEN METABOLIC PANEL: CPT | Performed by: FAMILY MEDICINE

## 2023-03-29 PROCEDURE — 82607 VITAMIN B-12: CPT | Performed by: FAMILY MEDICINE

## 2023-03-29 PROCEDURE — 82728 ASSAY OF FERRITIN: CPT | Performed by: FAMILY MEDICINE

## 2023-03-29 PROCEDURE — 84443 ASSAY THYROID STIM HORMONE: CPT | Performed by: FAMILY MEDICINE

## 2023-03-29 PROCEDURE — 84550 ASSAY OF BLOOD/URIC ACID: CPT | Performed by: FAMILY MEDICINE

## 2023-03-29 PROCEDURE — 91312 COVID-19 VACCINE BIVALENT BOOSTER 12+ (PFIZER): CPT | Performed by: FAMILY MEDICINE

## 2023-03-29 PROCEDURE — 83921 ORGANIC ACID SINGLE QUANT: CPT | Performed by: FAMILY MEDICINE

## 2023-03-29 RX ORDER — ROPINIROLE 3 MG/1
3 TABLET, FILM COATED ORAL DAILY
Qty: 90 TABLET | Refills: 3 | Status: SHIPPED | OUTPATIENT
Start: 2023-03-29 | End: 2024-04-01

## 2023-03-29 RX ORDER — LISINOPRIL 20 MG/1
20 TABLET ORAL DAILY
Qty: 90 TABLET | Refills: 3 | Status: SHIPPED | OUTPATIENT
Start: 2023-03-29 | End: 2024-04-01

## 2023-03-29 RX ORDER — ALLOPURINOL 100 MG/1
TABLET ORAL
Qty: 90 TABLET | Refills: 3 | Status: SHIPPED | OUTPATIENT
Start: 2023-03-29 | End: 2024-04-01

## 2023-03-29 RX ORDER — ALLOPURINOL 300 MG/1
1 TABLET ORAL DAILY
Qty: 90 TABLET | Refills: 3 | Status: SHIPPED | OUTPATIENT
Start: 2023-03-29 | End: 2024-04-01

## 2023-03-29 RX ORDER — AMLODIPINE BESYLATE 5 MG/1
5 TABLET ORAL DAILY
Qty: 90 TABLET | Refills: 3 | Status: SHIPPED | OUTPATIENT
Start: 2023-03-29 | End: 2024-04-01

## 2023-03-29 ASSESSMENT — ENCOUNTER SYMPTOMS
DYSURIA: 0
CHILLS: 0
EYE PAIN: 0
FREQUENCY: 0
NAUSEA: 0
SORE THROAT: 0
PARESTHESIAS: 0
FEVER: 0
HEMATOCHEZIA: 0
ABDOMINAL PAIN: 0
ARTHRALGIAS: 0
CONSTIPATION: 0
DIZZINESS: 0
MYALGIAS: 0
DIARRHEA: 0
PALPITATIONS: 0
HEARTBURN: 0
NERVOUS/ANXIOUS: 0
SHORTNESS OF BREATH: 0
HEMATURIA: 0
HEADACHES: 0
COUGH: 0
JOINT SWELLING: 0
WEAKNESS: 0

## 2023-03-29 NOTE — PROGRESS NOTES
[facility], formerly [facility]  [place]    SUBJECTIVE    CC: Michael is an 63 year old who presents for preventative health visit.     No flowsheet data found.     Patient has been advised of split billing requirements and indicates understanding: Yes     Healthy Habits:     Getting at least 3 servings of Calcium per day:  Yes    Bi-annual eye exam:  Yes    Dental care twice a year:  Yes    Sleep apnea or symptoms of sleep apnea:  None    Diet:  Low salt    Frequency of exercise:  1 day/week    Duration of exercise:  15-30 minutes    Taking medications regularly:  Yes    Medication side effects:  None    PHQ-2 Total Score: 0    Additional concerns today:  No    Noting memory changes - desires testing, mom with advancing dementia    Hypertension Follow-up      Do you check your blood pressure regularly outside of the clinic? Yes     Are you following a low salt diet? Yes    Are your blood pressures ever more than 140 on the top number (systolic) OR more   than 90 on the bottom number (diastolic), for example 140/90? No     BP Readings from Last 3 Encounters:   03/29/23 122/80   12/27/22 138/84   10/11/22 136/82     Creatinine   Date Value Ref Range Status   01/19/2022 1.07 0.66 - 1.25 mg/dL Final   11/19/2020 1.06 0.66 - 1.25 mg/dL Final     GFR Estimate   Date Value Ref Range Status   01/19/2022 78 >60 mL/min/1.73m2 Final     Comment:     Effective December 21, 2021 eGFRcr in adults is calculated using the 2021 CKD-EPI creatinine equation which includes age and gender (Yumiko et al., NEJ, DOI: 10.1056/EYXBjj7458790)   11/19/2020 75 >60 mL/min/[1.73_m2] Final     Comment:     Non  GFR Calc  Starting 12/18/2018, serum creatinine based estimated GFR (eGFR) will be   calculated using the Chronic Kidney Disease Epidemiology Collaboration   (CKD-EPI) equation.       Lipids    Recent Labs   Lab Test 01/19/22  1427 11/19/20  0813   CHOL 207* 259*   HDL 58 66   * 180*   TRIG 108 64     RAMIREZ - Dr Keating - [facility] [place] -  cortisone / hyaluronic acid    Gout - no recent issues - allopurinol    Uric Acid   Date Value Ref Range Status   08/26/2022 4.1 3.5 - 7.2 mg/dL Final   11/19/2020 5.3 3.5 - 7.2 mg/dL Final     RLS - Requip helps    Today's PHQ-2 Score:   PHQ-2 ( 1999 Pfizer) 3/28/2023   Q1: Little interest or pleasure in doing things 0   Q2: Feeling down, depressed or hopeless 0   PHQ-2 Score 0   PHQ-2 Total Score (12-17 Years)- Positive if 3 or more points; Administer PHQ-A if positive -   Q1: Little interest or pleasure in doing things Not at all   Q2: Feeling down, depressed or hopeless Not at all   PHQ-2 Score 0     Social History     Tobacco Use     Smoking status: Never     Smokeless tobacco: Never   Substance Use Topics     Alcohol use: Yes     Alcohol/week: 5.0 standard drinks     Types: 5 Standard drinks or equivalent per week         Alcohol Use 3/28/2023   Prescreen: >3 drinks/day or >7 drinks/week? No   AUDIT SCORE  -     AUDIT - Alcohol Use Disorders Identification Test - Reproduced from the World Health Organization Audit 2001 (Second Edition) 1/19/2022   1.  How often do you have a drink containing alcohol? 2 to 3 times a week   2.  How many drinks containing alcohol do you have on a typical day when you are drinking? 1 or 2   3.  How often do you have five or more drinks on one occasion? Never   4.  How often during the last year have you found that you were not able to stop drinking once you had started? Never   5.  How often during the last year have you failed to do what was normally expected of you because of drinking? Never   6.  How often during the last year have you needed a first drink in the morning to get yourself going after a heavy drinking session? Never   7.  How often during the last year have you had a feeling of guilt or remorse after drinking? Never   8.  How often during the last year have you been unable to remember what happened the night before because of your drinking? Never   9.  Have you or  someone else been injured because of your drinking? No   10. Has a relative, friend, doctor or other health care worker been concerned about your drinking or suggested you cut down? No   TOTAL SCORE 3       Last PSA:   PSA   Date Value Ref Range Status   11/19/2020 0.49 0 - 4 ug/L Final     Comment:     Assay Method:  Chemiluminescence using Siemens Vista analyzer     Prostate Specific Antigen Screen   Date Value Ref Range Status   01/19/2022 0.50 0.00 - 4.00 ug/L Final       Reviewed orders with patient. Reviewed health maintenance and updated orders accordingly - Yes      Reviewed and updated as needed this visit by clinical staff   Tobacco  Allergies  Meds  Problems  Med Hx  Surg Hx  Fam Hx          Reviewed and updated as needed this visit by Provider                 Review of Systems   Constitutional: Negative for chills and fever.   HENT: Negative for congestion, ear pain, hearing loss and sore throat.    Eyes: Negative for pain and visual disturbance.   Respiratory: Negative for cough and shortness of breath.    Cardiovascular: Negative for chest pain, palpitations and peripheral edema.   Gastrointestinal: Negative for abdominal pain, constipation, diarrhea, heartburn, hematochezia and nausea.   Genitourinary: Negative for dysuria, frequency, genital sores, hematuria, impotence, penile discharge and urgency.   Musculoskeletal: Negative for arthralgias, joint swelling and myalgias.   Skin: Negative for rash.   Neurological: Negative for dizziness, weakness, headaches and paresthesias.   Psychiatric/Behavioral: Negative for mood changes. The patient is not nervous/anxious.      Health Maintenance     Colonoscopy:  Due 1/2024   FIT:  given              PSA:  pending   DEXA:  NA    Health Maintenance Due   Topic Date Due     INFLUENZA VACCINE (1) 09/01/2022       Current Problem List    Patient Active Problem List   Diagnosis     Hypertension goal BP (blood pressure) < 140/90     Hyperlipidemia LDL goal  <130     Idiopathic chronic gout of 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     Restless legs syndrome (RLS)     History of colonic polyps       Past Medical History    Past Medical History:   Diagnosis Date     History of colonic polyps 01/2019     Hyperlipidemia LDL goal <130      Hypertension goal BP (blood pressure) < 140/90      Idiopathic chronic gout of 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      Restless legs syndrome (RLS)        Past Surgical History    Past Surgical History:   Procedure Laterality Date     APPENDECTOMY  2008     COLONOSCOPY  01/2019    colon polyp, due 5 yrs       Current Medications    Current Outpatient Medications   Medication Sig Dispense Refill     allopurinol (ZYLOPRIM) 100 MG tablet Take in addition to 300mg tablet, 400 mg every day. 90 tablet 3     allopurinol (ZYLOPRIM) 300 MG tablet Take 1 tablet (300 mg) by mouth daily 90 tablet 3     amLODIPine (NORVASC) 5 MG tablet Take 1 tablet (5 mg) by mouth daily 90 tablet 3     lisinopril (ZESTRIL) 20 MG tablet Take 1 tablet (20 mg) by mouth daily 90 tablet 3     rOPINIRole (REQUIP) 3 MG tablet Take 1 tablet (3 mg) by mouth daily 90 tablet 3       Allergies    No Known Allergies    Immunizations    Immunization History   Administered Date(s) Administered     COVID-19 Vaccine 12+ (Pfizer 2022) 01/19/2022     COVID-19 Vaccine 12+ (Pfizer) 04/01/2021, 04/27/2021     COVID-19 Vaccine Bivalent Booster 12+ (Pfizer) 03/29/2023     DTaP, Unspecified 01/30/2009     Hepatitis A (ADULT 19+) 01/01/2000, 06/15/2000     Hepatitis B, Adult 01/01/1995, 03/01/1995, 06/15/1995     Influenza (IIV3) PF 10/03/2013     Influenza Vaccine 50-64 or 18-64 w/egg allergy (Flublok) 10/28/2019, 11/04/2020, 01/19/2022     Influenza Vaccine >6 months (Alfuria,Fluzone) 10/14/2014, 10/28/2019     Pneumococcal 20 valent Conjugate (Prevnar 20) 03/29/2023     Pneumococcal 23 valent 11/04/2020     TDAP (Adacel,Boostrix) 01/30/2009     Td (Adult), Adsorbed 10/28/2019     Tdap (Adult)  Unspecified Formulation 01/30/2009     Zoster recombinant adjuvanted (SHINGRIX) 11/19/2020, 01/20/2021       Family History    Family History   Problem Relation Age of Onset     Arthritis Mother      Heart Disease Father      Hypertension Father        Social History    Social History     Socioeconomic History     Marital status: Single     Spouse name: OBEY Villafuerte     Number of children: 2     Years of education: 14     Highest education level: Not on file   Occupational History     Not on file   Tobacco Use     Smoking status: Never     Smokeless tobacco: Never   Vaping Use     Vaping Use: Never used   Substance and Sexual Activity     Alcohol use: Yes     Alcohol/week: 5.0 standard drinks     Types: 5 Standard drinks or equivalent per week     Drug use: Never     Sexual activity: Yes   Other Topics Concern     Not on file   Social History Narrative     Not on file     Social Determinants of Health     Financial Resource Strain: Not on file   Food Insecurity: Not on file   Transportation Needs: Not on file   Physical Activity: Not on file   Stress: Not on file   Social Connections: Not on file   Intimate Partner Violence: Not on file   Housing Stability: Not on file       ROS    CONSTITUTIONAL: NEGATIVE for fever, chills, change in weight  INTEGUMENTARY/SKIN: NEGATIVE for worrisome rashes, moles or lesions  EYES: NEGATIVE for vision changes or irritation  ENT/MOUTH: NEGATIVE for ear, mouth and throat problems  RESP: NEGATIVE for significant cough or SOB  BREAST: NEGATIVE for masses, tenderness or discharge  CV: NEGATIVE for chest pain, palpitations or peripheral edema  GI: NEGATIVE for nausea, abdominal pain, heartburn, or change in bowel habits  : NEGATIVE for frequency, dysuria, or hematuria  MUSCULOSKELETAL: NEGATIVE for significant arthralgias or myalgia  NEURO: NEGATIVE for weakness, dizziness or paresthesias  ENDOCRINE: NEGATIVE for temperature intolerance, skin/hair changes  HEME: NEGATIVE for bleeding  "problems  PSYCHIATRIC: NEGATIVE for changes in mood or affect    OBJECTIVE    /80   Pulse 87   Temp 97.6  F (36.4  C) (Tympanic)   Resp 16   Ht 1.88 m (6' 2\")   Wt 116.2 kg (256 lb 1.6 oz)   SpO2 98%   BMI 32.88 kg/m      EXAM:    GENERAL: healthy, alert and no distress  EYES: Eyes grossly normal to inspection, PERRL and conjunctivae and sclerae normal  HENT: ear canals and TM's normal, nose and mouth without ulcers or lesions  NECK: no adenopathy, no asymmetry, masses, or scars and thyroid normal to palpation  RESP: lungs clear to auscultation - no rales, rhonchi or wheezes  CV: regular rate and rhythm, normal S1 S2, no S3 or S4, no murmur, click or rub, no peripheral edema and peripheral pulses strong  ABDOMEN: soft, nontender, no hepatosplenomegaly, no masses and bowel sounds normal   (male): testicles normal without atrophy or masses, no hernias and penis normal without urethral discharge  RECTAL: normal sphincter tone, no rectal masses, prostate smooth, nontender without masses/nodules and prostate trace+ enlarged, nontender  MS: no gross musculoskeletal defects noted, no edema  SKIN: no suspicious lesions or rashes  NEURO: Normal strength and tone, mentation intact and speech normal  PSYCH: mentation appears normal, affect normal/bright  LYMPH: no cervical, supraclavicular, axillary, or inguinal adenopathy    DIAGNOSTICS/PROCEDURES    Pending    ASSESSMENT      ICD-10-CM    1. Routine general medical examination at a health care facility  Z00.00 Comprehensive metabolic panel     Lipid panel reflex to direct LDL Fasting     CBC with platelets     CK total     UA Macroscopic with reflex to Microscopic and Culture     Albumin Random Urine Quantitative with Creat Ratio     TSH with free T4 reflex     Prostate Specific Antigen Screen     Fecal colorectal cancer screen FIT     Ferritin     Uric acid     Comprehensive metabolic panel     Lipid panel reflex to direct LDL Fasting     CBC with platelets "     CK total     UA Macroscopic with reflex to Microscopic and Culture     Albumin Random Urine Quantitative with Creat Ratio     TSH with free T4 reflex     Prostate Specific Antigen Screen     Fecal colorectal cancer screen FIT     Ferritin     Uric acid     UA Microscopic with Reflex to Culture     REVIEW OF HEALTH MAINTENANCE PROTOCOL ORDERS      2. Memory changes  R41.3 Vitamin B12     Methylmalonic Acid     Vitamin D Deficiency     Adult Neuropsychology Referral     OFFICE/OUTPT VISIT,EST,LEVL IV      3. Family history of dementia  Z81.8 OFFICE/OUTPT VISIT,EST,LEVL IV      4. Hypertension goal BP (blood pressure) < 140/90  I10 Comprehensive metabolic panel     UA Macroscopic with reflex to Microscopic and Culture     Albumin Random Urine Quantitative with Creat Ratio     Comprehensive metabolic panel     UA Macroscopic with reflex to Microscopic and Culture     Albumin Random Urine Quantitative with Creat Ratio     UA Microscopic with Reflex to Culture     REVIEW OF HEALTH MAINTENANCE PROTOCOL ORDERS     amLODIPine (NORVASC) 5 MG tablet     lisinopril (ZESTRIL) 20 MG tablet     OFFICE/OUTPT VISIT,EST,LEVL IV     CANCELED: OFFICE/OUTPT VISIT,EST,LEVL III      5. Hyperlipidemia LDL goal <130  E78.5 Comprehensive metabolic panel     Lipid panel reflex to direct LDL Fasting     CK total     Comprehensive metabolic panel     Lipid panel reflex to direct LDL Fasting     CK total     REVIEW OF HEALTH MAINTENANCE PROTOCOL ORDERS     OFFICE/OUTPT VISIT,EST,LEVL IV     CANCELED: OFFICE/OUTPT VISIT,EST,LEVL III      6. Primary osteoarthritis involving multiple joints  M15.9 REVIEW OF HEALTH MAINTENANCE PROTOCOL ORDERS     OFFICE/OUTPT VISIT,EST,LEVL IV     CANCELED: OFFICE/OUTPT VISIT,EST,LEVL III      7. Idiopathic chronic gout of 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  M1A.09X0 Uric acid     Uric acid     REVIEW OF HEALTH MAINTENANCE PROTOCOL ORDERS     allopurinol (ZYLOPRIM) 100 MG tablet     allopurinol (ZYLOPRIM) 300 MG  tablet     OFFICE/OUTPT VISIT,EST,LEVL IV     CANCELED: OFFICE/OUTPT VISIT,EST,LEVL III      8. Restless legs syndrome (RLS)  G25.81 Ferritin     Ferritin     REVIEW OF HEALTH MAINTENANCE PROTOCOL ORDERS     rOPINIRole (REQUIP) 3 MG tablet     OFFICE/OUTPT VISIT,EST,LEVL IV     CANCELED: OFFICE/OUTPT VISIT,EST,LEVL III      9. History of colonic polyps  Z86.010 Fecal colorectal cancer screen FIT     Fecal colorectal cancer screen FIT     REVIEW OF HEALTH MAINTENANCE PROTOCOL ORDERS     OFFICE/OUTPT VISIT,EST,LEVL IV     CANCELED: OFFICE/OUTPT VISIT,EST,LEVL III      10. Screen for colon cancer  Z12.11 Fecal colorectal cancer screen FIT     Fecal colorectal cancer screen FIT     REVIEW OF HEALTH MAINTENANCE PROTOCOL ORDERS     OFFICE/OUTPT VISIT,EST,LEVL IV     CANCELED: OFFICE/OUTPT VISIT,EST,LEVL III      11. Screening for prostate cancer  Z12.5 Prostate Specific Antigen Screen     Prostate Specific Antigen Screen     REVIEW OF HEALTH MAINTENANCE PROTOCOL ORDERS     OFFICE/OUTPT VISIT,EST,LEVL IV     CANCELED: OFFICE/OUTPT VISIT,EST,LEVL III      12. Medication monitoring encounter  Z51.81 Comprehensive metabolic panel     Lipid panel reflex to direct LDL Fasting     CBC with platelets     CK total     UA Macroscopic with reflex to Microscopic and Culture     Albumin Random Urine Quantitative with Creat Ratio     TSH with free T4 reflex     Ferritin     Uric acid     Comprehensive metabolic panel     Lipid panel reflex to direct LDL Fasting     CBC with platelets     CK total     UA Macroscopic with reflex to Microscopic and Culture     Albumin Random Urine Quantitative with Creat Ratio     TSH with free T4 reflex     Ferritin     Uric acid     UA Microscopic with Reflex to Culture     REVIEW OF HEALTH MAINTENANCE PROTOCOL ORDERS     OFFICE/OUTPT VISIT,EST,LEVL IV     CANCELED: OFFICE/OUTPT VISIT,EST,LEVL III      13. Chronic gout of knee, unspecified cause, unspecified laterality  M1A.0690 OFFICE/OUTPT  VISIT,EST,LEVL IV      14. Need for COVID-19 vaccine  Z23 COVID-19 VACCINE BIVALENT BOOSTER 12+ (PFIZER)     OFFICE/OUTPT VISIT,EST,LEVL IV      15. Need for pneumococcal vaccination  Z23 PNEUMOCOCCAL 20 VALENT CONJUGATE (PREVNAR 20)     OFFICE/OUTPT VISIT,EST,LEVL IV      16. Class 1 obesity with serious comorbidity and body mass index (BMI) of 32.0 to 32.9 in adult, unspecified obesity type  E66.9     Z68.32           PLAN    Discussed treatment/modality options, including risk and benefits, he desires:    advised alcohol consumption 1oz per day or less, advised aspirin 81 mg po daily, advised 1 multivitamin per day, advised calcium 1134-7962 mg/d and Vitamin D 800-1200 IU/d, advised dentist every 6 months, advised diet, exercise, and weight loss, advised opthalmologist every 1-2 years, advised self testicular exam q month, further health care maintenance, further lab(s), immunization(s), medication refill(s), referral(s) and observation    Discussed controversies surrounding PSA. Specifically reviewed 2017 USPSTF findings recommending discussion of PSA testing for men ages 55-69.  Reviewed findings of the  Randomized Study of Screening for Prostate Cancer which showed a 30% reduction in advanced stage prostate cancer and a 20% reduction in death rate from prostate cancer in this age group. PSA-based screening may prevent up to 2 deaths and up to 3 cases of metastatic disease per 1,000 men screened over 13 years.    We've elected to do PSA this year after discussing these controversies.    All diagnosis above reviewed and noted above, otherwise stable.      See Zuki orders for further details.      1) med refills    2) labs pending    3) immunizations - prevnar / pfizer bivalent    4) neuropsych testing    5) FSOC prn    6) colonoscopy 1/2024    Return in about 1 year (around 3/29/2024) for Complete Physical, Medication Recheck Visit, Follow Up Chronic.    Health Maintenance Due   Topic Date Due      "INFLUENZA VACCINE (1) 09/01/2022       COUNSELING    Reviewed preventive health counseling, as reflected in patient instructions    BP Readings from Last 1 Encounters:   03/29/23 122/80     Estimated body mass index is 32.88 kg/m  as calculated from the following:    Height as of this encounter: 1.88 m (6' 2\").    Weight as of this encounter: 116.2 kg (256 lb 1.6 oz).      Weight management plan: diet and exercise     reports that he has never smoked. He has never used smokeless tobacco.      Counseling Resources:    ATP IV Guidelines  Pooled Cohorts Equation Calculator  FRAX Risk Assessment  ICSI Preventive Guidelines  Dietary Guidelines for Americans, 2010  USDA's MyPlate  ASA Prophylaxis  Lung CA Screening           Kal Briceño MD, FAAFP     [facility]  [address] [place] [id number]  [email or web address]   Office: [phone]  Fax: [phone]  Pager: [phone]     "

## 2023-03-29 NOTE — LETTER
[facility]  [address] [place] [address]  [phone]                    April 3, 2023    Skip Conner  [address] [address] [address]      Dear Skip,    Here is a summary of your recent test results:    Labs are overall quite good     Mildly elevated lipids   Minimally elevated glucose     We advise:     Continue current cares.   Balanced low cholesterol diet.   Regular exercise.   Weight loss.     Recheck fasting visit in 4-6 months     Your test results are enclosed.             Thank you very much for trusting [facility].     Healthy regards,          Kal Briceño M.D.        Results for orders placed or performed in visit on 03/29/23   Comprehensive metabolic panel     Status: Abnormal   Result Value Ref Range    Sodium 140 136 - 145 mmol/L    Potassium 4.6 3.4 - 5.3 mmol/L    Chloride 104 98 - 107 mmol/L    Carbon Dioxide (CO2) 25 22 - 29 mmol/L    Anion Gap 11 7 - 15 mmol/L    Urea Nitrogen 15.4 8.0 - 23.0 mg/dL    Creatinine 1.06 0.67 - 1.17 mg/dL    Calcium 9.5 8.8 - 10.2 mg/dL    Glucose 102 (H) 70 - 99 mg/dL    Alkaline Phosphatase 62 40 - 129 U/L    AST 30 10 - 50 U/L    ALT 34 10 - 50 U/L    Protein Total 7.0 6.4 - 8.3 g/dL    Albumin 4.2 3.5 - 5.2 g/dL    Bilirubin Total 0.7 <=1.2 mg/dL    GFR Estimate 78 >60 mL/min/1.73m2   Lipid panel reflex to direct LDL Fasting     Status: Abnormal   Result Value Ref Range    Cholesterol 229 (H) <200 mg/dL    Triglycerides 173 (H) <150 mg/dL    Direct Measure HDL 51 >=40 mg/dL    LDL Cholesterol Calculated 143 (H) <=100 mg/dL    Non HDL Cholesterol 178 (H) <130 mg/dL    Narrative    Cholesterol  Desirable:  <200 mg/dL    Triglycerides  Normal:  Less than 150 mg/dL  Borderline High:  150-199 mg/dL  High:  200-499 mg/dL  Very High:  Greater than or equal to 500 mg/dL    Direct Measure HDL  Female:  Greater than or equal to 50 mg/dL   Male:  Greater than or equal to 40 mg/dL    LDL  Cholesterol  Desirable:  <100mg/dL  Above Desirable:  100-129 mg/dL   Borderline High:  130-159 mg/dL   High:  160-189 mg/dL   Very High:  >= 190 mg/dL    Non HDL Cholesterol  Desirable:  130 mg/dL  Above Desirable:  130-159 mg/dL  Borderline High:  160-189 mg/dL  High:  190-219 mg/dL  Very High:  Greater than or equal to 220 mg/dL   CBC with platelets     Status: Normal   Result Value Ref Range    WBC Count 4.9 4.0 - 11.0 10e3/uL    RBC Count 4.87 4.40 - 5.90 10e6/uL    Hemoglobin 15.3 13.3 - 17.7 g/dL    Hematocrit 46.2 40.0 - 53.0 %    MCV 95 78 - 100 fL    MCH 31.4 26.5 - 33.0 pg    MCHC 33.1 31.5 - 36.5 g/dL    RDW 12.7 10.0 - 15.0 %    Platelet Count 221 150 - 450 10e3/uL   CK total     Status: Normal   Result Value Ref Range     39 - 308 U/L   UA Macroscopic with reflex to Microscopic and Culture     Status: Abnormal    Specimen: Urine, NOS   Result Value Ref Range    Color Urine Yellow Colorless, Straw, Light Yellow, Yellow    Appearance Urine Clear Clear    Glucose Urine Negative Negative mg/dL    Bilirubin Urine Negative Negative    Ketones Urine Negative Negative mg/dL    Specific Gravity Urine 1.020 1.003 - 1.035    Blood Urine Trace (A) Negative    pH Urine 5.5 5.0 - 7.0    Protein Albumin Urine Negative Negative mg/dL    Urobilinogen Urine 0.2 0.2, 1.0 E.U./dL    Nitrite Urine Negative Negative    Leukocyte Esterase Urine Negative Negative   Albumin Random Urine Quantitative with Creat Ratio     Status: None   Result Value Ref Range    Creatinine Urine mg/dL 128.0 mg/dL    Albumin Urine mg/L <12.0 mg/L    Albumin Urine mg/g Cr     TSH with free T4 reflex     Status: Normal   Result Value Ref Range    TSH 1.91 0.30 - 4.20 uIU/mL   Prostate Specific Antigen Screen     Status: Normal   Result Value Ref Range    Prostate Specific Antigen Screen 0.46 0.00 - 4.50 ng/mL    Narrative    This result is obtained using the Roche Elecsys total PSA method on the melissa e801 immunoassay analyzer. Results  obtained with different assay methods or kits cannot be used interchangeably.   Ferritin     Status: Normal   Result Value Ref Range    Ferritin 379 31 - 409 ng/mL   Uric acid     Status: Normal   Result Value Ref Range    Uric Acid 4.5 3.4 - 7.0 mg/dL   UA Microscopic with Reflex to Culture     Status: Normal   Result Value Ref Range    RBC Urine None Seen 0-2 /HPF /HPF    WBC Urine None Seen 0-5 /HPF /HPF    Narrative    Urine Culture not indicated   Vitamin B12     Status: Normal   Result Value Ref Range    Vitamin B12 527 232 - 1,245 pg/mL   Vitamin D Deficiency     Status: Normal   Result Value Ref Range    Vitamin D, Total (25-Hydroxy) 40 20 - 75 ug/L    Narrative    Season, race, dietary intake, and treatment affect the concentration of 25-hydroxy-Vitamin D. Values may decrease during winter months and increase during summer months. Values 20-29 ug/L may indicate Vitamin D insufficiency and values <20 ug/L may indicate Vitamin D deficiency.    Vitamin D determination is routinely performed by an immunoassay specific for 25 hydroxyvitamin D3.  If an individual is on vitamin D2(ergocalciferol) supplementation, please specify 25 OH vitamin D2 and D3 level determination by LCMSMS test [id number].

## 2023-03-29 NOTE — LETTER
[facility]  [address] [place] [address]  [phone]                    April 4, 2023    Skip Conner  [address] [facility] [address]      Dear Skip,    Here is a summary of your recent test results:      -FIT test (screening test for colon cancer) was normal.     We advise:     FIT annually   Colonoscopy due in January, 2024.     Your test results are enclosed.               Thank you very much for trusting [facility].     Healthy regards,          Kal Briceño M.D.        Results for orders placed or performed in visit on 03/29/23   Comprehensive metabolic panel     Status: Abnormal   Result Value Ref Range    Sodium 140 136 - 145 mmol/L    Potassium 4.6 3.4 - 5.3 mmol/L    Chloride 104 98 - 107 mmol/L    Carbon Dioxide (CO2) 25 22 - 29 mmol/L    Anion Gap 11 7 - 15 mmol/L    Urea Nitrogen 15.4 8.0 - 23.0 mg/dL    Creatinine 1.06 0.67 - 1.17 mg/dL    Calcium 9.5 8.8 - 10.2 mg/dL    Glucose 102 (H) 70 - 99 mg/dL    Alkaline Phosphatase 62 40 - 129 U/L    AST 30 10 - 50 U/L    ALT 34 10 - 50 U/L    Protein Total 7.0 6.4 - 8.3 g/dL    Albumin 4.2 3.5 - 5.2 g/dL    Bilirubin Total 0.7 <=1.2 mg/dL    GFR Estimate 78 >60 mL/min/1.73m2   Lipid panel reflex to direct LDL Fasting     Status: Abnormal   Result Value Ref Range    Cholesterol 229 (H) <200 mg/dL    Triglycerides 173 (H) <150 mg/dL    Direct Measure HDL 51 >=40 mg/dL    LDL Cholesterol Calculated 143 (H) <=100 mg/dL    Non HDL Cholesterol 178 (H) <130 mg/dL    Narrative    Cholesterol  Desirable:  <200 mg/dL    Triglycerides  Normal:  Less than 150 mg/dL  Borderline High:  150-199 mg/dL  High:  200-499 mg/dL  Very High:  Greater than or equal to 500 mg/dL    Direct Measure HDL  Female:  Greater than or equal to 50 mg/dL   Male:  Greater than or equal to 40 mg/dL    LDL Cholesterol  Desirable:  <100mg/dL  Above Desirable:  100-129 mg/dL   Borderline High:  130-159 mg/dL   High:   160-189 mg/dL   Very High:  >= 190 mg/dL    Non HDL Cholesterol  Desirable:  130 mg/dL  Above Desirable:  130-159 mg/dL  Borderline High:  160-189 mg/dL  High:  190-219 mg/dL  Very High:  Greater than or equal to 220 mg/dL   CBC with platelets     Status: Normal   Result Value Ref Range    WBC Count 4.9 4.0 - 11.0 10e3/uL    RBC Count 4.87 4.40 - 5.90 10e6/uL    Hemoglobin 15.3 13.3 - 17.7 g/dL    Hematocrit 46.2 40.0 - 53.0 %    MCV 95 78 - 100 fL    MCH 31.4 26.5 - 33.0 pg    MCHC 33.1 31.5 - 36.5 g/dL    RDW 12.7 10.0 - 15.0 %    Platelet Count 221 150 - 450 10e3/uL   CK total     Status: Normal   Result Value Ref Range     39 - 308 U/L   UA Macroscopic with reflex to Microscopic and Culture     Status: Abnormal    Specimen: Urine, NOS   Result Value Ref Range    Color Urine Yellow Colorless, Straw, Light Yellow, Yellow    Appearance Urine Clear Clear    Glucose Urine Negative Negative mg/dL    Bilirubin Urine Negative Negative    Ketones Urine Negative Negative mg/dL    Specific Gravity Urine 1.020 1.003 - 1.035    Blood Urine Trace (A) Negative    pH Urine 5.5 5.0 - 7.0    Protein Albumin Urine Negative Negative mg/dL    Urobilinogen Urine 0.2 0.2, 1.0 E.U./dL    Nitrite Urine Negative Negative    Leukocyte Esterase Urine Negative Negative   Albumin Random Urine Quantitative with Creat Ratio     Status: None   Result Value Ref Range    Creatinine Urine mg/dL 128.0 mg/dL    Albumin Urine mg/L <12.0 mg/L    Albumin Urine mg/g Cr     TSH with free T4 reflex     Status: Normal   Result Value Ref Range    TSH 1.91 0.30 - 4.20 uIU/mL   Prostate Specific Antigen Screen     Status: Normal   Result Value Ref Range    Prostate Specific Antigen Screen 0.46 0.00 - 4.50 ng/mL    Narrative    This result is obtained using the Roche Elecsys total PSA method on the melissa [id number] immunoassay analyzer. Results obtained with different assay methods or kits cannot be used interchangeably.   Fecal colorectal cancer screen FIT      Status: Normal   Result Value Ref Range    Occult Blood Screen FIT Negative Negative   Ferritin     Status: Normal   Result Value Ref Range    Ferritin 379 31 - 409 ng/mL   Uric acid     Status: Normal   Result Value Ref Range    Uric Acid 4.5 3.4 - 7.0 mg/dL   UA Microscopic with Reflex to Culture     Status: Normal   Result Value Ref Range    RBC Urine None Seen 0-2 /HPF /HPF    WBC Urine None Seen 0-5 /HPF /HPF    Narrative    Urine Culture not indicated   Vitamin B12     Status: Normal   Result Value Ref Range    Vitamin B12 527 232 - 1,245 pg/mL   Vitamin D Deficiency     Status: Normal   Result Value Ref Range    Vitamin D, Total (25-Hydroxy) 40 20 - 75 ug/L    Narrative    Season, race, dietary intake, and treatment affect the concentration of 25-hydroxy-Vitamin D. Values may decrease during winter months and increase during summer months. Values 20-29 ug/L may indicate Vitamin D insufficiency and values <20 ug/L may indicate Vitamin D deficiency.    Vitamin D determination is routinely performed by an immunoassay specific for 25 hydroxyvitamin D3.  If an individual is on vitamin D2(ergocalciferol) supplementation, please specify 25 OH vitamin D2 and D3 level determination by LCMSMS test VITD23.

## 2023-03-30 LAB — DEPRECATED CALCIDIOL+CALCIFEROL SERPL-MC: 40 UG/L (ref 20–75)

## 2023-03-30 PROCEDURE — 82274 ASSAY TEST FOR BLOOD FECAL: CPT | Performed by: FAMILY MEDICINE

## 2023-04-03 LAB — HEMOCCULT STL QL IA: NEGATIVE

## 2023-04-05 LAB — METHYLMALONATE SERPL-SCNC: 0.18 UMOL/L (ref 0–0.4)

## 2023-10-20 ENCOUNTER — PATIENT OUTREACH (OUTPATIENT)
Dept: GASTROENTEROLOGY | Facility: CLINIC | Age: 63
End: 2023-10-20
Payer: COMMERCIAL

## 2024-01-12 ENCOUNTER — TELEPHONE (OUTPATIENT)
Dept: FAMILY MEDICINE | Facility: CLINIC | Age: 64
End: 2024-01-12
Payer: COMMERCIAL

## 2024-01-12 DIAGNOSIS — Z86.0100 HISTORY OF COLONIC POLYPS: Primary | ICD-10-CM

## 2024-01-12 DIAGNOSIS — Z12.11 SCREEN FOR COLON CANCER: ICD-10-CM

## 2024-01-12 NOTE — TELEPHONE ENCOUNTER
To All:    Advise    CPX after 3/29/2024, please schedule  Colonoscopy due 1/2024, please order    Thanks       Order pended.     Routing to provider to review order and sign if appropriate.      Ananya Desir RN  [facility]

## 2024-01-15 NOTE — TELEPHONE ENCOUNTER
Placed call to patient to advise colonoscopy orders are placed, and he is due. Writer provided patient colonoscopy scheduling # [phone]. Patient doesn't have any further questions/concerns. Closing encounter.

## 2024-01-15 NOTE — TELEPHONE ENCOUNTER
Please call and inform patient.     Colonoscopy order was placed     Please be aware that coverage of these services is subject to the terms and limitations of your health insurance plan.  Call member services at your health plan with any benefit or coverage questions.  [facility] will call you to coordinate your care as prescribed by the provider.  If you don t hear from a representative within 2 business days, please call [phone].     Preventive is scheduled.   Future Appointments 1/15/2024 - 7/13/2024        Date Visit Type Length Department Provider     4/1/2024  2:30 PM [facility] PREVENTATIVE ADULT VISIT 30 min  FAMILY PRACTICE Kal Briceño MD    Location Instructions:     [facility] is located at [address] [place] [address], along [address] Free parking is available; access the lot by turning north from [address] onto [facility], then west onto [facility].                   Thank you.

## 2024-01-29 ENCOUNTER — TELEPHONE (OUTPATIENT)
Dept: GASTROENTEROLOGY | Facility: CLINIC | Age: 64
End: 2024-01-29
Payer: COMMERCIAL

## 2024-01-29 NOTE — TELEPHONE ENCOUNTER
"Endoscopy Scheduling Screen    Have you had a positive Covid test in the last 14 days?  No    Are you active on MyChart?   Yes    What insurance is in the chart?  Other:  [employer]     Ordering/Referring Provider:  CLEMENTINE WOMACK    (If ordering provider performs procedure, schedule with ordering provider unless otherwise instructed. )    BMI: Estimated body mass index is 32.88 kg/m  as calculated from the following:    Height as of 3/29/23: 1.88 m (6' 2\").    Weight as of 3/29/23: 116.2 kg (256 lb 1.6 oz).     Sedation Ordered  moderate sedation.   If patient BMI > 50 do not schedule in [facility].    If patient BMI > 45 do not schedule at [facility].    Are you taking methadone or Suboxone?  No    Have you had difficulties, pain, or discomfort during past endoscopy procedures?  No    Are you taking any prescription medications for pain 3 or more times per week?   NO - No RN review required.    Do you have a history of malignant hyperthermia or adverse reaction to anesthesia?  No    (Females) Are you currently pregnant?   No     Have you been diagnosed or told you have pulmonary hypertension?   No    Do you have an LVAD?  No    Have you been told you have moderate to severe sleep apnea?  No    Have you been told you have COPD, asthma, or any other lung disease?  No    Do you have any heart conditions?  No     Have you ever had an organ transplant?   No    Have you ever had or are you awaiting a heart or lung transplant?   No    Have you had a stroke or transient ischemic attack (TIA aka \"mini stroke\" in the last 6 months?   No    Have you been diagnosed with or been told you have cirrhosis of the liver?   No    Are you currently on dialysis?   No    Do you need assistance transferring?   No    BMI: Estimated body mass index is 32.88 kg/m  as calculated from the following:    Height as of 3/29/23: 1.88 m (6' 2\").    Weight as of 3/29/23: 116.2 kg (256 lb 1.6 oz).     Is patients BMI > 40 and scheduling location [facility]?  No    Do you take " an injectable medication for weight loss or diabetes (excluding insulin)?  No    Do you take the medication Naltrexone?  No    Do you take blood thinners?  No       Prep   Are you currently on dialysis or do you have chronic kidney disease?  No    Do you have a diagnosis of diabetes?  No    Do you have a diagnosis of cystic fibrosis (CF)?  No    On a regular basis do you go 3 -5 days between bowel movements?  No    BMI > 40?  No    Preferred Pharmacy:      [id number] [address] [address] [address]  Phone: [phone] Fax: [phone]      Final Scheduling Details   Colonoscopy prep sent?  Standard MiraLAX    Procedure scheduled  Colonoscopy    Surgeon:  Micaela      Date of procedure:  04/30/2024     Pre-OP / PAC:   No - Not required for this site.    Location  [facility] - Per order.    Sedation   Moderate Sedation - Per order.      Patient Reminders:   You will receive a call from a Nurse to review instructions and health history.  This assessment must be completed prior to your procedure.  Failure to complete the Nurse assessment may result in the procedure being cancelled.      On the day of your procedure, please designate an adult(s) who can drive you home stay with you for the next 24 hours. The medicines used in the exam will make you sleepy. You will not be able to drive.      You cannot take public transportation, ride share services, or non-medical taxi service without a responsible caregiver.  Medical transport services are allowed with the requirement that a responsible caregiver will receive you at your destination.  We require that drivers and caregivers are confirmed prior to your procedure.

## 2024-03-12 ENCOUNTER — MYC MEDICAL ADVICE (OUTPATIENT)
Dept: FAMILY MEDICINE | Facility: CLINIC | Age: 64
End: 2024-03-12
Payer: COMMERCIAL

## 2024-03-25 SDOH — HEALTH STABILITY: PHYSICAL HEALTH: ON AVERAGE, HOW MANY MINUTES DO YOU ENGAGE IN EXERCISE AT THIS LEVEL?: 30 MIN

## 2024-03-25 SDOH — HEALTH STABILITY: PHYSICAL HEALTH: ON AVERAGE, HOW MANY DAYS PER WEEK DO YOU ENGAGE IN MODERATE TO STRENUOUS EXERCISE (LIKE A BRISK WALK)?: 3 DAYS

## 2024-03-25 ASSESSMENT — SOCIAL DETERMINANTS OF HEALTH (SDOH): HOW OFTEN DO YOU GET TOGETHER WITH FRIENDS OR RELATIVES?: MORE THAN THREE TIMES A WEEK

## 2024-04-01 ENCOUNTER — OFFICE VISIT (OUTPATIENT)
Dept: FAMILY MEDICINE | Facility: CLINIC | Age: 64
End: 2024-04-01
Attending: FAMILY MEDICINE
Payer: COMMERCIAL

## 2024-04-01 VITALS
RESPIRATION RATE: 16 BRPM | HEART RATE: 80 BPM | SYSTOLIC BLOOD PRESSURE: 126 MMHG | BODY MASS INDEX: 32.98 KG/M2 | WEIGHT: 257 LBS | HEIGHT: 74 IN | OXYGEN SATURATION: 99 % | TEMPERATURE: 97.7 F | DIASTOLIC BLOOD PRESSURE: 86 MMHG

## 2024-04-01 DIAGNOSIS — Z86.0100 HISTORY OF COLONIC POLYPS: ICD-10-CM

## 2024-04-01 DIAGNOSIS — I10 HYPERTENSION GOAL BP (BLOOD PRESSURE) < 140/90: ICD-10-CM

## 2024-04-01 DIAGNOSIS — Z12.5 SCREENING FOR PROSTATE CANCER: ICD-10-CM

## 2024-04-01 DIAGNOSIS — M1A.09X0 IDIOPATHIC CHRONIC GOUT OF MULTIPLE SITES WITHOUT TOPHUS: ICD-10-CM

## 2024-04-01 DIAGNOSIS — Z12.11 SCREEN FOR COLON CANCER: ICD-10-CM

## 2024-04-01 DIAGNOSIS — Z00.00 ROUTINE GENERAL MEDICAL EXAMINATION AT A HEALTH CARE FACILITY: Primary | ICD-10-CM

## 2024-04-01 DIAGNOSIS — Z51.81 MEDICATION MONITORING ENCOUNTER: ICD-10-CM

## 2024-04-01 DIAGNOSIS — G25.81 RESTLESS LEGS SYNDROME (RLS): ICD-10-CM

## 2024-04-01 DIAGNOSIS — E78.5 HYPERLIPIDEMIA LDL GOAL <130: ICD-10-CM

## 2024-04-01 DIAGNOSIS — M17.0 PRIMARY OSTEOARTHRITIS OF BOTH KNEES: ICD-10-CM

## 2024-04-01 LAB
ALBUMIN SERPL BCG-MCNC: 4.6 G/DL (ref 3.5–5.2)
ALBUMIN UR-MCNC: NEGATIVE MG/DL
ALP SERPL-CCNC: 73 U/L (ref 40–150)
ALT SERPL W P-5'-P-CCNC: 37 U/L (ref 0–70)
ANION GAP SERPL CALCULATED.3IONS-SCNC: 12 MMOL/L (ref 7–15)
APPEARANCE UR: CLEAR
AST SERPL W P-5'-P-CCNC: 25 U/L (ref 0–45)
BILIRUB SERPL-MCNC: 0.7 MG/DL
BILIRUB UR QL STRIP: NEGATIVE
BUN SERPL-MCNC: 18.7 MG/DL (ref 8–23)
CALCIUM SERPL-MCNC: 9.6 MG/DL (ref 8.8–10.2)
CHLORIDE SERPL-SCNC: 103 MMOL/L (ref 98–107)
CHOLEST SERPL-MCNC: 236 MG/DL
CK SERPL-CCNC: 152 U/L (ref 39–308)
COLOR UR AUTO: YELLOW
CREAT SERPL-MCNC: 1.07 MG/DL (ref 0.67–1.17)
CREAT UR-MCNC: 145 MG/DL
DEPRECATED HCO3 PLAS-SCNC: 24 MMOL/L (ref 22–29)
EGFRCR SERPLBLD CKD-EPI 2021: 77 ML/MIN/1.73M2
ERYTHROCYTE [DISTWIDTH] IN BLOOD BY AUTOMATED COUNT: 12.4 % (ref 10–15)
FASTING STATUS PATIENT QL REPORTED: YES
FERRITIN SERPL-MCNC: 432 NG/ML (ref 31–409)
GLUCOSE SERPL-MCNC: 103 MG/DL (ref 70–99)
GLUCOSE UR STRIP-MCNC: NEGATIVE MG/DL
HCT VFR BLD AUTO: 46.4 % (ref 40–53)
HDLC SERPL-MCNC: 53 MG/DL
HGB BLD-MCNC: 15.7 G/DL (ref 13.3–17.7)
HGB UR QL STRIP: NEGATIVE
KETONES UR STRIP-MCNC: NEGATIVE MG/DL
LDLC SERPL CALC-MCNC: 147 MG/DL
LEUKOCYTE ESTERASE UR QL STRIP: NEGATIVE
MCH RBC QN AUTO: 31.8 PG (ref 26.5–33)
MCHC RBC AUTO-ENTMCNC: 33.8 G/DL (ref 31.5–36.5)
MCV RBC AUTO: 94 FL (ref 78–100)
MICROALBUMIN UR-MCNC: <12 MG/L
MICROALBUMIN/CREAT UR: NORMAL MG/G{CREAT}
NITRATE UR QL: NEGATIVE
NONHDLC SERPL-MCNC: 183 MG/DL
PH UR STRIP: 5.5 [PH] (ref 5–7)
PLATELET # BLD AUTO: 228 10E3/UL (ref 150–450)
POTASSIUM SERPL-SCNC: 4.5 MMOL/L (ref 3.4–5.3)
PROT SERPL-MCNC: 7.4 G/DL (ref 6.4–8.3)
PSA SERPL DL<=0.01 NG/ML-MCNC: 0.58 NG/ML (ref 0–4.5)
RBC # BLD AUTO: 4.94 10E6/UL (ref 4.4–5.9)
SODIUM SERPL-SCNC: 139 MMOL/L (ref 135–145)
SP GR UR STRIP: 1.02 (ref 1–1.03)
TRIGL SERPL-MCNC: 178 MG/DL
TSH SERPL DL<=0.005 MIU/L-ACNC: 2.28 UIU/ML (ref 0.3–4.2)
URATE SERPL-MCNC: 4.4 MG/DL (ref 3.4–7)
UROBILINOGEN UR STRIP-ACNC: 0.2 E.U./DL
WBC # BLD AUTO: 5.5 10E3/UL (ref 4–11)

## 2024-04-01 PROCEDURE — 99397 PER PM REEVAL EST PAT 65+ YR: CPT | Performed by: FAMILY MEDICINE

## 2024-04-01 PROCEDURE — 84443 ASSAY THYROID STIM HORMONE: CPT | Performed by: FAMILY MEDICINE

## 2024-04-01 PROCEDURE — 82570 ASSAY OF URINE CREATININE: CPT | Performed by: FAMILY MEDICINE

## 2024-04-01 PROCEDURE — 82728 ASSAY OF FERRITIN: CPT | Performed by: FAMILY MEDICINE

## 2024-04-01 PROCEDURE — 80053 COMPREHEN METABOLIC PANEL: CPT | Performed by: FAMILY MEDICINE

## 2024-04-01 PROCEDURE — 82043 UR ALBUMIN QUANTITATIVE: CPT | Performed by: FAMILY MEDICINE

## 2024-04-01 PROCEDURE — 80061 LIPID PANEL: CPT | Performed by: FAMILY MEDICINE

## 2024-04-01 PROCEDURE — 82550 ASSAY OF CK (CPK): CPT | Performed by: FAMILY MEDICINE

## 2024-04-01 PROCEDURE — 84550 ASSAY OF BLOOD/URIC ACID: CPT | Performed by: FAMILY MEDICINE

## 2024-04-01 PROCEDURE — 81003 URINALYSIS AUTO W/O SCOPE: CPT | Performed by: FAMILY MEDICINE

## 2024-04-01 PROCEDURE — 85027 COMPLETE CBC AUTOMATED: CPT | Performed by: FAMILY MEDICINE

## 2024-04-01 PROCEDURE — G0103 PSA SCREENING: HCPCS | Performed by: FAMILY MEDICINE

## 2024-04-01 PROCEDURE — 36415 COLL VENOUS BLD VENIPUNCTURE: CPT | Performed by: FAMILY MEDICINE

## 2024-04-01 PROCEDURE — 99214 OFFICE O/P EST MOD 30 MIN: CPT | Mod: 25 | Performed by: FAMILY MEDICINE

## 2024-04-01 RX ORDER — ALLOPURINOL 300 MG/1
1 TABLET ORAL DAILY
Qty: 90 TABLET | Refills: 3 | Status: SHIPPED | OUTPATIENT
Start: 2024-04-01

## 2024-04-01 RX ORDER — ROPINIROLE 1 MG/1
1 TABLET, FILM COATED ORAL EVERY MORNING
Qty: 90 TABLET | Refills: 3 | Status: SHIPPED | OUTPATIENT
Start: 2024-04-01

## 2024-04-01 RX ORDER — AMLODIPINE BESYLATE 5 MG/1
5 TABLET ORAL DAILY
Qty: 90 TABLET | Refills: 3 | Status: SHIPPED | OUTPATIENT
Start: 2024-04-01

## 2024-04-01 RX ORDER — ROPINIROLE 3 MG/1
3 TABLET, FILM COATED ORAL EVERY EVENING
Qty: 90 TABLET | Refills: 3 | Status: SHIPPED | OUTPATIENT
Start: 2024-04-01

## 2024-04-01 RX ORDER — ALLOPURINOL 100 MG/1
TABLET ORAL
Qty: 90 TABLET | Refills: 3 | Status: SHIPPED | OUTPATIENT
Start: 2024-04-01

## 2024-04-01 RX ORDER — LISINOPRIL 20 MG/1
20 TABLET ORAL DAILY
Qty: 90 TABLET | Refills: 3 | Status: SHIPPED | OUTPATIENT
Start: 2024-04-01

## 2024-04-01 NOTE — PROGRESS NOTES
Preventive Care Visit  [facility] [place]  Kal Briceño MD, Family Medicine  Apr 1, 2024      Assessment & Plan     Routine general medical examination at a health care facility    - Comprehensive metabolic panel  - Lipid panel reflex to direct LDL Fasting  - CBC with platelets  - CK total  - UA Macroscopic with reflex to Microscopic and Culture  - Albumin Random Urine Quantitative with Creat Ratio  - TSH with free T4 reflex  - Prostate Specific Antigen Screen  - Fecal colorectal cancer screen FIT  - Uric acid  - Ferritin  - REVIEW OF HEALTH MAINTENANCE PROTOCOL ORDERS  - Fecal colorectal cancer screen FIT  - Comprehensive metabolic panel  - Lipid panel reflex to direct LDL Fasting  - CBC with platelets  - CK total  - UA Macroscopic with reflex to Microscopic and Culture  - Albumin Random Urine Quantitative with Creat Ratio  - TSH with free T4 reflex  - Prostate Specific Antigen Screen  - Uric acid  - Ferritin  - PRIMARY CARE FOLLOW-UP SCHEDULING    Hypertension goal BP (blood pressure) < 140/90    - Comprehensive metabolic panel  - UA Macroscopic with reflex to Microscopic and Culture  - Albumin Random Urine Quantitative with Creat Ratio  - REVIEW OF HEALTH MAINTENANCE PROTOCOL ORDERS  - Comprehensive metabolic panel  - UA Macroscopic with reflex to Microscopic and Culture  - Albumin Random Urine Quantitative with Creat Ratio  - amLODIPine (NORVASC) 5 MG tablet  Dispense: 90 tablet; Refill: 3  - lisinopril (ZESTRIL) 20 MG tablet  Dispense: 90 tablet; Refill: 3  - PRIMARY CARE FOLLOW-UP SCHEDULING  - Echocardiogram Exercise Stress    Hyperlipidemia LDL goal <130    - Comprehensive metabolic panel  - Lipid panel reflex to direct LDL Fasting  - CK total  - REVIEW OF HEALTH MAINTENANCE PROTOCOL ORDERS  - Comprehensive metabolic panel  - Lipid panel reflex to direct LDL Fasting  - CK total  - PRIMARY CARE FOLLOW-UP SCHEDULING  - Echocardiogram Exercise Stress    Idiopathic chronic gout of multiple sites  without tophus    - Comprehensive metabolic panel  - Uric acid  - REVIEW OF HEALTH MAINTENANCE PROTOCOL ORDERS  - Comprehensive metabolic panel  - Uric acid  - allopurinol (ZYLOPRIM) 100 MG tablet  Dispense: 90 tablet; Refill: 3  - allopurinol (ZYLOPRIM) 300 MG tablet  Dispense: 90 tablet; Refill: 3  - PRIMARY CARE FOLLOW-UP SCHEDULING    Primary osteoarthritis of both knees    - Orthopedic  Referral  - PRIMARY CARE FOLLOW-UP SCHEDULING    Restless legs syndrome (RLS)    - Ferritin  - REVIEW OF HEALTH MAINTENANCE PROTOCOL ORDERS  - Ferritin  - rOPINIRole (REQUIP) 1 MG tablet  Dispense: 90 tablet; Refill: 3  - rOPINIRole (REQUIP) 3 MG tablet  Dispense: 90 tablet; Refill: 3  - PRIMARY CARE FOLLOW-UP SCHEDULING    History of colonic polyps    - Fecal colorectal cancer screen FIT  - REVIEW OF HEALTH MAINTENANCE PROTOCOL ORDERS  - Fecal colorectal cancer screen FIT  - PRIMARY CARE FOLLOW-UP SCHEDULING    Screening for prostate cancer    - Prostate Specific Antigen Screen  - REVIEW OF HEALTH MAINTENANCE PROTOCOL ORDERS  - Prostate Specific Antigen Screen  - PRIMARY CARE FOLLOW-UP SCHEDULING    Screen for colon cancer    - Fecal colorectal cancer screen FIT  - REVIEW OF HEALTH MAINTENANCE PROTOCOL ORDERS  - Fecal colorectal cancer screen FIT  - PRIMARY CARE FOLLOW-UP SCHEDULING    Medication monitoring encounter    - Comprehensive metabolic panel  - Lipid panel reflex to direct LDL Fasting  - CBC with platelets  - CK total  - UA Macroscopic with reflex to Microscopic and Culture  - Albumin Random Urine Quantitative with Creat Ratio  - TSH with free T4 reflex  - Uric acid  - Ferritin  - REVIEW OF HEALTH MAINTENANCE PROTOCOL ORDERS  - Comprehensive metabolic panel  - Lipid panel reflex to direct LDL Fasting  - CBC with platelets  - CK total  - UA Macroscopic with reflex to Microscopic and Culture  - Albumin Random Urine Quantitative with Creat Ratio  - TSH with free T4 reflex  - Uric acid  - Ferritin  - PRIMARY CARE  "FOLLOW-UP SCHEDULING    Patient has been advised of split billing requirements and indicates understanding: Yes    Prescription drug management    33 minutes spent by me on the date of the encounter doing chart review, history and exam, documentation and further activities per the note    BMI  Estimated body mass index is 33 kg/m  as calculated from the following:    Height as of this encounter: 1.88 m (6' 2\").    Weight as of this encounter: 116.6 kg (257 lb).   Weight management plan: diet and exercise    Counseling  Appropriate preventive services were discussed with this patient, including applicable screening as appropriate for fall prevention, nutrition, physical activity, Tobacco-use cessation, weight loss and cognition.  Checklist reviewing preventive services available has been given to the patient.  Reviewed patient's diet, addressing concerns and/or questions.   He is at risk for lack of exercise and has been provided with information to increase physical activity for the benefit of his well-being.     Work on weight loss  Regular exercise    Plan:    1) Medications: refilled, added requip 1 mg in am    2) Labs: pending    3) Immunizations: consider flu, covid, RSV    4) Imaging/Diagnostics: NA    5) Consults: Ortho - bilateral knees    6) desires stress echo / heart calcium    Subjective     Michael is a 64 year old, presenting for the following:    Physical        4/1/2024     2:08 PM   Additional Questions   Roomed by Joselin ALANIZ CMA        Health Care Directive  Patient has a Health Care Directive on file  Advance care planning document is on file and is current.    Hypertension Follow-up    Do you check your blood pressure regularly outside of the clinic? No   Are you following a low salt diet? Yes  Are your blood pressures ever more than 140 on the top number (systolic) OR more   than 90 on the bottom number (diastolic), for example 140/90? N/A    BP Readings from Last 3 Encounters:   04/01/24 126/86 "   03/29/23 122/80   12/27/22 138/84     Creatinine   Date Value Ref Range Status   03/29/2023 1.06 0.67 - 1.17 mg/dL Final   11/19/2020 1.06 0.66 - 1.25 mg/dL Final     GFR Estimate   Date Value Ref Range Status   03/29/2023 78 >60 mL/min/1.73m2 Final     Comment:     eGFR calculated using 2021 CKD-EPI equation.   11/19/2020 75 >60 mL/min/[1.73_m2] Final     Comment:     Non  GFR Calc  Starting 12/18/2018, serum creatinine based estimated GFR (eGFR) will be   calculated using the Chronic Kidney Disease Epidemiology Collaboration   (CKD-EPI) equation.       Lipids    Recent Labs   Lab Test 03/29/23  0821 01/19/22  1427   CHOL 229* 207*   HDL 51 58   * 127*   TRIG 173* 108     Gout - no recent flares    Uric Acid   Date Value Ref Range Status   03/29/2023 4.5 3.4 - 7.0 mg/dL Final   11/19/2020 5.3 3.5 - 7.2 mg/dL Final     RLS - every night, some during the day - requip helps    OA - desires injections    Patient Active Problem List   Diagnosis    Hypertension goal BP (blood pressure) < 140/90    Hyperlipidemia LDL goal <130    Idiopathic chronic gout of 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    Restless legs syndrome (RLS)    History of colonic polyps       Past Medical History:   Diagnosis Date    History of colonic polyps 01/2019    Hyperlipidemia LDL goal <130     Hypertension goal BP (blood pressure) < 140/90     Idiopathic chronic gout of 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     Restless legs syndrome (RLS)        Past Surgical History:   Procedure Laterality Date    APPENDECTOMY  2008    COLONOSCOPY  01/2019    colon polyp, due 5 yrs       Current Outpatient Medications   Medication Sig Dispense Refill    allopurinol (ZYLOPRIM) 100 MG tablet Take in addition to 300mg tablet, 400 mg every day. 90 tablet 3    allopurinol (ZYLOPRIM) 300 MG tablet Take 1 tablet (300 mg) by mouth daily 90 tablet 3    amLODIPine (NORVASC) 5 MG tablet Take 1 tablet (5 mg) by mouth daily 90 tablet 3    lisinopril (ZESTRIL) 20 MG  tablet Take 1 tablet (20 mg) by mouth daily 90 tablet 3    rOPINIRole (REQUIP) 1 MG tablet Take 1 tablet (1 mg) by mouth every morning 90 tablet 3    rOPINIRole (REQUIP) 3 MG tablet Take 1 tablet (3 mg) by mouth every evening 90 tablet 3       No Known Allergies    Family History   Problem Relation Age of Onset    Arthritis Mother     Heart Disease Father     Hypertension Father        Social History     Socioeconomic History    Marital status: Single     Spouse name: OBEY Villafuerte    Number of children: 2    Years of education: 14    Highest education level: None   Tobacco Use    Smoking status: Never    Smokeless tobacco: Never   Vaping Use    Vaping Use: Never used   Substance and Sexual Activity    Alcohol use: Yes     Alcohol/week: 5.0 standard drinks of alcohol     Types: 5 Standard drinks or equivalent per week    Drug use: Never    Sexual activity: Yes     Social Determinants of Health     Financial Resource Strain: Low Risk  (3/25/2024)    Financial Resource Strain     Within the past 12 months, have you or your family members you live with been unable to get utilities (heat, electricity) when it was really needed?: No   Food Insecurity: Low Risk  (3/25/2024)    Food Insecurity     Within the past 12 months, did you worry that your food would run out before you got money to buy more?: No     Within the past 12 months, did the food you bought just not last and you didn t have money to get more?: No   Transportation Needs: Low Risk  (3/25/2024)    Transportation Needs     Within the past 12 months, has lack of transportation kept you from medical appointments, getting your medicines, non-medical meetings or appointments, work, or from getting things that you need?: No   Physical Activity: Insufficiently Active (3/25/2024)    Exercise Vital Sign     Days of Exercise per Week: 3 days     Minutes of Exercise per Session: 30 min   Stress: Stress Concern Present (3/25/2024)    [employer] - Occupational Stress Questionnaire     Feeling of Stress : To some extent   Social Connections: Unknown (3/25/2024)    Social Connection and Isolation Panel [NHANES]     Frequency of Social Gatherings with Friends and Family: More than three times a week   Interpersonal Safety: Low Risk  (4/1/2024)    Interpersonal Safety     Do you feel physically and emotionally safe where you currently live?: Yes     Within the past 12 months, have you been hit, slapped, kicked or otherwise physically hurt by someone?: No     Within the past 12 months, have you been humiliated or emotionally abused in other ways by your partner or ex-partner?: No   Housing Stability: Low Risk  (3/25/2024)    Housing Stability     Do you have housing? : Yes     Are you worried about losing your housing?: No     Colonoscopy:  scheduled 4/2024  FIT / Cologuard: NA  PSA: pending        3/25/2024   General Health   How would you rate your overall physical health? Excellent   Feel stress (tense, anxious, or unable to sleep) To some extent   (!) STRESS CONCERN      3/25/2024   Nutrition   Diet: Regular (no restrictions)         3/25/2024   Exercise   Days per week of moderate/strenous exercise 3 days   Average minutes spent exercising at this level 30 min         3/25/2024   Social Factors   Frequency of gathering with friends or relatives More than three times a week   Worry food won't last until get money to buy more No   Food not last or not have enough money for food? No   Do you have housing?  Yes   Are you worried about losing your housing? No   Lack of transportation? No   Unable to get utilities (heat,electricity)? No         3/25/2024   Activities of Daily Living- Home Safety   Needs help with the following daily activites None of the above   Safety concerns in the home None of the above         3/25/2024   Dental   Dentist two times every year? Yes         3/25/2024   Hearing Screening   Hearing concerns? None of the above          3/25/2024   Driving Risk Screening   Patient/family members have concerns about driving No         3/25/2024   General Alertness/Fatigue Screening   Have you been more tired than usual lately? No         3/25/2024   Urinary Incontinence Screening   Bothered by leaking urine in past 6 months No         3/25/2024   TB Screening   Were you born outside of the [place]? No         Today's PHQ-2 Score:       4/1/2024     6:19 AM   PHQ-2 ( 1999 [employer])   Q1: Little interest or pleasure in doing things 0   Q2: Feeling down, depressed or hopeless 0   PHQ-2 Score 0   Q1: Little interest or pleasure in doing things Not at all   Q2: Feeling down, depressed or hopeless Not at all   PHQ-2 Score 0           3/25/2024   Substance Use   Alcohol more than 3/day or more than 7/wk No   Do you have a current opioid prescription? No   How severe/bad is pain from 1 to 10? 2/10   Do you use any other substances recreationally? (!) ALCOHOL     Social History     Tobacco Use    Smoking status: Never    Smokeless tobacco: Never   Vaping Use    Vaping Use: Never used   Substance Use Topics    Alcohol use: Yes     Alcohol/week: 5.0 standard drinks of alcohol     Types: 5 Standard drinks or equivalent per week    Drug use: Never           3/25/2024   AAA Screening   Family history of Abdominal Aortic Aneurysm (AAA)? No   Last PSA:   PSA   Date Value Ref Range Status   11/19/2020 0.49 0 - 4 ug/L Final     Comment:     Assay Method:  Chemiluminescence using Siemens Vista analyzer     Prostate Specific Antigen Screen   Date Value Ref Range Status   03/29/2023 0.46 0.00 - 4.50 ng/mL Final   01/19/2022 0.50 0.00 - 4.00 ug/L Final     ASCVD Risk   The 10-year ASCVD risk score (Binh LILLY, et al., 2019) is: 15.6%    Values used to calculate the score:      Age: 64 years      Sex: Male      Is Non- : No      Diabetic: No      Tobacco smoker: No      Systolic Blood Pressure: 126 mmHg      Is BP treated: Yes      HDL  Cholesterol: 51 mg/dL      Total Cholesterol: 229 mg/dL    Fracture Risk Assessment Tool  Link to Frax Calculator  Use the information below to complete the Frax calculator  : 1960  Sex: male  Weight (kg): 116.6 kg (actual weight)  Height (cm): 188 cm  Previous Fragility Fracture:  No  History of parent with fractured hip:  No  Current Smoking:  No  Patient has been on glucocorticoids for more than 3 months (5mg/day or more): No  Rheumatoid Arthritis on Problem List:  No  Secondary Osteoporosis on Problem List:  No  Consumes 3 or more units of alcohol per day: No  Femoral Neck BMD (g/cm2)            Reviewed and updated as needed this visit by Provider                  Current providers sharing in care for this patient include:  Patient Care Team:  Kal Briceño MD as PCP - General (Family Medicine)  Kal Briceño MD as Assigned PCP  Roe Keating MD as Assigned Musculoskeletal Provider    The following health maintenance items are reviewed in Epic and correct as of today:  Health Maintenance   Topic Date Due    RSV VACCINE (Pregnancy & 60+) (1 - 1-dose 60+ series) Never done    INFLUENZA VACCINE (1) 2023    COVID-19 Vaccine (5 - - season) 2023    LIPID  2024    MEDICARE ANNUAL WELLNESS VISIT  2025    ANNUAL REVIEW OF HM ORDERS  2025    FALL RISK ASSESSMENT  2025    GLUCOSE  2026    COLORECTAL CANCER SCREENING  2026    ADVANCE CARE PLANNING  2029    DTAP/TDAP/TD IMMUNIZATION (5 - Td or Tdap) 10/28/2029    PHQ-2 (once per calendar year)  Completed    Pneumococcal Vaccine: 65+ Years  Completed    ZOSTER IMMUNIZATION  Completed    HEPATITIS C SCREENING  Addressed    HIV SCREENING  Addressed    IPV IMMUNIZATION  Aged Out    HPV IMMUNIZATION  Aged Out    MENINGITIS IMMUNIZATION  Aged Out    RSV MONOCLONAL ANTIBODY  Aged Out       Review of Systems  CONSTITUTIONAL: NEGATIVE for fever, chills, change in weight  INTEGUMENTARY/SKIN: NEGATIVE for worrisome  "rashes, moles or lesions  EYES: NEGATIVE for vision changes or irritation  ENT/MOUTH: NEGATIVE for ear, mouth and throat problems  RESP: NEGATIVE for significant cough or SOB  CV: NEGATIVE for chest pain, palpitations or peripheral edema  GI: NEGATIVE for nausea, abdominal pain, heartburn, or change in bowel habits  : NEGATIVE for frequency, dysuria, or hematuria  MUSCULOSKELETAL: NEGATIVE for significant arthralgias or myalgia  NEURO: NEGATIVE for weakness, dizziness or paresthesias  ENDOCRINE: NEGATIVE for temperature intolerance, skin/hair changes  HEME: NEGATIVE for bleeding problems  PSYCHIATRIC: NEGATIVE for changes in mood or affect     Objective    Exam  /86   Pulse 80   Temp 97.7  F (36.5  C) (Tympanic)   Resp 16   Ht 1.88 m (6' 2\")   Wt 116.6 kg (257 lb)   SpO2 99%   BMI 33.00 kg/m     Estimated body mass index is 33 kg/m  as calculated from the following:    Height as of this encounter: 1.88 m (6' 2\").    Weight as of this encounter: 116.6 kg (257 lb).    Physical Exam  GENERAL: alert and no distress  EYES: Eyes grossly normal to inspection, PERRL and conjunctivae and sclerae normal  HENT: ear canals and TM's normal, nose and mouth without ulcers or lesions  NECK: no adenopathy, no asymmetry, masses, or scars  RESP: lungs clear to auscultation - no rales, rhonchi or wheezes  CV: regular rate and rhythm, normal S1 S2, no S3 or S4, no murmur, click or rub, no peripheral edema  ABDOMEN: soft, nontender, no hepatosplenomegaly, no masses and bowel sounds normal   (male): pt declines  RECTAL: pt declines  MS: no gross musculoskeletal defects noted, no edema  SKIN: no suspicious lesions or rashes  NEURO: Normal strength and tone, mentation intact and speech normal  PSYCH: mentation appears normal, affect normal/bright         4/1/2024   Mini Cog   Clock Draw Score 2 Normal   3 Item Recall 3 objects recalled   Mini Cog Total Score 5             Signed Electronically by: Kal Briceño MD    "

## 2024-04-03 ENCOUNTER — MYC MEDICAL ADVICE (OUTPATIENT)
Dept: FAMILY MEDICINE | Facility: CLINIC | Age: 64
End: 2024-04-03
Payer: COMMERCIAL

## 2024-04-15 ENCOUNTER — TELEPHONE (OUTPATIENT)
Dept: GASTROENTEROLOGY | Facility: CLINIC | Age: 64
End: 2024-04-15
Payer: COMMERCIAL

## 2024-04-16 ENCOUNTER — MYC MEDICAL ADVICE (OUTPATIENT)
Dept: FAMILY MEDICINE | Facility: CLINIC | Age: 64
End: 2024-04-16
Payer: COMMERCIAL

## 2024-04-16 NOTE — TELEPHONE ENCOUNTER
Pre assessment completed for upcoming procedure.   (Please see previous telephone encounter notes for complete details)    Patient  returned call.       Procedure details:    Arrival time and facility location reviewed.    Pre op exam needed? N/A    Designated  policy reviewed. Instructed to have someone stay 6  hours post procedure.       Medication review:    Medications reviewed. Please see supporting documentation below. Holding recommendations discussed (if applicable).   NSAID medication(s): Ibuprofen (Advil, Motrin): HOLD 1 day before procedure.      Prep for procedure:     Procedure prep instructions reviewed briefly. Reiterated 7 day dietary changes, magnesium citrate timing, and NPO time.      Any additional information needed:  N/A      Patient  verbalized understanding and had no questions or concerns at this time.      Didi Colon RN  Endoscopy Procedure Pre Assessment RN  [phone] option 4

## 2024-04-16 NOTE — TELEPHONE ENCOUNTER
Attempted to contact patient in order to complete pre assessment questions.     No answer. Left message to return call to [phone] option 4    Callback required communication sent via [employer].      Maine Vásquez RN  Endoscopy Procedure Pre Assessment RN

## 2024-04-16 NOTE — TELEPHONE ENCOUNTER
Pre visit planning completed.      Procedure details:    Patient scheduled for Colonoscopy  on 04.30.2024.     Arrival time: 0850. Procedure time 0935    Facility location: [facility]; [address] [place] [address]. Check in location: Main entrance at . Come through the roundabout underneath the awning (not the ER entrance).    Sedation type: Conscious sedation     Pre op exam needed? N/A    Indication for procedure:   History of colonic polyps   Screen for colon cancer         Chart review:     Electronic implanted devices? No    Recent diagnosis of diverticulitis within the last 6 weeks? No    Diabetic? No      Medication review:    Anticoagulants? No    NSAIDS? No NSAID medications per patient's medication list.  RN will verify with pre-assessment call.    Other medication HOLDING recommendations:  N/A      Prep for procedure:     Bowel prep recommendation: Standard Miralax  Due to: standard bowel prep.    Prep instructions sent via [employer]         Maine Vásquez RN  Endoscopy Procedure Pre Assessment RN  [phone] option 4

## 2024-04-30 ENCOUNTER — HOSPITAL ENCOUNTER (OUTPATIENT)
Facility: CLINIC | Age: 64
Discharge: HOME | End: 2024-04-30
Attending: COLON & RECTAL SURGERY | Admitting: COLON & RECTAL SURGERY
Payer: COMMERCIAL

## 2024-04-30 VITALS
RESPIRATION RATE: 16 BRPM | HEIGHT: 74 IN | SYSTOLIC BLOOD PRESSURE: 137 MMHG | WEIGHT: 254 LBS | BODY MASS INDEX: 32.6 KG/M2 | OXYGEN SATURATION: 98 % | DIASTOLIC BLOOD PRESSURE: 88 MMHG | HEART RATE: 82 BPM

## 2024-04-30 LAB — COLONOSCOPY: NORMAL

## 2024-04-30 PROCEDURE — G0500 MOD SEDAT ENDO SERVICE >5YRS: HCPCS | Performed by: COLON & RECTAL SURGERY

## 2024-04-30 PROCEDURE — 250N000011 HC RX IP 250 OP 636: Performed by: COLON & RECTAL SURGERY

## 2024-04-30 PROCEDURE — 88305 TISSUE EXAM BY PATHOLOGIST: CPT | Mod: 26

## 2024-04-30 PROCEDURE — 88305 TISSUE EXAM BY PATHOLOGIST: CPT | Mod: TC | Performed by: COLON & RECTAL SURGERY

## 2024-04-30 PROCEDURE — 45380 COLONOSCOPY AND BIOPSY: CPT | Mod: PT | Performed by: COLON & RECTAL SURGERY

## 2024-04-30 PROCEDURE — 99153 MOD SED SAME PHYS/QHP EA: CPT | Performed by: COLON & RECTAL SURGERY

## 2024-04-30 RX ORDER — FLUMAZENIL 0.1 MG/ML
0.2 INJECTION, SOLUTION INTRAVENOUS
Status: DISCONTINUED | OUTPATIENT
Start: 2024-04-30 | End: 2024-04-30 | Stop reason: HOSPADM

## 2024-04-30 RX ORDER — ATROPINE SULFATE 0.1 MG/ML
1 INJECTION INTRAVENOUS
Status: DISCONTINUED | OUTPATIENT
Start: 2024-04-30 | End: 2024-04-30 | Stop reason: HOSPADM

## 2024-04-30 RX ORDER — LIDOCAINE 40 MG/G
CREAM TOPICAL
Status: DISCONTINUED | OUTPATIENT
Start: 2024-04-30 | End: 2024-04-30 | Stop reason: HOSPADM

## 2024-04-30 RX ORDER — EPINEPHRINE 1 MG/ML
0.1 INJECTION, SOLUTION INTRAMUSCULAR; SUBCUTANEOUS
Status: DISCONTINUED | OUTPATIENT
Start: 2024-04-30 | End: 2024-04-30 | Stop reason: HOSPADM

## 2024-04-30 RX ORDER — ONDANSETRON 2 MG/ML
4 INJECTION INTRAMUSCULAR; INTRAVENOUS EVERY 6 HOURS PRN
Status: DISCONTINUED | OUTPATIENT
Start: 2024-04-30 | End: 2024-04-30 | Stop reason: HOSPADM

## 2024-04-30 RX ORDER — SIMETHICONE 40MG/0.6ML
133 SUSPENSION, DROPS(FINAL DOSAGE FORM)(ML) ORAL
Status: DISCONTINUED | OUTPATIENT
Start: 2024-04-30 | End: 2024-04-30 | Stop reason: HOSPADM

## 2024-04-30 RX ORDER — NALOXONE HYDROCHLORIDE 0.4 MG/ML
0.2 INJECTION, SOLUTION INTRAMUSCULAR; INTRAVENOUS; SUBCUTANEOUS
Status: DISCONTINUED | OUTPATIENT
Start: 2024-04-30 | End: 2024-04-30 | Stop reason: HOSPADM

## 2024-04-30 RX ORDER — ONDANSETRON 4 MG/1
4 TABLET, ORALLY DISINTEGRATING ORAL EVERY 6 HOURS PRN
Status: DISCONTINUED | OUTPATIENT
Start: 2024-04-30 | End: 2024-04-30 | Stop reason: HOSPADM

## 2024-04-30 RX ORDER — FENTANYL CITRATE 50 UG/ML
50-100 INJECTION, SOLUTION INTRAMUSCULAR; INTRAVENOUS EVERY 5 MIN PRN
Status: DISCONTINUED | OUTPATIENT
Start: 2024-04-30 | End: 2024-04-30 | Stop reason: HOSPADM

## 2024-04-30 RX ORDER — NALOXONE HYDROCHLORIDE 0.4 MG/ML
0.4 INJECTION, SOLUTION INTRAMUSCULAR; INTRAVENOUS; SUBCUTANEOUS
Status: DISCONTINUED | OUTPATIENT
Start: 2024-04-30 | End: 2024-04-30 | Stop reason: HOSPADM

## 2024-04-30 RX ORDER — ONDANSETRON 2 MG/ML
4 INJECTION INTRAMUSCULAR; INTRAVENOUS
Status: DISCONTINUED | OUTPATIENT
Start: 2024-04-30 | End: 2024-04-30 | Stop reason: HOSPADM

## 2024-04-30 RX ORDER — PROCHLORPERAZINE MALEATE 5 MG
5 TABLET ORAL EVERY 6 HOURS PRN
Status: DISCONTINUED | OUTPATIENT
Start: 2024-04-30 | End: 2024-04-30 | Stop reason: HOSPADM

## 2024-04-30 RX ORDER — DIPHENHYDRAMINE HYDROCHLORIDE 50 MG/ML
25-50 INJECTION INTRAMUSCULAR; INTRAVENOUS
Status: DISCONTINUED | OUTPATIENT
Start: 2024-04-30 | End: 2024-04-30 | Stop reason: HOSPADM

## 2024-04-30 RX ADMIN — FENTANYL CITRATE 100 MCG: 50 INJECTION, SOLUTION INTRAMUSCULAR; INTRAVENOUS at 10:07

## 2024-04-30 RX ADMIN — MIDAZOLAM 3 MG: 1 INJECTION INTRAMUSCULAR; INTRAVENOUS at 10:07

## 2024-04-30 ASSESSMENT — ACTIVITIES OF DAILY LIVING (ADL)
ADLS_ACUITY_SCORE: 35

## 2024-04-30 NOTE — H&P
Pre-Endoscopy History and Physical     Skip Conner MRN# [id number]   YOB: 1960 Age: 64 year old     Date of Procedure: 4/30/2024  Primary care provider: Kal Briceño  Type of Endoscopy: Colonoscopy  Reason for Procedure: h/o polyps  Type of Anesthesia Anticipated: Moderate Sedation    HPI:    Skip is a 64 year old male who will be undergoing the above procedure.      A history and physical has been performed. The patient's medications and allergies have been reviewed. The risks and benefits of the procedure and the sedation options and risks were discussed with the patient.  All questions were answered and informed consent was obtained.      He denies a personal or family history of anesthesia complications or bleeding disorders.     No Known Allergies     Current Facility-Administered Medications   Medication Dose Route Frequency Provider Last Rate Last Admin    atropine injection 1 mg  1 mg Intravenous Once PRN Alison Hinojosa MD        benzocaine 20% (HURRICAINE/TOPEX) 20 % spray 0.5 mL  1 spray Mouth/Throat Once PRN Alison Hinojosa MD        diphenhydrAMINE (BENADRYL) injection 25-50 mg  25-50 mg Intravenous Once PRN Alison Hinojosa MD        EPINEPHrine (Anaphylaxis) (ADRENALIN) injection (vial) 0.1 mg  0.1 mg Submucosal Once PRN Alison Hinojosa MD        fentaNYL (PF) (SUBLIMAZE) injection  mcg   mcg Intravenous Q5 Min PRN Alison Hinojosa MD        flumazenil (ROMAZICON) injection 0.2 mg  0.2 mg Intravenous q1 min prn Alison Hinojosa MD        glucagon injection 0.5 mg  0.5 mg Intravenous Once PRN Alison Hinojosa MD        midazolam (VERSED) injection 0.5-2 mg  0.5-2 mg Intravenous Q4 Min PRN Alison Hinojosa MD        naloxone (NARCAN) injection 0.2 mg  0.2 mg Intravenous Q2 Min PRN Alison Hinojosa MD        Or    naloxone (NARCAN) injection 0.4 mg  0.4 mg Intravenous Q2 Min PRN Alison Hinojosa MD        Or    naloxone  "(NARCAN) injection 0.2 mg  0.2 mg Intramuscular Q2 Min PRN Alison Hinojosa MD        Or    naloxone (NARCAN) injection 0.4 mg  0.4 mg Intramuscular Q2 Min PRN Alison Hinojosa MD        simethicone (MYLICON) suspension 133 mg  133 mg Oral Once PRN Alison Hinojosa MD        sodium chloride (PF) 0.9% PF flush 3 mL  3 mL Intravenous q1 min prn Alison Hinojosa MD        sodium chloride 0.9% BOLUS 500 mL  500 mL Intravenous Once PRN Alison Hinojosa MD           Patient Active Problem List   Diagnosis    Hypertension goal BP (blood pressure) < 140/90    Hyperlipidemia LDL goal <130    Idiopathic chronic gout of 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    Restless legs syndrome (RLS)    History of colonic polyps        Past Medical History:   Diagnosis Date    History of colonic polyps 01/2019    Hyperlipidemia LDL goal <130     Hypertension goal BP (blood pressure) < 140/90     Idiopathic chronic gout of 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     Restless legs syndrome (RLS)         Past Surgical History:   Procedure Laterality Date    APPENDECTOMY  2008    COLONOSCOPY  01/2019    colon polyp, due 5 yrs       Social History     Tobacco Use    Smoking status: Never    Smokeless tobacco: Never   Substance Use Topics    Alcohol use: Yes     Alcohol/week: 5.0 standard drinks of alcohol     Types: 5 Standard drinks or equivalent per week       Family History   Problem Relation Age of Onset    Arthritis Mother     Heart Disease Father     Hypertension Father     Colon Cancer No family hx of        REVIEW OF SYSTEMS:     5 point ROS negative except as noted above in HPI, including Gen., Resp., CV, GI &  system review.      PHYSICAL EXAM:   Ht 1.88 m (6' 2\")   Wt 115.2 kg (254 lb)   BMI 32.61 kg/m   Estimated body mass index is 32.61 kg/m  as calculated from the following:    Height as of this encounter: 1.88 m (6' 2\").    Weight as of this encounter: 115.2 kg (254 lb).   GENERAL APPEARANCE: healthy and alert  MENTAL " STATUS: alert  AIRWAY EXAM: Mallampatti Class I (visualization of the soft palate, fauces, uvula, anterior and posterior pillars)  RESP: lungs clear to auscultation - no rales, rhonchi or wheezes  CV: regular rates and rhythm      IMPRESSION   ASA Class 2 - Mild systemic disease        PLAN:     Plan for colonoscopy. We discussed the risks, benefits and alternatives and the patient wished to proceed.    The above has been forwarded to the consulting provider.      Liane Hinojosa MD  [facility]  Phone: [phone]  Fax: [phone]  April 30, 2024

## 2024-05-02 LAB
PATH REPORT.COMMENTS IMP SPEC: NORMAL
PATH REPORT.COMMENTS IMP SPEC: NORMAL
PATH REPORT.FINAL DX SPEC: NORMAL
PATH REPORT.GROSS SPEC: NORMAL
PATH REPORT.MICROSCOPIC SPEC OTHER STN: NORMAL
PATH REPORT.RELEVANT HX SPEC: NORMAL
PHOTO IMAGE: NORMAL

## 2024-05-08 ENCOUNTER — TRANSFERRED RECORDS (OUTPATIENT)
Dept: HEALTH INFORMATION MANAGEMENT | Facility: CLINIC | Age: 64
End: 2024-05-08
Payer: COMMERCIAL

## 2024-05-30 NOTE — PROGRESS NOTES
ASSESSMENT & PLAN    Michael was seen today for follow up and follow up.    Diagnoses and all orders for this visit:    Osteoarthritis of patellofemoral joints of both knees  -     Orthopedic  Referral  -     XR Knee Bilateral 3 vw; Future  -     Physical Therapy  Referral; Future  -     (PRE-AUTH REQUEST) 48 mg hylan (SYNVISC ONE) injection 48 mg/6mL-ONCE      This issue is acute on chronic and Unchanged.  Michael presents our clinic today to discuss his acute on chronic, worsening bilateral knee pain.  History, exam findings, and imaging findings are consistent with his radiographically severe patellofemoral compartment osteoarthritis as the main  of his symptoms.  The patient has previously had corticosteroid and hyaluronic acid injections to his knees, with minimal relief with CSI but good relief for several years with hyaluronic acid injections, and is reasonable to proceed with repeat HA injections.  In addition, we discussed the utility of physical therapy to help strengthen and support the knee and optimize the longevity of his native joint, the patient was amenable to proceeding with this today as well.  We determined the following plan:  - Prior authorization for bilateral HA injections performed today  - Physical therapy referral placed today  - He can continue to use over-the-counter pain medicines, ice, and heat as needed  - He can follow-up in our clinic in 2 weeks for procedure only visit for bilateral TURNER injections    DO SHEL[facility] [place]    SUBJECTIVE- Interim History May 30, 2024    Chief Complaint   Patient presents with    Left Knee - Follow Up    Right Knee - Follow Up       Skip Conner is a 64 year old male who is seen in f/u up for bilateral knee pain. Since last visit on 10/11/22 with Dr Keating patient had decreased pain for 1 year.  - Now ~ 5 years from initial onset    Worsened by: going from sit to stand  Better with:  "ibuprofen, knee brace  Treatments tried: corticosteroid injection (most recent date: 7/19/22) that provided 3 week(s) of relief and viscosupplementation injection (most recent date: 10/11/22)  Associated symptoms:  no distal numbness or tingling; denies swelling or warmth    The patient is seen by themselves.    Orthopedic/Surgical history: NO  Social History/Occupation: health and       REVIEW OF SYSTEMS:  Review of Systems    OBJECTIVE:  BP (!) 171/107   Ht 1.88 m (6' 2\")   Wt 118.4 kg (261 lb)   BMI 33.51 kg/m     General: healthy, alert and in no distress  Skin: no suspicious lesions or rash.  CV: distal perfusion intact   Resp: normal respiratory effort without conversational dyspnea   Psych: normal mood and affect  Gait: NORMAL  Neuro: Normal light sensory exam of bilateral lower extremity     B/l Knee exam  Gait: Normal  Alignment:  [x] Normal  [] Anatomic valgus  [] Anatomic varus  Inspection: [x] Normal  [] Ecchymosis present []Other   Palpation:  Joint Tenderness: [x] No Tenderness  [] MJL [] LJL [] MCL Margin [] LCL Margin [] Pes Anserine [] Distal Quadricep  [] Other   Peripatellar Tenderness: [] None [x] Lateral pole [x] Medial pole [x] Superior pole [x] Inferior pole    Patellar tendon pain: [] None [x] Present    Patellar apprehension: [x] None [] Present    Patellar motion: [] Normal [] Abnormal  Crepitus: [] None [x] Present  Effusion: [x] None [] Trace [] 1+ [] 2+ [] 3+  Range of motion:  Flexion: 135, Extension: 0  Strength:  [x] Full in all planes, including intact extensor mechanism [] Limited as described  Neurologic: Normal sensation   Vascular: Normal pulses   Special tests:   Lachman: Negative  Anterior Drawer: Negative  Sag/quad Activation: NP  Posterior Drawer: Negative  Valgus Stress: Negative at 0/30  Varus Stress: Negative at 0/30  Corrine's: Negative  Thessaly: NP     Other notable findings/comments: None      RADIOLOGY:  Final results and radiologist's " interpretation, available in the [facility] health record.  Images were reviewed with the patient in the office today.  My personal interpretation of the performed imaging: Severe joint space narrowing and osteophytosis of the patellofemoral compartment bilaterally.  Otherwise well-maintained joint spaces.

## 2024-06-03 ENCOUNTER — ANCILLARY PROCEDURE (OUTPATIENT)
Dept: GENERAL RADIOLOGY | Facility: CLINIC | Age: 64
End: 2024-06-03
Attending: STUDENT IN AN ORGANIZED HEALTH CARE EDUCATION/TRAINING PROGRAM
Payer: COMMERCIAL

## 2024-06-03 ENCOUNTER — OFFICE VISIT (OUTPATIENT)
Dept: ORTHOPEDICS | Facility: CLINIC | Age: 64
End: 2024-06-03
Payer: COMMERCIAL

## 2024-06-03 VITALS
BODY MASS INDEX: 33.5 KG/M2 | SYSTOLIC BLOOD PRESSURE: 171 MMHG | DIASTOLIC BLOOD PRESSURE: 107 MMHG | HEIGHT: 74 IN | WEIGHT: 261 LBS

## 2024-06-03 DIAGNOSIS — M17.0 PRIMARY OSTEOARTHRITIS OF BOTH KNEES: ICD-10-CM

## 2024-06-03 DIAGNOSIS — M17.0 OSTEOARTHRITIS OF PATELLOFEMORAL JOINTS OF BOTH KNEES: Primary | ICD-10-CM

## 2024-06-03 PROCEDURE — 99204 OFFICE O/P NEW MOD 45 MIN: CPT | Performed by: STUDENT IN AN ORGANIZED HEALTH CARE EDUCATION/TRAINING PROGRAM

## 2024-06-03 PROCEDURE — 73562 X-RAY EXAM OF KNEE 3: CPT | Mod: TC | Performed by: RADIOLOGY

## 2024-06-03 NOTE — LETTER
6/3/2024         RE: Skip Conner  [id number]  [place] [address]        Dear Colleague,    Thank you for referring your patient, Skip Conner, to the [facility]. Please see a copy of my visit note below.    ASSESSMENT & PLAN    Michael was seen today for follow up and follow up.    Diagnoses and all orders for this visit:    Osteoarthritis of patellofemoral joints of both knees  -     Orthopedic  Referral  -     XR Knee Bilateral 3 vw; Future  -     Physical Therapy  Referral; Future  -     (PRE-AUTH REQUEST) 48 mg hylan (SYNVISC ONE) injection 48 mg/6mL-ONCE      This issue is acute on chronic and Unchanged.  Michael presents our clinic today to discuss his acute on chronic, worsening bilateral knee pain.  History, exam findings, and imaging findings are consistent with his radiographically severe patellofemoral compartment osteoarthritis as the main  of his symptoms.  The patient has previously had corticosteroid and hyaluronic acid injections to his knees, with minimal relief with CSI but good relief for several years with hyaluronic acid injections, and is reasonable to proceed with repeat HA injections.  In addition, we discussed the utility of physical therapy to help strengthen and support the knee and optimize the longevity of his native joint, the patient was amenable to proceeding with this today as well.  We determined the following plan:  - Prior authorization for bilateral HA injections performed today  - Physical therapy referral placed today  - He can continue to use over-the-counter pain medicines, ice, and heat as needed  - He can follow-up in our clinic in 2 weeks for procedure only visit for bilateral TURNER injections    Sean Kenney,   [facility]    SUBJECTIVE- Interim History May 30, 2024    Chief Complaint   Patient presents with     Left Knee - Follow Up     Right Knee -  "Follow Up       Skip Conner is a 64 year old male who is seen in f/u up for bilateral knee pain. Since last visit on 10/11/22 with Dr Keating patient had decreased pain for 1 year.  - Now ~ 5 years from initial onset    Worsened by: going from sit to stand  Better with: ibuprofen, knee brace  Treatments tried: corticosteroid injection (most recent date: 7/19/22) that provided 3 week(s) of relief and viscosupplementation injection (most recent date: 10/11/22)  Associated symptoms:  no distal numbness or tingling; denies swelling or warmth    The patient is seen by themselves.    Orthopedic/Surgical history: NO  Social History/Occupation: health and       REVIEW OF SYSTEMS:  Review of Systems    OBJECTIVE:  BP (!) 171/107   Ht 1.88 m (6' 2\")   Wt 118.4 kg (261 lb)   BMI 33.51 kg/m     General: healthy, alert and in no distress  Skin: no suspicious lesions or rash.  CV: distal perfusion intact   Resp: normal respiratory effort without conversational dyspnea   Psych: normal mood and affect  Gait: NORMAL  Neuro: Normal light sensory exam of bilateral lower extremity     B/l Knee exam  Gait: Normal  Alignment:  [x] Normal  [] Anatomic valgus  [] Anatomic varus  Inspection: [x] Normal  [] Ecchymosis present []Other   Palpation:  Joint Tenderness: [x] No Tenderness  [] MJL [] LJL [] MCL Margin [] LCL Margin [] Pes Anserine [] Distal Quadricep  [] Other   Peripatellar Tenderness: [] None [x] Lateral pole [x] Medial pole [x] Superior pole [x] Inferior pole    Patellar tendon pain: [] None [x] Present    Patellar apprehension: [x] None [] Present    Patellar motion: [] Normal [] Abnormal  Crepitus: [] None [x] Present  Effusion: [x] None [] Trace [] 1+ [] 2+ [] 3+  Range of motion:  Flexion: 135, Extension: 0  Strength:  [x] Full in all planes, including intact extensor mechanism [] Limited as described  Neurologic: Normal sensation   Vascular: Normal pulses   Special tests:   Lachman: Negative  Anterior " Drawer: Negative  Sag/quad Activation: NP  Posterior Drawer: Negative  Valgus Stress: Negative at 0/30  Varus Stress: Negative at 0/30  Corrine's: Negative  Thessaly: NP     Other notable findings/comments: None      RADIOLOGY:  Final results and radiologist's interpretation, available in the [facility] health record.  Images were reviewed with the patient in the office today.  My personal interpretation of the performed imaging: Severe joint space narrowing and osteophytosis of the patellofemoral compartment bilaterally.  Otherwise well-maintained joint spaces.        Again, thank you for allowing me to participate in the care of your patient.        Sincerely,        Sean Kenney, DO

## 2024-06-05 ENCOUNTER — ANCILLARY PROCEDURE (OUTPATIENT)
Dept: GENERAL RADIOLOGY | Facility: CLINIC | Age: 64
End: 2024-06-05
Attending: FAMILY MEDICINE
Payer: COMMERCIAL

## 2024-06-05 ENCOUNTER — OFFICE VISIT (OUTPATIENT)
Dept: FAMILY MEDICINE | Facility: CLINIC | Age: 64
End: 2024-06-05
Payer: COMMERCIAL

## 2024-06-05 VITALS
OXYGEN SATURATION: 99 % | BODY MASS INDEX: 33.86 KG/M2 | HEIGHT: 74 IN | RESPIRATION RATE: 16 BRPM | SYSTOLIC BLOOD PRESSURE: 132 MMHG | HEART RATE: 84 BPM | DIASTOLIC BLOOD PRESSURE: 84 MMHG | TEMPERATURE: 98.8 F | WEIGHT: 263.8 LBS

## 2024-06-05 DIAGNOSIS — G89.29 CHRONIC LEFT-SIDED LOW BACK PAIN WITHOUT SCIATICA: ICD-10-CM

## 2024-06-05 DIAGNOSIS — Z51.81 MEDICATION MONITORING ENCOUNTER: ICD-10-CM

## 2024-06-05 DIAGNOSIS — M54.50 CHRONIC LEFT-SIDED LOW BACK PAIN WITHOUT SCIATICA: ICD-10-CM

## 2024-06-05 DIAGNOSIS — I10 HYPERTENSION GOAL BP (BLOOD PRESSURE) < 140/90: ICD-10-CM

## 2024-06-05 DIAGNOSIS — M54.50 CHRONIC LEFT-SIDED LOW BACK PAIN WITHOUT SCIATICA: Primary | ICD-10-CM

## 2024-06-05 DIAGNOSIS — G89.29 CHRONIC LEFT-SIDED LOW BACK PAIN WITHOUT SCIATICA: Primary | ICD-10-CM

## 2024-06-05 PROCEDURE — 99213 OFFICE O/P EST LOW 20 MIN: CPT | Performed by: FAMILY MEDICINE

## 2024-06-05 PROCEDURE — G2211 COMPLEX E/M VISIT ADD ON: HCPCS | Performed by: FAMILY MEDICINE

## 2024-06-05 PROCEDURE — 72100 X-RAY EXAM L-S SPINE 2/3 VWS: CPT | Mod: TC | Performed by: RADIOLOGY

## 2024-06-05 ASSESSMENT — ENCOUNTER SYMPTOMS: BACK PAIN: 1

## 2024-06-05 NOTE — PROGRESS NOTES
Assessment & Plan     Chronic left-sided low back pain without sciatica    - XR Lumbar Spine 2/3 Views  - Physical Therapy  Referral    Hypertension goal BP (blood pressure) < 140/90      Medication monitoring encounter            Work on weight loss  Regular exercise    Plan:    1) Medications: heat / ice / tylenol arthritis / salonpas    2) Labs: NA    3) Immunizations: NA    4) Imaging/Diagnostics: lumbar xrays pending    5) Consults: FSOC - Synvisc injections pending - bilateral knees - PT    6) consider FSOC / spine / MRI lumbar spine    21 minutes spent by myself on the date of the encounter doing chart review, history and exam, documentation and further activities per the note.    Pushpa Rodriguez is a 64 year old, presenting for the following health issues:  Back Pain        6/5/2024     1:48 PM   Additional Questions   Roomed by Latoya     History of Present Illness       Back Pain:  He presents for follow up of back pain. Patient's back pain is a chronic problem.  Location of back pain:  Left lower back  Description of back pain: dull ache  Back pain spreads: nowhere    Since patient first noticed back pain, pain is: unchanged  Does back pain interfere with his job:  No       He eats 0-1 servings of fruits and vegetables daily.He consumes 1 sweetened beverage(s) daily.He exercises with enough effort to increase his heart rate 9 or less minutes per day.  He exercises with enough effort to increase his heart rate 3 or less days per week. He is missing 7 dose(s) of medications per week.     Last 2 years - left sided lower back pain - no radiation - no incontinence - no known injuries / trauma - always there - difficult to get right leg of pants on - able to bend over - feels like a tight knot - not necessarily pain - stiff - better with massager temporarily - worse with activity - no issues with sleep    Patient Active Problem List   Diagnosis    Hypertension goal BP (blood pressure) < 140/90     Hyperlipidemia LDL goal <130    Idiopathic chronic gout of 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    Restless legs syndrome (RLS)    History of colonic polyps       Past Medical History:   Diagnosis Date    History of colonic polyps 01/2019 4/2024 - 3 mm polyp - hyperplastic - due 5 yrs    Hyperlipidemia LDL goal <130     Hypertension goal BP (blood pressure) < 140/90     Idiopathic chronic gout of 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     Restless legs syndrome (RLS)        Past Surgical History:   Procedure Laterality Date    APPENDECTOMY  2008    COLONOSCOPY  01/2019    colon polyp, due 5 yrs    COLONOSCOPY N/A 04/30/2024    3 mm hyperplastic polyp - due 5 yrs       Current Outpatient Medications   Medication Sig Dispense Refill    allopurinol (ZYLOPRIM) 100 MG tablet Take in addition to 300mg tablet, 400 mg every day. 90 tablet 3    allopurinol (ZYLOPRIM) 300 MG tablet Take 1 tablet (300 mg) by mouth daily 90 tablet 3    amLODIPine (NORVASC) 5 MG tablet Take 1 tablet (5 mg) by mouth daily 90 tablet 3    lisinopril (ZESTRIL) 20 MG tablet Take 1 tablet (20 mg) by mouth daily 90 tablet 3    rOPINIRole (REQUIP) 1 MG tablet Take 1 tablet (1 mg) by mouth every morning 90 tablet 3    rOPINIRole (REQUIP) 3 MG tablet Take 1 tablet (3 mg) by mouth every evening 90 tablet 3       No Known Allergies    Family History   Problem Relation Age of Onset    Arthritis Mother     Heart Disease Father     Hypertension Father     Colon Cancer No family hx of        Social History     Socioeconomic History    Marital status: Single     Spouse name: OBEY Villafuerte    Number of children: 2    Years of education: 14    Highest education level: None   Tobacco Use    Smoking status: Never    Smokeless tobacco: Never   Vaping Use    Vaping status: Never Used   Substance and Sexual Activity    Alcohol use: Yes     Alcohol/week: 5.0 standard drinks of alcohol     Types: 5 Standard drinks or equivalent per week    Drug use: Never    Sexual activity: Yes      Social Determinants of Health     Financial Resource Strain: Low Risk  (3/25/2024)    Financial Resource Strain     Within the past 12 months, have you or your family members you live with been unable to get utilities (heat, electricity) when it was really needed?: No   Food Insecurity: Low Risk  (3/25/2024)    Food Insecurity     Within the past 12 months, did you worry that your food would run out before you got money to buy more?: No     Within the past 12 months, did the food you bought just not last and you didn t have money to get more?: No   Transportation Needs: Low Risk  (3/25/2024)    Transportation Needs     Within the past 12 months, has lack of transportation kept you from medical appointments, getting your medicines, non-medical meetings or appointments, work, or from getting things that you need?: No   Physical Activity: Insufficiently Active (3/25/2024)    Exercise Vital Sign     Days of Exercise per Week: 3 days     Minutes of Exercise per Session: 30 min   Stress: Stress Concern Present (3/25/2024)    [employer] - Occupational Stress Questionnaire     Feeling of Stress : To some extent   Social Connections: Unknown (3/25/2024)    Social Connection and Isolation Panel [NHANES]     Frequency of Social Gatherings with Friends and Family: More than three times a week   Interpersonal Safety: Low Risk  (4/1/2024)    Interpersonal Safety     Do you feel physically and emotionally safe where you currently live?: Yes     Within the past 12 months, have you been hit, slapped, kicked or otherwise physically hurt by someone?: No     Within the past 12 months, have you been humiliated or emotionally abused in other ways by your partner or ex-partner?: No   Housing Stability: Low Risk  (3/25/2024)    Housing Stability     Do you have housing? : Yes     Are you worried about losing your housing?: No         Review of Systems  CONSTITUTIONAL: NEGATIVE for fever, chills, change in  "weight  INTEGUMENTARY/SKIN: NEGATIVE for worrisome rashes, moles or lesions  EYES: NEGATIVE for vision changes or irritation  ENT/MOUTH: NEGATIVE for ear, mouth and throat problems  RESP: NEGATIVE for significant cough or SOB  CV: NEGATIVE for chest pain, palpitations or peripheral edema  GI: NEGATIVE for nausea, abdominal pain, heartburn, or change in bowel habits  : NEGATIVE for frequency, dysuria, or hematuria  MUSCULOSKELETAL: NEGATIVE for significant arthralgias or myalgia  NEURO: NEGATIVE for weakness, dizziness or paresthesias  ENDOCRINE: NEGATIVE for temperature intolerance, skin/hair changes  HEME: NEGATIVE for bleeding problems  PSYCHIATRIC: NEGATIVE for changes in mood or affect      Objective    /84   Pulse 84   Temp 98.8  F (37.1  C) (Oral)   Resp 16   Ht 1.88 m (6' 2\")   Wt 119.7 kg (263 lb 12.8 oz)   SpO2 99%   BMI 33.87 kg/m    Body mass index is 33.87 kg/m .  Physical Exam   GENERAL: alert and no distress  EYES: Eyes grossly normal to inspection, PERRL and conjunctivae and sclerae normal  HENT: ear canals and TM's normal, nose and mouth without ulcers or lesions  NECK: no adenopathy, no asymmetry, masses, or scars  RESP: lungs clear to auscultation - no rales, rhonchi or wheezes  CV: regular rate and rhythm, normal S1 S2, no S3 or S4, no murmur, click or rub, no peripheral edema  ABDOMEN: soft, nontender, no hepatosplenomegaly, no masses and bowel sounds normal  MS: no gross musculoskeletal defects noted, no edema, normal ROM low back, normal knee reflex's, decreased ankle reflex's bilaterally  SKIN: no suspicious lesions or rashes  NEURO: Normal strength and tone, mentation intact and speech normal  PSYCH: mentation appears normal, affect normal/bright    Xrays pending      Signed Electronically by:              Kal Briceño MD, FAAFP     [facility]  [address] [place] [id number]  [email or web address]  " [email or web address]   Office: [phone]  Fax: [phone]

## 2024-06-12 ENCOUNTER — THERAPY VISIT (OUTPATIENT)
Dept: PHYSICAL THERAPY | Facility: CLINIC | Age: 64
End: 2024-06-12
Attending: FAMILY MEDICINE
Payer: COMMERCIAL

## 2024-06-12 DIAGNOSIS — M54.50 CHRONIC LEFT-SIDED LOW BACK PAIN WITHOUT SCIATICA: ICD-10-CM

## 2024-06-12 DIAGNOSIS — G89.29 CHRONIC LEFT-SIDED LOW BACK PAIN WITHOUT SCIATICA: ICD-10-CM

## 2024-06-12 PROCEDURE — 97161 PT EVAL LOW COMPLEX 20 MIN: CPT | Mod: GP | Performed by: PHYSICAL THERAPIST

## 2024-06-12 PROCEDURE — 97110 THERAPEUTIC EXERCISES: CPT | Mod: GP | Performed by: PHYSICAL THERAPIST

## 2024-06-12 NOTE — PROGRESS NOTES
PHYSICAL THERAPY EVALUATION  Type of Visit: Evaluation    See electronic medical record for Abuse and Falls Screening details.    Subjective       Presenting condition or subjective complaint: my lower left back. Ongoing ~ 1 year, just hasn't gone away. WORSE with donning R leg into pants, after prolonged sitting very stiff getting up. Denies any LE sxs. X-rays negative. BETTER with ibuprofen/Tylenol, massage device. Pain varies but 3-5/10 at rest, weakness with getting leg in pants.   Date of onset:      Relevant medical history: High blood pressure   Dates & types of surgery: none    Prior diagnostic imaging/testing results: X-ray     Prior therapy history for the same diagnosis, illness or injury: No        Living Environment  Social support: With a significant other or spouse   Type of home: House   Stairs to enter the home: Yes 6 Is there a railing: Yes   Ramp: No   Stairs inside the home: Yes 6 Is there a railing: Yes   Help at home: None  Equipment owned:       Employment: Yes Health and   Hobbies/Interests: yard work, boating, camping, golf    Patient goals for therapy: be pain free     Objective   LUMBAR SPINE EVALUATION  PAIN: Pain Level at Rest: 4/10  Pain Level with Use: 7/10  POSTURE: Standing Posture: Rounded shoulders, Lordosis decreased  BALANCE/PROPRIOCEPTION: Single Leg Stance Eyes Open (seconds): 5 seconds on R SLS, L ~ 2-3 sec  ROM:  Lumbar flexion to ankles, extension mod to max loss with ERP at L LB  STRENGTH:  B bridge with fair control (valsalva/small diastasis recti noted), able to correct with cues for TA engagement. TA poor to fair recruitment in supine with alternate leg marching.   FLEXIBILITY:  B hamstrings/hip flexor tightness.     PALPATION:  TTP at L L4-5 area  SPINAL SEGMENTAL CONCLUSIONS:  hypomobile L1-5 into PA/extension    Assessment & Plan   CLINICAL IMPRESSIONS  Medical Diagnosis:      Treatment Diagnosis:     Impression/Assessment: Patient is a 64 year old male  with L LBP complaints.  The following significant findings have been identified: Pain, Decreased ROM/flexibility, Decreased joint mobility, Decreased strength, Decreased proprioception, Inflammation, Impaired muscle performance, and Decreased activity tolerance. These impairments interfere with their ability to perform self care tasks, work tasks, recreational activities, household chores, driving , household mobility, and community mobility as compared to previous level of function.     Clinical Decision Making (Complexity):  Clinical Presentation: Stable/Uncomplicated  Clinical Presentation Rationale: based on medical and personal factors listed in PT evaluation  Clinical Decision Making (Complexity): Low complexity    PLAN OF CARE  Treatment Interventions:  Interventions: Manual Therapy, Neuromuscular Re-education, Therapeutic Activity, Therapeutic Exercise, Self-Care/Home Management    Long Term Goals            Frequency of Treatment:    Duration of Treatment:      Recommended Referrals to Other Professionals: Physical Therapy  Education Assessment:        Risks and benefits of evaluation/treatment have been explained.   Patient/Family/caregiver agrees with Plan of Care.     Evaluation Time:             Signing Clinician: Dylan Peters, PT,OCS

## 2024-06-15 PROBLEM — M54.50 CHRONIC LEFT-SIDED LOW BACK PAIN WITHOUT SCIATICA: Status: ACTIVE | Noted: 2024-06-15

## 2024-06-15 PROBLEM — G89.29 CHRONIC LEFT-SIDED LOW BACK PAIN WITHOUT SCIATICA: Status: ACTIVE | Noted: 2024-06-15

## 2024-06-17 ENCOUNTER — OFFICE VISIT (OUTPATIENT)
Dept: ORTHOPEDICS | Facility: CLINIC | Age: 64
End: 2024-06-17
Payer: COMMERCIAL

## 2024-06-17 DIAGNOSIS — M17.12 PRIMARY OSTEOARTHRITIS OF LEFT KNEE: ICD-10-CM

## 2024-06-17 DIAGNOSIS — M17.11 PRIMARY OSTEOARTHRITIS OF RIGHT KNEE: Primary | ICD-10-CM

## 2024-06-17 PROCEDURE — 20611 DRAIN/INJ JOINT/BURSA W/US: CPT | Mod: 50 | Performed by: STUDENT IN AN ORGANIZED HEALTH CARE EDUCATION/TRAINING PROGRAM

## 2024-06-17 RX ORDER — LIDOCAINE HYDROCHLORIDE 10 MG/ML
3 INJECTION, SOLUTION INFILTRATION; PERINEURAL
Status: SHIPPED | OUTPATIENT
Start: 2024-06-17

## 2024-06-17 RX ADMIN — LIDOCAINE HYDROCHLORIDE 3 ML: 10 INJECTION, SOLUTION INFILTRATION; PERINEURAL at 15:04

## 2024-06-17 NOTE — PROGRESS NOTES
Sports Medicine Procedure Note    Michael was seen today for procedure and procedure.    Diagnoses and all orders for this visit:    Primary osteoarthritis of right knee  -     Large Joint Injection/Arthocentesis: bilateral knee    Primary osteoarthritis of left knee  -     Large Joint Injection/Arthocentesis: bilateral knee        Skip Conner is a/an 64 year old male who is seen for Synvisc injection of bilateral knees.   Last injection: 10/11/22    -Bilateral HA injections performed today under USG, see procedure note below for details.   -Post-injection instructions were provided to the patient    Return if symptoms worsen or fail to improve.      Post-Injection Discharge Instructions    You may shower, however avoid swimming, tub baths or hot tubs for 24 hours following your procedure  You may have a mild to moderate increase in pain for a few days following the injection.  The lidocaine (local numbing medicine) will wear off in several hours. It usually takes 3-5 days for the steroid medication to start working although it may take up to 14 days for full effect.   You may use ice packs for 10-15 minutes, 3 to 4 times a day at the injection site for comfort if needed  You may use extra strength Tylenol for pain control if necessary   If you were fasting, you may resume your normal diet and medications after the procedure  If you have diabetes, your blood sugar may be higher than normal for 10-14 days following a steroid injection. Contact your doctor who manages your diabetes if your blood sugar is significantly higher than usual    If you experience any of the following, call Sports Medicine @ [phone] or [phone]  -Fever over 100 degrees F  -Swelling, bleeding, redness, drainage, warmth at the injection site  -New or significant worsening pain        Dr. FABIÁN Kenney, DO [facility]  Sports Medicine     -----    Large Joint Injection/Arthocentesis: bilateral knee    Date/Time:  6/17/2024 3:04 PM    Performed by: Sean Kenney DO  Authorized by: Sean Kenney DO    Indications:  Pain and osteoarthritis  Needle Size:  22 G  Guidance: ultrasound    Approach:  Anterolateral  Location:  Knee  Laterality:  Bilateral      Medications (Right):  48 mg hylan 48 MG/6ML; 3 mL lidocaine 1 %   Medications (Right) comment:  1ml of 8.4% Sodium Bicarbonate solution was used to buffer the local numbing agent for today's injection    Medications (Left):  48 mg hylan 48 MG/6ML; 3 mL lidocaine 1 %   Medications (Left) comment:  1ml of 8.4% Sodium Bicarbonate solution was used to buffer the local numbing agent for today's injection    Outcome:  Tolerated well, no immediate complications  Procedure discussed: discussed risks, benefits, and alternatives    Consent Given by:  Patient  Timeout: timeout called immediately prior to procedure    Prep: patient was prepped and draped in usual sterile fashion     Ultrasound was used to ensure safe and accurate needle placement and injection. Ultrasound images of the procedure were permanently stored.

## 2024-06-17 NOTE — LETTER
6/17/2024      Skip Conner  [address] [address] [facility] [address]      Dear Colleague,    Thank you for referring your patient, Skip Conner, to the [facility] [place]. Please see a copy of my visit note below.    Sports Medicine Procedure Note    Michael was seen today for procedure and procedure.    Diagnoses and all orders for this visit:    Primary osteoarthritis of right knee  -     Large Joint Injection/Arthocentesis: bilateral knee    Primary osteoarthritis of left knee  -     Large Joint Injection/Arthocentesis: bilateral knee        Skip Cnoner is a/an 64 year old male who is seen for Synvisc injection of bilateral knees.   Last injection: 10/11/22    -Bilateral HA injections performed today under USG, see procedure note below for details.   -Post-injection instructions were provided to the patient    Return if symptoms worsen or fail to improve.      Post-Injection Discharge Instructions    You may shower, however avoid swimming, tub baths or hot tubs for 24 hours following your procedure  You may have a mild to moderate increase in pain for a few days following the injection.  The lidocaine (local numbing medicine) will wear off in several hours. It usually takes 3-5 days for the steroid medication to start working although it may take up to 14 days for full effect.   You may use ice packs for 10-15 minutes, 3 to 4 times a day at the injection site for comfort if needed  You may use extra strength Tylenol for pain control if necessary   If you were fasting, you may resume your normal diet and medications after the procedure  If you have diabetes, your blood sugar may be higher than normal for 10-14 days following a steroid injection. Contact your doctor who manages your diabetes if your blood sugar is significantly higher than usual    If you experience any of the following, call Sports Medicine @ [phone] or [phone]  -Fever over 100 degrees F  -Swelling,  bleeding, redness, drainage, warmth at the injection site  -New or significant worsening pain        Dr. FABIÁN Kenney DO [facility]  [facility]  Sports Medicine     -----    Large Joint Injection/Arthocentesis: bilateral knee    Date/Time: 6/17/2024 3:04 PM    Performed by: Sean Kenney DO  Authorized by: Sean Kenney DO    Indications:  Pain and osteoarthritis  Needle Size:  22 G  Guidance: ultrasound    Approach:  Anterolateral  Location:  Knee  Laterality:  Bilateral      Medications (Right):  48 mg hylan 48 MG/6ML; 3 mL lidocaine 1 %   Medications (Right) comment:  1ml of 8.4% Sodium Bicarbonate solution was used to buffer the local numbing agent for today's injection    Medications (Left):  48 mg hylan 48 MG/6ML; 3 mL lidocaine 1 %   Medications (Left) comment:  1ml of 8.4% Sodium Bicarbonate solution was used to buffer the local numbing agent for today's injection    Outcome:  Tolerated well, no immediate complications  Procedure discussed: discussed risks, benefits, and alternatives    Consent Given by:  Patient  Timeout: timeout called immediately prior to procedure    Prep: patient was prepped and draped in usual sterile fashion     Ultrasound was used to ensure safe and accurate needle placement and injection. Ultrasound images of the procedure were permanently stored.          Again, thank you for allowing me to participate in the care of your patient.        Sincerely,        Sean Kenney DO

## 2024-07-29 PROBLEM — G89.29 CHRONIC LEFT-SIDED LOW BACK PAIN WITHOUT SCIATICA: Status: RESOLVED | Noted: 2024-06-15 | Resolved: 2024-07-29

## 2024-07-29 PROBLEM — M54.50 CHRONIC LEFT-SIDED LOW BACK PAIN WITHOUT SCIATICA: Status: RESOLVED | Noted: 2024-06-15 | Resolved: 2024-07-29

## 2024-08-28 ENCOUNTER — MYC MEDICAL ADVICE (OUTPATIENT)
Dept: FAMILY MEDICINE | Facility: CLINIC | Age: 64
End: 2024-08-28
Payer: COMMERCIAL

## 2025-01-14 ENCOUNTER — OFFICE VISIT (OUTPATIENT)
Dept: ORTHOPEDICS | Facility: CLINIC | Age: 65
End: 2025-01-14
Payer: COMMERCIAL

## 2025-01-14 VITALS — BODY MASS INDEX: 33.67 KG/M2 | WEIGHT: 262.4 LBS | HEIGHT: 74 IN

## 2025-01-14 DIAGNOSIS — M17.12 PRIMARY OSTEOARTHRITIS OF LEFT KNEE: Primary | ICD-10-CM

## 2025-01-14 PROCEDURE — 99213 OFFICE O/P EST LOW 20 MIN: CPT | Mod: 25 | Performed by: STUDENT IN AN ORGANIZED HEALTH CARE EDUCATION/TRAINING PROGRAM

## 2025-01-14 PROCEDURE — 20611 DRAIN/INJ JOINT/BURSA W/US: CPT | Mod: LT | Performed by: STUDENT IN AN ORGANIZED HEALTH CARE EDUCATION/TRAINING PROGRAM

## 2025-01-14 RX ADMIN — BETAMETHASONE SODIUM PHOSPHATE AND BETAMETHASONE ACETATE 30 MG: 3; 3 INJECTION, SUSPENSION INTRA-ARTICULAR; INTRALESIONAL; INTRAMUSCULAR; SOFT TISSUE at 15:44

## 2025-01-14 RX ADMIN — ROPIVACAINE HYDROCHLORIDE 2 ML: 5 INJECTION, SOLUTION EPIDURAL; INFILTRATION; PERINEURAL at 15:44

## 2025-01-14 RX ADMIN — LIDOCAINE HYDROCHLORIDE 5 ML: 10 INJECTION, SOLUTION INFILTRATION; PERINEURAL at 15:44

## 2025-01-14 NOTE — PROGRESS NOTES
ASSESSMENT & PLAN    Michael was seen today for pain.    Diagnoses and all orders for this visit:    Primary osteoarthritis of left knee  -     Large Joint Injection/Arthocentesis: L knee joint      This issue is acute and Unchanged. Michael presents to our clinic today to discuss his acute on chronic left knee pain.  He reports approximately 10 days ago when he got out of bed his leg felt stiff and worsened throughout the day.  He endorses his pain will worsen when sitting for a long time and then going from sit to stand, consistent with underlying degenerative joint disease.  On examination, he is tender in the medial joint line as well as the MCL margin, however he has no evidence of ligamentous laxity and no injury to suggest an MCL sprain.  We discussed that given his acute pain, we could trial a corticosteroid injection to help reduce inflammation from what I suspect is an acute flare of his underlying degenerative joint disease and after this discussion the patient was amenable to proceeding with a corticosteroid injection today.  We determine the following plan:  - CSI to the left knee performed today under ultrasound guidance, see procedure note below for details  - Can otherwise use ice and heat as needed  - He will follow-up with me for further evaluation and treatment in 4 to 6 weeks if not improving       Sean Kenney [facility] [place]    -----  Chief Complaint   Patient presents with    Left Knee - Pain       SUBJECTIVE  Skip Conner is a/an 64 year old male who is seen as a self referral for evaluation of left knee.     The patient is seen by themselves.      Onset: 10 days ago. Got out of bed and it was stiff and at work that day the pain was getting worse  Location of Pain: left knee medial  Worsened by: sitting for a long period and stand up  Better with: ice  Treatments tried: rest/activity avoidance, elevation, ice, heat, and ibuprofen,  06/17/24 injection  "Bilateral knee Synvisc one week of relief.  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  CV: distal perfusion intact   Resp: normal respiratory effort without conversational dyspnea   Psych: normal mood and affect  Gait: NORMAL  Neuro: Normal light sensory exam of LL extremity     Left Knee exam  Gait: Normal  Alignment:  [x] Normal  [] Anatomic valgus  [] Anatomic varus  Inspection: [x] Normal  [] Ecchymosis present []Other   Palpation:  Joint Tenderness: [] No Tenderness  [x] MJL [] LJL [x] MCL Margin [] LCL Margin [] Pes Anserine [] Distal Quadricep  [] Other   Peripatellar Tenderness: [x] None [] Lateral pole [] Medial pole [] Superior pole [] Inferior pole    Patellar tendon pain: [x] None [] Present    Patellar apprehension: [x] None [] Present    Patellar motion: [x] Normal [] Abnormal  Crepitus: [x] None [] Present  Effusion: [x] None [] Trace [] 1+ [] 2+ [] 3+  Range of motion:  Flexion: 135, Extension: 0  Strength:  [x] Full in all planes, including intact extensor mechanism [] Limited as described  Neurologic: Normal sensation   Vascular: Normal pulses   Special tests:   Lachman: Negative  Anterior Drawer: Negative  Sag/quad Activation: NP  Posterior Drawer: Negative  Valgus Stress: Negative at 0/30  Varus Stress: Negative at 0/30  Corrine's: Negative  Thessaly: NP     Other notable findings/comments: None       RADIOLOGY:  No new imaging taken in clinic today.     Large Joint Injection/Arthocentesis: L knee joint    Date/Time: 1/14/2025 3:44 PM    Performed by: Sean Kenney DO  Authorized by: Sean Kenney DO  "   Indications:  Pain and osteoarthritis  Needle Size:  22 G  Guidance: ultrasound    Approach:  Superolateral  Location:  Knee      Medications:  30 mg betamethasone acet & sod phos 6 (3-3) MG/ML; 5 mL lidocaine 1 %; 2 mL ROPivacaine 5 MG/ML  Medications comment:  1ml of 8.4% Sodium Bicarbonate solution was used to buffer the local numbing agent for today's injection    Outcome:  Tolerated well, no immediate complications  Procedure discussed: discussed risks, benefits, and alternatives    Consent Given by:  Patient  Timeout: timeout called immediately prior to procedure    Prep: patient was prepped and draped in usual sterile fashion     Ultrasound was used to ensure safe and accurate needle placement and injection. Ultrasound images of the procedure were permanently stored.

## 2025-01-14 NOTE — LETTER
1/14/2025      Skip Conner  [address] [address] [address]      Dear Colleague,    Thank you for referring your patient, Skip Conner, to the [facility]. Please see a copy of my visit note below.    ASSESSMENT & PLAN    Michael was seen today for pain.    Diagnoses and all orders for this visit:    Primary osteoarthritis of left knee  -     Large Joint Injection/Arthocentesis: L knee joint      This issue is acute and Unchanged. Michael presents to our clinic today to discuss his acute on chronic left knee pain.  He reports approximately 10 days ago when he got out of bed his leg felt stiff and worsened throughout the day.  He endorses his pain will worsen when sitting for a long time and then going from sit to stand, consistent with underlying degenerative joint disease.  On examination, he is tender in the medial joint line as well as the MCL margin, however he has no evidence of ligamentous laxity and no injury to suggest an MCL sprain.  We discussed that given his acute pain, we could trial a corticosteroid injection to help reduce inflammation from what I suspect is an acute flare of his underlying degenerative joint disease and after this discussion the patient was amenable to proceeding with a corticosteroid injection today.  We determine the following plan:  - CSI to the left knee performed today under ultrasound guidance, see procedure note below for details  - Can otherwise use ice and heat as needed  - He will follow-up with me for further evaluation and treatment in 4 to 6 weeks if not improving       Sean Kenney,   [facility]    -----  Chief Complaint   Patient presents with     Left Knee - Pain       SUBJECTIVE  Skip Conner is a/an 64 year old male who is seen as a self referral for evaluation of left knee.     The patient is seen by themselves.      Onset: 10 days ago. Got out of bed and it was stiff  "and at work that day the pain was getting worse  Location of Pain: left knee medial  Worsened by: sitting for a long period and stand up  Better with: ice  Treatments tried: rest/activity avoidance, elevation, ice, heat, and ibuprofen,  06/17/24 injection Bilateral knee Synvisc one week of relief.  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  CV: distal perfusion intact   Resp: normal respiratory effort without conversational dyspnea   Psych: normal mood and affect  Gait: NORMAL  Neuro: Normal light sensory exam of LL extremity     Left Knee exam  Gait: Normal  Alignment:  [x] Normal  [] Anatomic valgus  [] Anatomic varus  Inspection: [x] Normal  [] Ecchymosis present []Other   Palpation:  Joint Tenderness: [] No Tenderness  [x] MJL [] LJL [x] MCL Margin [] LCL Margin [] Pes Anserine [] Distal Quadricep  [] Other   Peripatellar Tenderness: [x] None [] Lateral pole [] Medial pole [] Superior pole [] Inferior pole    Patellar tendon pain: [x] None [] Present    Patellar apprehension: [x] None [] Present    Patellar motion: [x] Normal [] Abnormal  Crepitus: [x] None [] Present  Effusion: [x] None [] Trace [] 1+ [] 2+ [] 3+  Range of motion:  Flexion: 135, Extension: 0  Strength:  [x] Full in all planes, including intact extensor mechanism [] Limited as described  Neurologic: Normal sensation   Vascular: Normal pulses   Special tests:   Lachman: Negative  Anterior Drawer: Negative  Sag/quad Activation: NP  Posterior Drawer: Negative  Valgus Stress: Negative at 0/30  Varus Stress: Negative at 0/30  Corrine's: Negative  Thessaly: NP     Other " notable findings/comments: None       RADIOLOGY:  No new imaging taken in clinic today.     Large Joint Injection/Arthocentesis: L knee joint    Date/Time: 1/14/2025 3:44 PM    Performed by: Sean Kenney DO  Authorized by: Sean Kenney DO    Indications:  Pain and osteoarthritis  Needle Size:  22 G  Guidance: ultrasound    Approach:  Superolateral  Location:  Knee      Medications:  30 mg betamethasone acet & sod phos 6 (3-3) MG/ML; 5 mL lidocaine 1 %; 2 mL ROPivacaine 5 MG/ML  Medications comment:  1ml of 8.4% Sodium Bicarbonate solution was used to buffer the local numbing agent for today's injection    Outcome:  Tolerated well, no immediate complications  Procedure discussed: discussed risks, benefits, and alternatives    Consent Given by:  Patient  Timeout: timeout called immediately prior to procedure    Prep: patient was prepped and draped in usual sterile fashion     Ultrasound was used to ensure safe and accurate needle placement and injection. Ultrasound images of the procedure were permanently stored.            Again, thank you for allowing me to participate in the care of your patient.        Sincerely,        Sean Kenney DO    Electronically signed

## 2025-01-15 RX ORDER — LIDOCAINE HYDROCHLORIDE 10 MG/ML
5 INJECTION, SOLUTION INFILTRATION; PERINEURAL
Status: COMPLETED | OUTPATIENT
Start: 2025-01-14 | End: 2025-01-14

## 2025-01-15 RX ORDER — ROPIVACAINE HYDROCHLORIDE 5 MG/ML
2 INJECTION, SOLUTION EPIDURAL; INFILTRATION; PERINEURAL
Status: COMPLETED | OUTPATIENT
Start: 2025-01-14 | End: 2025-01-14

## 2025-01-15 RX ORDER — BETAMETHASONE SODIUM PHOSPHATE AND BETAMETHASONE ACETATE 3; 3 MG/ML; MG/ML
30 INJECTION, SUSPENSION INTRA-ARTICULAR; INTRALESIONAL; INTRAMUSCULAR; SOFT TISSUE
Status: COMPLETED | OUTPATIENT
Start: 2025-01-14 | End: 2025-01-14

## 2025-02-24 ENCOUNTER — OFFICE VISIT (OUTPATIENT)
Dept: FAMILY MEDICINE | Facility: CLINIC | Age: 65
End: 2025-02-24
Payer: COMMERCIAL

## 2025-02-24 VITALS
SYSTOLIC BLOOD PRESSURE: 132 MMHG | TEMPERATURE: 97.4 F | BODY MASS INDEX: 33.75 KG/M2 | RESPIRATION RATE: 20 BRPM | HEIGHT: 74 IN | HEART RATE: 85 BPM | DIASTOLIC BLOOD PRESSURE: 98 MMHG | WEIGHT: 263 LBS | OXYGEN SATURATION: 98 %

## 2025-02-24 DIAGNOSIS — R55 SYNCOPE AND COLLAPSE: ICD-10-CM

## 2025-02-24 DIAGNOSIS — I10 HYPERTENSION GOAL BP (BLOOD PRESSURE) < 140/90: ICD-10-CM

## 2025-02-24 DIAGNOSIS — I46.9 CARDIAC ARREST WITH SUCCESSFUL RESUSCITATION (H): Primary | ICD-10-CM

## 2025-02-24 DIAGNOSIS — E78.5 HYPERLIPIDEMIA LDL GOAL <130: ICD-10-CM

## 2025-02-24 LAB
ERYTHROCYTE [DISTWIDTH] IN BLOOD BY AUTOMATED COUNT: 12.2 % (ref 10–15)
ERYTHROCYTE [SEDIMENTATION RATE] IN BLOOD BY WESTERGREN METHOD: 10 MM/HR (ref 0–20)
HCT VFR BLD AUTO: 42.2 % (ref 40–53)
HGB BLD-MCNC: 14.3 G/DL (ref 13.3–17.7)
MCH RBC QN AUTO: 31.8 PG (ref 26.5–33)
MCHC RBC AUTO-ENTMCNC: 33.9 G/DL (ref 31.5–36.5)
MCV RBC AUTO: 94 FL (ref 78–100)
PLATELET # BLD AUTO: 230 10E3/UL (ref 150–450)
RBC # BLD AUTO: 4.5 10E6/UL (ref 4.4–5.9)
WBC # BLD AUTO: 6.6 10E3/UL (ref 4–11)

## 2025-02-24 PROCEDURE — 85652 RBC SED RATE AUTOMATED: CPT

## 2025-02-24 PROCEDURE — 84443 ASSAY THYROID STIM HORMONE: CPT

## 2025-02-24 PROCEDURE — 36415 COLL VENOUS BLD VENIPUNCTURE: CPT

## 2025-02-24 PROCEDURE — 85027 COMPLETE CBC AUTOMATED: CPT

## 2025-02-24 PROCEDURE — 86140 C-REACTIVE PROTEIN: CPT

## 2025-02-24 PROCEDURE — 80053 COMPREHEN METABOLIC PANEL: CPT

## 2025-02-24 RX ORDER — ATORVASTATIN CALCIUM 40 MG/1
40 TABLET, FILM COATED ORAL DAILY
Qty: 90 TABLET | Refills: 3 | Status: SHIPPED | OUTPATIENT
Start: 2025-02-24 | End: 2025-02-25

## 2025-02-24 NOTE — PROGRESS NOTES
"  Assessment & Plan     Cardiac arrest with successful resuscitation (H)  Syncope and collapse  Hyperlipidemia LDL goal <130  Hypertension goal BP (blood pressure) < 140/90  - EKG 12-lead complete w/read - Clinics  - Adult Cardiology Jose Guadalupe Yang Referral  - ZIO PATCH MAIL OUT  - Echocardiogram Complete  - CBC with platelets  - Comprehensive metabolic panel (BMP + Alb, Alk Phos, ALT, AST, Total. Bili, TP)  - ESR: Erythrocyte sedimentation rate  - CRP, inflammation  - atorvastatin (LIPITOR) 40 MG tablet  Dispense: 90 tablet; Refill: 3      Patient should follow up after seeing cardiology if symptoms do not improve or sooner for new or worsening symptoms. All questions answered to patient's satisfaction. Warning signs of when to seek emergency care were discussed.     Jess Santiago PA-C    MED REC REQUIRED{TIP  Click the link below to document or use med rec list, use list to pull in response :[id number]}  Post Medication Reconciliation Status:  Unable to reconcile discharge medications  BMI  Estimated body mass index is 33.77 kg/m  as calculated from the following:    Height as of this encounter: 1.88 m (6' 2\").    Weight as of this encounter: 119.3 kg (263 lb).   Weight management plan: Patient was referred to their PCP to discuss a diet and exercise plan.    Pushpa Rodriguez is a 65 year old, presenting for the following health issues:  recheck from an incident he had in [place]        2/24/2025     2:02 PM   Additional Questions   Roomed by Marianne PORTER   Skip is a 65 year old male who presents today with for follow up after an incident in [place] where pt needed CPR. Incident occurred 3 days ago. Unsure if this was a cardiac arrest as no EKG was done, but friend did not find a pulse and he came to after CPR. He did not seek care at a hospital or clinic after the incident.     Incident was at dinner. He wasn't hungry and pushed his food away. He put his head down and closed his eyes. Girlfriend was rubbing " "his back and his shirt all of a sudden became soaking wet. NP Friend who was with them came over and couldn't find a pulse. He was then moved to the floor and again they couldn't find a pulse, so the friend did CPR. Per girlfriend, CPR was performed for 2 minutes. Then all of a sudden his eyes opened and he started vomiting. Hotel staff brought the EMT over. BP was checked and heart was in rhythm. No EKG was done. Staff wanted to transfer pt to the hospital, but pt and girlfriend were coming back to the [place] the next day and he wanted to be in the [facility] for care. He slept well that night and was woken up at 2am and felt fine.     Since then he has felt fine. No headaches, nausea, vomiting. No chest pains. No chest wall tenderness. No bruising on his chest. Believes it was about 2 minutes from start of compressions to when he came to. No EKG. No aspirin. No other intervention afterwards.     Takes lisinopril and amlodipine for hypertension.     He has had more heartburn again lately. When he would eat something the food would feel like it got  stuck right below his ribcage.     TRIAGE NOTE 2/24:  Incident occurred on 2/21 - in [place] - everything was fine and good - relaxing in the pool, in the sun, out of the sun, didn't drink a whole lot - had reservations for dinner. Sat down for dinner. Food came, all of sudden no appetite - took my silverware and napkin and put on my plate, next thing he remembers is girlfriend asking \"are you ok\", he said \"we should probably go\" - gf saying he was sitting forward with head slumped, rubbing my shoulder, telling him to open your eyes, he opened eyes but didn't look at her. Closed eyes again, friend is NP - she took my pulse - had a pulse, then lost pulse, laid me on the floor, she did CPR - came to - out for almost 2 minutes - then became responsive. Went to the medical care at [facility] - wheeled me to their office, NP friend used stethoscope - said everything was fine - he got up " "and walked around, felt fine - slept good that night. Got back Saturday night. Asked me if my chest hurt from cpr - he said no, no pain or bruising. Feel like it never happened. Medic - thought too much heat - patient still concerned given cpr needed. NP friend thought when head fell forward head, pinched something off.     Past Medical History:   Diagnosis Date    History of colonic polyps 01/2019 4/2024 - 3 mm polyp - hyperplastic - due 5 yrs    Hyperlipidemia LDL goal <130     Hypertension goal BP (blood pressure) < 140/90     Idiopathic chronic gout of 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     Restless legs syndrome (RLS)      Past Surgical History:   Procedure Laterality Date    APPENDECTOMY  2008    COLONOSCOPY  01/2019    colon polyp, due 5 yrs    COLONOSCOPY N/A 04/30/2024    3 mm hyperplastic polyp - due 5 yrs     No Known Allergies      Review of Systems  Constitutional, neuro, ENT, endocrine, pulmonary, cardiac, gastrointestinal, genitourinary, musculoskeletal, integument and psychiatric systems are negative, except as otherwise noted.      Objective    BP (!) 132/98   Pulse 85   Temp 97.4  F (36.3  C)   Resp 20   Ht 1.88 m (6' 2\")   Wt 119.3 kg (263 lb)   SpO2 98%   BMI 33.77 kg/m    Body mass index is 33.77 kg/m .  Physical Exam   GENERAL: alert and no distress  EYES: Eyes grossly normal to inspection, PERRL and conjunctivae and sclerae normal  HENT: ear canals and TM's normal, nose and mouth without ulcers or lesions  NECK: no adenopathy, no asymmetry, masses, or scars  RESP: lungs clear to auscultation - no rales, rhonchi or wheezes  CV: regular rate and rhythm, normal S1 S2, no S3 or S4, no murmur, click or rub, no peripheral edema  ABDOMEN: soft, nontender, no hepatosplenomegaly, no masses and bowel sounds normal  MS: no gross musculoskeletal defects noted, no edema  SKIN: no suspicious lesions or rashes  NEURO: Normal strength and tone, mentation intact and speech normal  PSYCH: mentation appears " normal, affect normal/bright    EKG - Reviewed and interpreted by me appears normal, NSR, normal axis, normal intervals, no acute ST/T changes c/w ischemia, no LVH by voltage criteria, no Q waves.      Signed Electronically by: Jess Santiago PA-C  {Email feedback regarding this note to [email or web address]   :[id number]}

## 2025-02-25 DIAGNOSIS — M1A.09X0 IDIOPATHIC CHRONIC GOUT OF MULTIPLE SITES WITHOUT TOPHUS: ICD-10-CM

## 2025-02-25 DIAGNOSIS — I46.9 CARDIAC ARREST WITH SUCCESSFUL RESUSCITATION (H): ICD-10-CM

## 2025-02-25 DIAGNOSIS — I10 HYPERTENSION GOAL BP (BLOOD PRESSURE) < 140/90: ICD-10-CM

## 2025-02-25 DIAGNOSIS — R55 SYNCOPE AND COLLAPSE: ICD-10-CM

## 2025-02-25 DIAGNOSIS — E78.5 HYPERLIPIDEMIA LDL GOAL <130: ICD-10-CM

## 2025-02-25 LAB
ALBUMIN SERPL BCG-MCNC: 4.2 G/DL (ref 3.5–5.2)
ALP SERPL-CCNC: 78 U/L (ref 40–150)
ALT SERPL W P-5'-P-CCNC: 38 U/L (ref 0–70)
ANION GAP SERPL CALCULATED.3IONS-SCNC: 12 MMOL/L (ref 7–15)
AST SERPL W P-5'-P-CCNC: 28 U/L (ref 0–45)
BILIRUB SERPL-MCNC: 0.3 MG/DL
BUN SERPL-MCNC: 13.5 MG/DL (ref 8–23)
CALCIUM SERPL-MCNC: 9.3 MG/DL (ref 8.8–10.4)
CHLORIDE SERPL-SCNC: 101 MMOL/L (ref 98–107)
CREAT SERPL-MCNC: 1.13 MG/DL (ref 0.67–1.17)
CRP SERPL-MCNC: 3.12 MG/L
EGFRCR SERPLBLD CKD-EPI 2021: 72 ML/MIN/1.73M2
GLUCOSE SERPL-MCNC: 99 MG/DL (ref 70–99)
HCO3 SERPL-SCNC: 25 MMOL/L (ref 22–29)
POTASSIUM SERPL-SCNC: 4 MMOL/L (ref 3.4–5.3)
PROT SERPL-MCNC: 6.9 G/DL (ref 6.4–8.3)
SODIUM SERPL-SCNC: 138 MMOL/L (ref 135–145)
TSH SERPL DL<=0.005 MIU/L-ACNC: 1.58 UIU/ML (ref 0.3–4.2)

## 2025-02-25 RX ORDER — AMLODIPINE BESYLATE 5 MG/1
5 TABLET ORAL DAILY
Qty: 90 TABLET | Refills: 0 | Status: SHIPPED | OUTPATIENT
Start: 2025-02-25

## 2025-02-25 RX ORDER — ATORVASTATIN CALCIUM 40 MG/1
40 TABLET, FILM COATED ORAL DAILY
Qty: 90 TABLET | Refills: 0 | Status: SHIPPED | OUTPATIENT
Start: 2025-02-25

## 2025-02-25 RX ORDER — LISINOPRIL 20 MG/1
20 TABLET ORAL DAILY
Qty: 90 TABLET | Refills: 0 | Status: SHIPPED | OUTPATIENT
Start: 2025-02-25

## 2025-02-25 RX ORDER — ALLOPURINOL 300 MG/1
1 TABLET ORAL DAILY
Qty: 90 TABLET | Refills: 0 | Status: SHIPPED | OUTPATIENT
Start: 2025-02-25

## 2025-02-25 RX ORDER — ALLOPURINOL 100 MG/1
TABLET ORAL
Qty: 90 TABLET | Refills: 0 | Status: SHIPPED | OUTPATIENT
Start: 2025-02-25

## 2025-03-14 ENCOUNTER — HOSPITAL ENCOUNTER (OUTPATIENT)
Dept: CARDIOLOGY | Facility: CLINIC | Age: 65
Discharge: HOME | End: 2025-03-14
Payer: COMMERCIAL

## 2025-03-14 DIAGNOSIS — I46.9 CARDIAC ARREST WITH SUCCESSFUL RESUSCITATION (H): ICD-10-CM

## 2025-03-14 LAB — LVEF ECHO: NORMAL

## 2025-03-14 PROCEDURE — 93306 TTE W/DOPPLER COMPLETE: CPT | Mod: 26 | Performed by: INTERNAL MEDICINE

## 2025-03-14 PROCEDURE — 93306 TTE W/DOPPLER COMPLETE: CPT

## 2025-03-17 ENCOUNTER — MYC MEDICAL ADVICE (OUTPATIENT)
Dept: FAMILY MEDICINE | Facility: CLINIC | Age: 65
End: 2025-03-17
Payer: COMMERCIAL

## 2025-03-17 DIAGNOSIS — R55 SYNCOPE AND COLLAPSE: ICD-10-CM

## 2025-03-17 DIAGNOSIS — I46.9 CARDIAC ARREST WITH SUCCESSFUL RESUSCITATION (H): ICD-10-CM

## 2025-03-17 DIAGNOSIS — I46.9 CARDIAC ARREST (H): Primary | ICD-10-CM

## 2025-03-18 NOTE — TELEPHONE ENCOUNTER
Stress test was ordered 4/1/24- expires 4/1/25- need new order if want patient to do test.     Routing to Dr. Briceño.     Echo done 3/14/25    Mychart sent to patient.

## 2025-03-19 NOTE — TELEPHONE ENCOUNTER
Reviewed notes     Echo completed  Check on Zio patch  Advise cardiology consult ASAP, stress testing vs angiogram per cardiology - please get in the next few days

## 2025-04-01 ENCOUNTER — OFFICE VISIT (OUTPATIENT)
Dept: CARDIOLOGY | Facility: CLINIC | Age: 65
End: 2025-04-01
Attending: FAMILY MEDICINE
Payer: COMMERCIAL

## 2025-04-01 VITALS
BODY MASS INDEX: 34.1 KG/M2 | SYSTOLIC BLOOD PRESSURE: 148 MMHG | HEIGHT: 74 IN | DIASTOLIC BLOOD PRESSURE: 90 MMHG | WEIGHT: 265.7 LBS | HEART RATE: 74 BPM | OXYGEN SATURATION: 98 %

## 2025-04-01 DIAGNOSIS — I46.9 CARDIAC ARREST WITH SUCCESSFUL RESUSCITATION (H): ICD-10-CM

## 2025-04-01 DIAGNOSIS — I10 HYPERTENSION GOAL BP (BLOOD PRESSURE) < 140/90: ICD-10-CM

## 2025-04-01 DIAGNOSIS — I46.9 CARDIAC ARREST (H): ICD-10-CM

## 2025-04-01 DIAGNOSIS — R55 SYNCOPE AND COLLAPSE: ICD-10-CM

## 2025-04-01 RX ORDER — LISINOPRIL 30 MG/1
30 TABLET ORAL DAILY
Qty: 90 TABLET | Refills: 3 | Status: SHIPPED | OUTPATIENT
Start: 2025-04-01

## 2025-04-01 SDOH — HEALTH STABILITY: PHYSICAL HEALTH: ON AVERAGE, HOW MANY MINUTES DO YOU ENGAGE IN EXERCISE AT THIS LEVEL?: 20 MIN

## 2025-04-01 SDOH — HEALTH STABILITY: PHYSICAL HEALTH: ON AVERAGE, HOW MANY DAYS PER WEEK DO YOU ENGAGE IN MODERATE TO STRENUOUS EXERCISE (LIKE A BRISK WALK)?: 3 DAYS

## 2025-04-01 ASSESSMENT — PATIENT HEALTH QUESTIONNAIRE - PHQ9
SUM OF ALL RESPONSES TO PHQ QUESTIONS 1-9: 2
10. IF YOU CHECKED OFF ANY PROBLEMS, HOW DIFFICULT HAVE THESE PROBLEMS MADE IT FOR YOU TO DO YOUR WORK, TAKE CARE OF THINGS AT HOME, OR GET ALONG WITH OTHER PEOPLE: NOT DIFFICULT AT ALL
SUM OF ALL RESPONSES TO PHQ QUESTIONS 1-9: 2

## 2025-04-01 ASSESSMENT — ANXIETY QUESTIONNAIRES
1. FEELING NERVOUS, ANXIOUS, OR ON EDGE: NOT AT ALL
GAD7 TOTAL SCORE: 2
GAD7 TOTAL SCORE: 2
IF YOU CHECKED OFF ANY PROBLEMS ON THIS QUESTIONNAIRE, HOW DIFFICULT HAVE THESE PROBLEMS MADE IT FOR YOU TO DO YOUR WORK, TAKE CARE OF THINGS AT HOME, OR GET ALONG WITH OTHER PEOPLE: NOT DIFFICULT AT ALL
7. FEELING AFRAID AS IF SOMETHING AWFUL MIGHT HAPPEN: NOT AT ALL
6. BECOMING EASILY ANNOYED OR IRRITABLE: SEVERAL DAYS
5. BEING SO RESTLESS THAT IT IS HARD TO SIT STILL: NOT AT ALL
2. NOT BEING ABLE TO STOP OR CONTROL WORRYING: SEVERAL DAYS
7. FEELING AFRAID AS IF SOMETHING AWFUL MIGHT HAPPEN: NOT AT ALL
8. IF YOU CHECKED OFF ANY PROBLEMS, HOW DIFFICULT HAVE THESE MADE IT FOR YOU TO DO YOUR WORK, TAKE CARE OF THINGS AT HOME, OR GET ALONG WITH OTHER PEOPLE?: NOT DIFFICULT AT ALL
4. TROUBLE RELAXING: NOT AT ALL
GAD7 TOTAL SCORE: 2
3. WORRYING TOO MUCH ABOUT DIFFERENT THINGS: NOT AT ALL

## 2025-04-01 ASSESSMENT — SOCIAL DETERMINANTS OF HEALTH (SDOH): HOW OFTEN DO YOU GET TOGETHER WITH FRIENDS OR RELATIVES?: ONCE A WEEK

## 2025-04-01 NOTE — LETTER
"4/1/2025    Kal Briceño MD  [address] [facility] [address]    RE: Skip Conner       Dear Colleague,     I had the pleasure of seeing Skip Conner in the [facility].      Cardiology Clinic Consultation:    April 1, 2025   Patient Name: Skip Conner  Patient MRN: [id number]    Consult indication: possible cardiac arrest/syncope    HPI:    I had the opportunity to see patient Skip Conner in cardiology clinic for a consultation. Patient is followed by our colleague Kal Briceño MD with Primary Care.     As you know, patient is a pleasant 65-year-old male with a past medical history significant for obesity, hypertension, hyperlipidemia, who presents for further evaluation and management of syncope/possible cardiac arrest event approximately 2 months ago.    Patient was vacationing in [place] approximately 2 months ago.  On the day of the event, he was drinking alcohol and trying to stay out of the sun, he notes that it was quite a hot day.  He was sitting down for dinner with his companions when he suddenly felt \"off\", lost his appetite.  He denies any palpitations, chest pain/chest pressure prior to the event, though does note some mild nonexertional GERD symptoms earlier that week.  Subsequently he reports that his friend saw him slumped over and lose consciousness, they did not feel that he had a pulse, so started CPR.  After 1 round of CPR, they are about to start another round when the patient regained consciousness and vomited, and was noted to be severely diaphoretic.  He reports that he recovered very quickly and was able to walk out of the restaurant, and declined further medical care.  In retrospect, patient reports that he had a similar episode approximately 2 years ago while vacationing in the [place], had been drinking alcohol that day, also a hot day, and felt \"off\" and lost appetite at around dinner, though did not lose consciousness at that time.    Otherwise " patient reports that he feels well.  Since being back, he has been exercising a few times a week without any abnormal symptoms.    No family history of sudden cardiac death.    Reviewed  ECG 2/24/2025 demonstrating normal sinus rhythm, normal intervals.  Zio patch monitor demonstrated underlying rhythm was sinus, there were 6 short runs of supraventricular tachycardia, fastest was 9 beats with a max rate of 180 bpm, longest was 11 beats with an average rate of 107 bpm.  No significant bradycardia, no VT.  Patient triggered events were by accident.    TTE normal LVEF 55 to 60%, normal RV function, no significant valvular abnormalities.      Assessment and Plan/Recommendations:    # Possible cardiac arrest/syncope, clinical history seems more vasovagal with similar episode happening approximately 2 years ago under similar circumstances (vacation in the [place], hot day, drinking alcohol, symptoms occurring in the early evening).  Workup so far has been reassuring, echocardiogram demonstrates normal cardiac structure and function, Zio patch monitor did not demonstrate any clear culprit arrhythmias.  ECG without Brugada pattern or other concerning findings.    - Exercise stress echocardiogram given risk factors for CAD  - 30-day cardiac event monitor, pending findings, may benefit from implantable loop recorder  - Counseled on the importance of staying hydrated, cutting back on alcohol when under similar circumstances  - Counseled on vagal maneuvers in case he develops symptoms from SVT  - BP elevated, will increase lisinopril to 30 mg daily, RN BP check and BMP in 2 weeks  - Follow-up in cardiology clinic pending the aforementioned diagnostic studies        Thank you for allowing our team to participate in the care of Skip Conner.  Please do not hesitate to call or page me with any questions or concerns.    Sincerely,     Ace Stanton MD, [facility]  Cardiology  April 1, 2025      Kal Briceño MD  [address]  [place] [address]    Voice recognition software utilized.     Total time spent on this encounter today: Greater than 60 minutes, providing care in this encounter including, but not limited to, reviewing prior medical records, laboratory data, imaging studies, diagnostic studies, procedure notes, formulating an assessment and plan, recommendations, discussion and counseling with patient face to face, dictation.    Past Medical History:     Patient Active Problem List   Diagnosis     Hypertension goal BP (blood pressure) < 140/90     Hyperlipidemia LDL goal <130     Idiopathic chronic gout of 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     Restless legs syndrome (RLS)     History of colonic polyps       Past Surgical History:   Past Surgical History:   Procedure Laterality Date     APPENDECTOMY  2008     COLONOSCOPY  01/2019    colon polyp, due 5 yrs     COLONOSCOPY N/A 04/30/2024    3 mm hyperplastic polyp - due 5 yrs       Medications (outpatient):  Current Outpatient Medications   Medication Sig Dispense Refill     allopurinol (ZYLOPRIM) 100 MG tablet TAKE 1 TABLET BY MOUTH DAILY ALONG WITH 300MG TAB 90 tablet 0     allopurinol (ZYLOPRIM) 300 MG tablet Take 1 tablet (300 mg) by mouth daily. 90 tablet 0     amLODIPine (NORVASC) 5 MG tablet Take 1 tablet (5 mg) by mouth daily. 90 tablet 0     atorvastatin (LIPITOR) 40 MG tablet Take 1 tablet (40 mg) by mouth daily. 90 tablet 0     lisinopril (ZESTRIL) 30 MG tablet Take 1 tablet (30 mg) by mouth daily. 90 tablet 3     rOPINIRole (REQUIP) 1 MG tablet Take 1 tablet (1 mg) by mouth every morning 90 tablet 3     rOPINIRole (REQUIP) 3 MG tablet TAKE ONE TABLET BY MOUTH ONE TIME DAILY IN THE EVENING 90 tablet 0       Allergies:  No Known Allergies    Social History:   History   Drug Use Unknown      History   Smoking Status     Never   Smokeless Tobacco     Never     Social History    Substance and Sexual Activity      Alcohol use: Yes        Alcohol/week: 5.0  "standard drinks of alcohol        Types: 5 Standard drinks or equivalent per week       Family History:  Family History   Problem Relation Age of Onset     Arthritis Mother      Heart Disease Father      Hypertension Father      Colon Cancer No family hx of        Review of Systems:   A comprehensive 12 system review of systems was carried out.  Pertinent positives and negatives are noted above. Otherwise negative for contributory information.    Objective & Physical Exam:  BP (!) 148/90 (BP Location: Right arm, Patient Position: Sitting, Cuff Size: Adult Large)   Pulse 74   Ht 1.88 m (6' 2\")   Wt 120.5 kg (265 lb 11.2 oz)   SpO2 98%   BMI 34.11 kg/m    Wt Readings from Last 2 Encounters:   04/01/25 120.5 kg (265 lb 11.2 oz)   02/24/25 119.3 kg (263 lb)     Body mass index is 34.11 kg/m .   Body surface area is 2.51 meters squared.    Constitutional: appears stated age, in no apparent distress, appears to be well nourished  Pulmonary: clear to auscultation bilaterally, no wheezes, no rales, no increased work of breathing  Cardiovascular: JVP normal, regular rate, regular rhythm, no murmur appreciated, no lower extremity edema  Gastrointestinal: no guarding, non-rigid   Neurologic: awake, alert, moves all extremities  Skin: no jaundice, warm on limited exam    Data reviewed:  Lab Results   Component Value Date    WBC 6.6 02/24/2025    WBC 9.6 11/19/2020    RBC 4.50 02/24/2025    RBC 5.04 11/19/2020    HGB 14.3 02/24/2025    HGB 15.6 11/19/2020    HCT 42.2 02/24/2025    HCT 47.0 11/19/2020    MCV 94 02/24/2025    MCV 93 11/19/2020    MCH 31.8 02/24/2025    MCH 31.0 11/19/2020    MCHC 33.9 02/24/2025    MCHC 33.2 11/19/2020    RDW 12.2 02/24/2025    RDW 12.0 11/19/2020     02/24/2025     11/19/2020     Sodium   Date Value Ref Range Status   02/24/2025 138 135 - 145 mmol/L Final   11/19/2020 138 133 - 144 mmol/L Final     Potassium   Date Value Ref Range Status   02/24/2025 4.0 3.4 - 5.3 mmol/L Final "   01/19/2022 4.3 3.4 - 5.3 mmol/L Final   11/19/2020 4.3 3.4 - 5.3 mmol/L Final     Chloride   Date Value Ref Range Status   02/24/2025 101 98 - 107 mmol/L Final   01/19/2022 109 94 - 109 mmol/L Final   11/19/2020 109 94 - 109 mmol/L Final     Carbon Dioxide   Date Value Ref Range Status   11/19/2020 24 20 - 32 mmol/L Final     Carbon Dioxide (CO2)   Date Value Ref Range Status   02/24/2025 25 22 - 29 mmol/L Final   01/19/2022 22 20 - 32 mmol/L Final     Anion Gap   Date Value Ref Range Status   02/24/2025 12 7 - 15 mmol/L Final   01/19/2022 9 3 - 14 mmol/L Final   11/19/2020 5 3 - 14 mmol/L Final     Glucose   Date Value Ref Range Status   02/24/2025 99 70 - 99 mg/dL Final   01/19/2022 91 70 - 99 mg/dL Final   11/19/2020 146 (H) 70 - 99 mg/dL Final     Urea Nitrogen   Date Value Ref Range Status   02/24/2025 13.5 8.0 - 23.0 mg/dL Final   01/19/2022 16 7 - 30 mg/dL Final   11/19/2020 20 7 - 30 mg/dL Final     Creatinine   Date Value Ref Range Status   02/24/2025 1.13 0.67 - 1.17 mg/dL Final   11/19/2020 1.06 0.66 - 1.25 mg/dL Final     GFR Estimate   Date Value Ref Range Status   02/24/2025 72 >60 mL/min/1.73m2 Final     Comment:     eGFR calculated using 2021 CKD-EPI equation.   11/19/2020 75 >60 mL/min/[1.73_m2] Final     Comment:     Non  GFR Calc  Starting 12/18/2018, serum creatinine based estimated GFR (eGFR) will be   calculated using the Chronic Kidney Disease Epidemiology Collaboration   (CKD-EPI) equation.       Calcium   Date Value Ref Range Status   02/24/2025 9.3 8.8 - 10.4 mg/dL Final   11/19/2020 9.4 8.5 - 10.1 mg/dL Final     Bilirubin Total   Date Value Ref Range Status   02/24/2025 0.3 <=1.2 mg/dL Final   11/19/2020 0.6 0.2 - 1.3 mg/dL Final     Alkaline Phosphatase   Date Value Ref Range Status   02/24/2025 78 40 - 150 U/L Final   11/19/2020 83 40 - 150 U/L Final     ALT   Date Value Ref Range Status   02/24/2025 38 0 - 70 U/L Final   11/19/2020 50 0 - 70 U/L Final     AST   Date  Value Ref Range Status   2025 28 0 - 45 U/L Final   2020 25 0 - 45 U/L Final     Recent Labs   Lab Test 24  1420 23  0821   CHOL 236* 229*   HDL 53 51   * 143*   TRIG 178* 173*      Lab Results   Component Value Date    A1C 5.3 2015        Recent Results (from the past 4320 hours)   Echocardiogram Complete   Result Value    LVEF  55-60%    [facility]    [id number]  [id number]  [id number]  [id number]^GREGORY^MISTY^R     [facility]  Echocardiography Laboratory  [address] [place] [address]     Name: DIMITRIS PACHECO  MRN: [id number]  : 1960  Study Date: 2025 11:51 AM  Age: 65 yrs  Gender: Male  Patient Location: [facility]  Reason For Study: Cardiac arrest with successful resuscitation (H)  Ordering Physician: MISTY JENKINS  Referring Physician: MISTY JENKINS  Performed By: Ernesto Graf RDCS     BSA: 2.5 m2  Height: 74 in  Weight: 268 lb  HR: 75  BP: 180/112 mmHg  ______________________________________________________________________________  Procedure  Echocardiogram with two-dimensional, color and spectral Doppler.  ______________________________________________________________________________  Interpretation Summary     The visual ejection fraction is 55-60%.  The right ventricle is normal in structure, function and size.  There is no pericardial effusion.  No signficant valvular lesions.  The study was technically difficult.  ______________________________________________________________________________  Left Ventricle  The left ventricle is normal in structure, function and size. Left ventricular  diastolic function is normal. The visual ejection fraction is 55-60%.     Right Ventricle  The right ventricle is normal in structure, function and size.     Atria  Normal left atrial size. Right atrial size is normal.     Mitral Valve  The mitral valve leaflets appear normal. There is no evidence of stenosis,  fluttering, or prolapse. The mitral valve is  normal in structure and function.     Tricuspid Valve  Normal tricuspid valve.     Aortic Valve  The aortic valve is not well visualized. No aortic regurgitation is present.  No aortic stenosis is present.     Pulmonic Valve  The pulmonic valve is not well visualized.     Vessels  The aortic root is normal size.     Pericardium  There is no pericardial effusion.     Rhythm  Sinus rhythm was noted.  ______________________________________________________________________________  MMode/2D Measurements & Calculations  IVSd: 1.3 cm     LVIDd: 5.5 cm  LVIDs: 3.3 cm  LVPWd: 1.3 cm  FS: 39.8 %  LV mass(C)d: 303.9 grams  LV mass(C)dI: 123.5 grams/m2  Ao root diam: 3.3 cm  asc Aorta Diam: 3.2 cm  LVOT diam: 2.1 cm  LVOT area: 3.4 cm2  Ao root diam index Ht(cm/m): 1.8  Ao root diam index BSA (cm/m2): 1.4  Asc Ao diam index BSA (cm/m2): 1.3  Asc Ao diam index Ht(cm/m): 1.7     LA Volume Index (BP): 27.2 ml/m2  RWT: 0.48     Doppler Measurements & Calculations  MV E max zaki: 68.7 cm/sec  MV A max zaki: 98.1 cm/sec  MV E/A: 0.70  MV max P.0 mmHg  MV mean P.0 mmHg  MV V2 VTI: 25.9 cm  MVA(VTI): 3.8 cm2  MV dec slope: 311.7 cm/sec2  MV dec time: 0.22 sec  LV V1 max P.1 mmHg  LV V1 max: 133.5 cm/sec  LV V1 VTI: 28.8 cm  SV(LVOT): 97.9 ml  SI(LVOT): 39.8 ml/m2  PA acc time: 0.09 sec     ______________________________________________________________________________  Report approved by: Dawood Laienz MD on 2025 01:26 PM              Thank you for allowing me to participate in the care of your patient.      Sincerely,     Ace Stanton MD     [facility]  cc:   Kal Briceño MD  [address] [place] [address]

## 2025-04-01 NOTE — PATIENT INSTRUCTIONS
April 1, 2025    Thank you for allowing our Cardiology team to participate in your care.     Please note the following changes to your heart treatment plan:     Medication changes:   - increase lisinopril to 30 mg daily    Tests to be done:  - non-fasting labs in 2 weeks  - cardiac event monitor 30 days  - exercise stress echocardiogram     Follow up:  - Follow up in about 2 weeks for RN BP check      For scheduling, please call [phone].      Please contact our team through [employer] or our Nurse Team Voicemail service [phone] for questions or concerns.     General Clinic [phone]     If you are having a medical emergency, please call 911.     Sincerely,    Ace Stanton MD, FACC  Cardiology    [facility] and [facility] - [facility]

## 2025-04-01 NOTE — PROGRESS NOTES
"    Cardiology Clinic Consultation:    April 1, 2025   Patient Name: Skip Conner  Patient MRN: [id number]    Consult indication: possible cardiac arrest/syncope    HPI:    I had the opportunity to see patient Skip Conner in cardiology clinic for a consultation. Patient is followed by our colleague Kal Briceño MD with Primary Care.     As you know, patient is a pleasant 65-year-old male with a past medical history significant for obesity, hypertension, hyperlipidemia, who presents for further evaluation and management of syncope/possible cardiac arrest event approximately 2 months ago.    Patient was vacationing in [place] approximately 2 months ago.  On the day of the event, he was drinking alcohol and trying to stay out of the sun, he notes that it was quite a hot day.  He was sitting down for dinner with his companions when he suddenly felt \"off\", lost his appetite.  He denies any palpitations, chest pain/chest pressure prior to the event, though does note some mild nonexertional GERD symptoms earlier that week.  Subsequently he reports that his friend saw him slumped over and lose consciousness, they did not feel that he had a pulse, so started CPR.  After 1 round of CPR, they are about to start another round when the patient regained consciousness and vomited, and was noted to be severely diaphoretic.  He reports that he recovered very quickly and was able to walk out of the restaurant, and declined further medical care.  In retrospect, patient reports that he had a similar episode approximately 2 years ago while vacationing in the [place], had been drinking alcohol that day, also a hot day, and felt \"off\" and lost appetite at around dinner, though did not lose consciousness at that time.    Otherwise patient reports that he feels well.  Since being back, he has been exercising a few times a week without any abnormal symptoms.    No family history of sudden cardiac death.    Reviewed  ECG " 2/24/2025 demonstrating normal sinus rhythm, normal intervals.  Zio patch monitor demonstrated underlying rhythm was sinus, there were 6 short runs of supraventricular tachycardia, fastest was 9 beats with a max rate of 180 bpm, longest was 11 beats with an average rate of 107 bpm.  No significant bradycardia, no VT.  Patient triggered events were by accident.    TTE normal LVEF 55 to 60%, normal RV function, no significant valvular abnormalities.      Assessment and Plan/Recommendations:    # Possible cardiac arrest/syncope, clinical history seems more vasovagal with similar episode happening approximately 2 years ago under similar circumstances (vacation in the [facility], hot day, drinking alcohol, symptoms occurring in the early evening).  Workup so far has been reassuring, echocardiogram demonstrates normal cardiac structure and function, Zio patch monitor did not demonstrate any clear culprit arrhythmias.  ECG without Brugada pattern or other concerning findings.    - Exercise stress echocardiogram given risk factors for CAD  - 30-day cardiac event monitor, pending findings, may benefit from implantable loop recorder  - Counseled on the importance of staying hydrated, cutting back on alcohol when under similar circumstances  - Counseled on vagal maneuvers in case he develops symptoms from SVT  - BP elevated, will increase lisinopril to 30 mg daily, RN BP check and BMP in 2 weeks  - Follow-up in cardiology clinic pending the aforementioned diagnostic studies        Thank you for allowing our team to participate in the care of Skip Conner.  Please do not hesitate to call or page me with any questions or concerns.    Sincerely,     Ace Stanton MD, [facility]  Cardiology  April 1, 2025      Kal Briceño MD  [address] [place] [address]    Voice recognition software utilized.     Total time spent on this encounter today: Greater than 60 minutes, providing care in this encounter  including, but not limited to, reviewing prior medical records, laboratory data, imaging studies, diagnostic studies, procedure notes, formulating an assessment and plan, recommendations, discussion and counseling with patient face to face, dictation.    Past Medical History:     Patient Active Problem List   Diagnosis    Hypertension goal BP (blood pressure) < 140/90    Hyperlipidemia LDL goal <130    Idiopathic chronic gout of 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    Restless legs syndrome (RLS)    History of colonic polyps       Past Surgical History:   Past Surgical History:   Procedure Laterality Date    APPENDECTOMY  2008    COLONOSCOPY  01/2019    colon polyp, due 5 yrs    COLONOSCOPY N/A 04/30/2024    3 mm hyperplastic polyp - due 5 yrs       Medications (outpatient):  Current Outpatient Medications   Medication Sig Dispense Refill    allopurinol (ZYLOPRIM) 100 MG tablet TAKE 1 TABLET BY MOUTH DAILY ALONG WITH 300MG TAB 90 tablet 0    allopurinol (ZYLOPRIM) 300 MG tablet Take 1 tablet (300 mg) by mouth daily. 90 tablet 0    amLODIPine (NORVASC) 5 MG tablet Take 1 tablet (5 mg) by mouth daily. 90 tablet 0    atorvastatin (LIPITOR) 40 MG tablet Take 1 tablet (40 mg) by mouth daily. 90 tablet 0    lisinopril (ZESTRIL) 30 MG tablet Take 1 tablet (30 mg) by mouth daily. 90 tablet 3    rOPINIRole (REQUIP) 1 MG tablet Take 1 tablet (1 mg) by mouth every morning 90 tablet 3    rOPINIRole (REQUIP) 3 MG tablet TAKE ONE TABLET BY MOUTH ONE TIME DAILY IN THE EVENING 90 tablet 0       Allergies:  No Known Allergies    Social History:   History   Drug Use Unknown      History   Smoking Status    Never   Smokeless Tobacco    Never     Social History    Substance and Sexual Activity      Alcohol use: Yes        Alcohol/week: 5.0 standard drinks of alcohol        Types: 5 Standard drinks or equivalent per week       Family History:  Family History   Problem Relation Age of Onset    Arthritis Mother     Heart Disease Father      "Hypertension Father     Colon Cancer No family hx of        Review of Systems:   A comprehensive 12 system review of systems was carried out.  Pertinent positives and negatives are noted above. Otherwise negative for contributory information.    Objective & Physical Exam:  BP (!) 148/90 (BP Location: Right arm, Patient Position: Sitting, Cuff Size: Adult Large)   Pulse 74   Ht 1.88 m (6' 2\")   Wt 120.5 kg (265 lb 11.2 oz)   SpO2 98%   BMI 34.11 kg/m    Wt Readings from Last 2 Encounters:   04/01/25 120.5 kg (265 lb 11.2 oz)   02/24/25 119.3 kg (263 lb)     Body mass index is 34.11 kg/m .   Body surface area is 2.51 meters squared.    Constitutional: appears stated age, in no apparent distress, appears to be well nourished  Pulmonary: clear to auscultation bilaterally, no wheezes, no rales, no increased work of breathing  Cardiovascular: JVP normal, regular rate, regular rhythm, no murmur appreciated, no lower extremity edema  Gastrointestinal: no guarding, non-rigid   Neurologic: awake, alert, moves all extremities  Skin: no jaundice, warm on limited exam    Data reviewed:  Lab Results   Component Value Date    WBC 6.6 02/24/2025    WBC 9.6 11/19/2020    RBC 4.50 02/24/2025    RBC 5.04 11/19/2020    HGB 14.3 02/24/2025    HGB 15.6 11/19/2020    HCT 42.2 02/24/2025    HCT 47.0 11/19/2020    MCV 94 02/24/2025    MCV 93 11/19/2020    MCH 31.8 02/24/2025    MCH 31.0 11/19/2020    MCHC 33.9 02/24/2025    MCHC 33.2 11/19/2020    RDW 12.2 02/24/2025    RDW 12.0 11/19/2020     02/24/2025     11/19/2020     Sodium   Date Value Ref Range Status   02/24/2025 138 135 - 145 mmol/L Final   11/19/2020 138 133 - 144 mmol/L Final     Potassium   Date Value Ref Range Status   02/24/2025 4.0 3.4 - 5.3 mmol/L Final   01/19/2022 4.3 3.4 - 5.3 mmol/L Final   11/19/2020 4.3 3.4 - 5.3 mmol/L Final     Chloride   Date Value Ref Range Status   02/24/2025 101 98 - 107 mmol/L Final   01/19/2022 109 94 - 109 mmol/L Final "   11/19/2020 109 94 - 109 mmol/L Final     Carbon Dioxide   Date Value Ref Range Status   11/19/2020 24 20 - 32 mmol/L Final     Carbon Dioxide (CO2)   Date Value Ref Range Status   02/24/2025 25 22 - 29 mmol/L Final   01/19/2022 22 20 - 32 mmol/L Final     Anion Gap   Date Value Ref Range Status   02/24/2025 12 7 - 15 mmol/L Final   01/19/2022 9 3 - 14 mmol/L Final   11/19/2020 5 3 - 14 mmol/L Final     Glucose   Date Value Ref Range Status   02/24/2025 99 70 - 99 mg/dL Final   01/19/2022 91 70 - 99 mg/dL Final   11/19/2020 146 (H) 70 - 99 mg/dL Final     Urea Nitrogen   Date Value Ref Range Status   02/24/2025 13.5 8.0 - 23.0 mg/dL Final   01/19/2022 16 7 - 30 mg/dL Final   11/19/2020 20 7 - 30 mg/dL Final     Creatinine   Date Value Ref Range Status   02/24/2025 1.13 0.67 - 1.17 mg/dL Final   11/19/2020 1.06 0.66 - 1.25 mg/dL Final     GFR Estimate   Date Value Ref Range Status   02/24/2025 72 >60 mL/min/1.73m2 Final     Comment:     eGFR calculated using 2021 CKD-EPI equation.   11/19/2020 75 >60 mL/min/[1.73_m2] Final     Comment:     Non  GFR Calc  Starting 12/18/2018, serum creatinine based estimated GFR (eGFR) will be   calculated using the Chronic Kidney Disease Epidemiology Collaboration   (CKD-EPI) equation.       Calcium   Date Value Ref Range Status   02/24/2025 9.3 8.8 - 10.4 mg/dL Final   11/19/2020 9.4 8.5 - 10.1 mg/dL Final     Bilirubin Total   Date Value Ref Range Status   02/24/2025 0.3 <=1.2 mg/dL Final   11/19/2020 0.6 0.2 - 1.3 mg/dL Final     Alkaline Phosphatase   Date Value Ref Range Status   02/24/2025 78 40 - 150 U/L Final   11/19/2020 83 40 - 150 U/L Final     ALT   Date Value Ref Range Status   02/24/2025 38 0 - 70 U/L Final   11/19/2020 50 0 - 70 U/L Final     AST   Date Value Ref Range Status   02/24/2025 28 0 - 45 U/L Final   11/19/2020 25 0 - 45 U/L Final     Recent Labs   Lab Test 04/01/24  1420 03/29/23  0821   CHOL 236* 229*   HDL 53 51   * 143*   TRIG 178*  173*      Lab Results   Component Value Date    A1C 5.3 2015        Recent Results (from the past 4320 hours)   Echocardiogram Complete   Result Value    LVEF  55-60%    Narrative    [id number]  [id number]  [id number]  [id number]^GREGORY^MISTY^R     [facility]  Echocardiography Laboratory  [address] [place] [address]     Name: DIMITRIS PACHECO  MRN: [id number]  : 1960  Study Date: 2025 11:51 AM  Age: 65 yrs  Gender: Male  Patient Location: [facility]  Reason For Study: Cardiac arrest with successful resuscitation (H)  Ordering Physician: MISTY JENKINS  Referring Physician: MISTY JENKINS  Performed By: Ernesto Graf RDCS     BSA: 2.5 m2  Height: 74 in  Weight: 268 lb  HR: 75  BP: 180/112 mmHg  ______________________________________________________________________________  Procedure  Echocardiogram with two-dimensional, color and spectral Doppler.  ______________________________________________________________________________  Interpretation Summary     The visual ejection fraction is 55-60%.  The right ventricle is normal in structure, function and size.  There is no pericardial effusion.  No signficant valvular lesions.  The study was technically difficult.  ______________________________________________________________________________  Left Ventricle  The left ventricle is normal in structure, function and size. Left ventricular  diastolic function is normal. The visual ejection fraction is 55-60%.     Right Ventricle  The right ventricle is normal in structure, function and size.     Atria  Normal left atrial size. Right atrial size is normal.     Mitral Valve  The mitral valve leaflets appear normal. There is no evidence of stenosis,  fluttering, or prolapse. The mitral valve is normal in structure and function.     Tricuspid Valve  Normal tricuspid valve.     Aortic Valve  The aortic valve is not well visualized. No aortic regurgitation is present.  No aortic stenosis is  present.     Pulmonic Valve  The pulmonic valve is not well visualized.     Vessels  The aortic root is normal size.     Pericardium  There is no pericardial effusion.     Rhythm  Sinus rhythm was noted.  ______________________________________________________________________________  MMode/2D Measurements & Calculations  IVSd: 1.3 cm     LVIDd: 5.5 cm  LVIDs: 3.3 cm  LVPWd: 1.3 cm  FS: 39.8 %  LV mass(C)d: 303.9 grams  LV mass(C)dI: 123.5 grams/m2  Ao root diam: 3.3 cm  asc Aorta Diam: 3.2 cm  LVOT diam: 2.1 cm  LVOT area: 3.4 cm2  Ao root diam index Ht(cm/m): 1.8  Ao root diam index BSA (cm/m2): 1.4  Asc Ao diam index BSA (cm/m2): 1.3  Asc Ao diam index Ht(cm/m): 1.7     LA Volume Index (BP): 27.2 ml/m2  RWT: 0.48     Doppler Measurements & Calculations  MV E max zaki: 68.7 cm/sec  MV A max zaki: 98.1 cm/sec  MV E/A: 0.70  MV max P.0 mmHg  MV mean P.0 mmHg  MV V2 VTI: 25.9 cm  MVA(VTI): 3.8 cm2  MV dec slope: 311.7 cm/sec2  MV dec time: 0.22 sec  LV V1 max P.1 mmHg  LV V1 max: 133.5 cm/sec  LV V1 VTI: 28.8 cm  SV(LVOT): 97.9 ml  SI(LVOT): 39.8 ml/m2  PA acc time: 0.09 sec     ______________________________________________________________________________  Report approved by: Dawood Lainez MD on 2025 01:26 PM

## 2025-04-02 ENCOUNTER — ORDERS ONLY (AUTO-RELEASED) (OUTPATIENT)
Dept: FAMILY MEDICINE | Facility: CLINIC | Age: 65
End: 2025-04-02

## 2025-04-02 ENCOUNTER — OFFICE VISIT (OUTPATIENT)
Dept: FAMILY MEDICINE | Facility: CLINIC | Age: 65
End: 2025-04-02
Attending: FAMILY MEDICINE
Payer: COMMERCIAL

## 2025-04-02 VITALS
OXYGEN SATURATION: 100 % | TEMPERATURE: 97.3 F | SYSTOLIC BLOOD PRESSURE: 142 MMHG | HEIGHT: 73 IN | RESPIRATION RATE: 18 BRPM | BODY MASS INDEX: 35.15 KG/M2 | DIASTOLIC BLOOD PRESSURE: 96 MMHG | HEART RATE: 72 BPM | WEIGHT: 265.2 LBS

## 2025-04-02 DIAGNOSIS — Z12.11 SCREEN FOR COLON CANCER: ICD-10-CM

## 2025-04-02 DIAGNOSIS — Z12.5 SCREENING FOR PROSTATE CANCER: ICD-10-CM

## 2025-04-02 DIAGNOSIS — R55 SYNCOPE AND COLLAPSE: ICD-10-CM

## 2025-04-02 DIAGNOSIS — Z86.0100 HISTORY OF COLONIC POLYPS: ICD-10-CM

## 2025-04-02 DIAGNOSIS — I10 HYPERTENSION GOAL BP (BLOOD PRESSURE) < 140/90: ICD-10-CM

## 2025-04-02 DIAGNOSIS — M1A.09X0 IDIOPATHIC CHRONIC GOUT OF MULTIPLE SITES WITHOUT TOPHUS: ICD-10-CM

## 2025-04-02 DIAGNOSIS — Z51.81 MEDICATION MONITORING ENCOUNTER: ICD-10-CM

## 2025-04-02 DIAGNOSIS — E66.811 CLASS 1 OBESITY WITH SERIOUS COMORBIDITY AND BODY MASS INDEX (BMI) OF 34.0 TO 34.9 IN ADULT, UNSPECIFIED OBESITY TYPE: ICD-10-CM

## 2025-04-02 DIAGNOSIS — Z00.00 MEDICARE ANNUAL WELLNESS VISIT, SUBSEQUENT: ICD-10-CM

## 2025-04-02 DIAGNOSIS — G25.81 RESTLESS LEGS SYNDROME (RLS): ICD-10-CM

## 2025-04-02 DIAGNOSIS — E78.5 HYPERLIPIDEMIA LDL GOAL <130: ICD-10-CM

## 2025-04-02 DIAGNOSIS — Z00.00 ROUTINE GENERAL MEDICAL EXAMINATION AT A HEALTH CARE FACILITY: Primary | ICD-10-CM

## 2025-04-02 DIAGNOSIS — M54.50 CHRONIC LEFT-SIDED LOW BACK PAIN WITHOUT SCIATICA: ICD-10-CM

## 2025-04-02 DIAGNOSIS — G89.29 CHRONIC LEFT-SIDED LOW BACK PAIN WITHOUT SCIATICA: ICD-10-CM

## 2025-04-02 DIAGNOSIS — Z00.00 ROUTINE GENERAL MEDICAL EXAMINATION AT A HEALTH CARE FACILITY: ICD-10-CM

## 2025-04-02 DIAGNOSIS — I46.9 CARDIAC ARREST WITH SUCCESSFUL RESUSCITATION (H): ICD-10-CM

## 2025-04-02 DIAGNOSIS — M15.0 PRIMARY OSTEOARTHRITIS INVOLVING MULTIPLE JOINTS: ICD-10-CM

## 2025-04-02 RX ORDER — ALLOPURINOL 300 MG/1
1 TABLET ORAL DAILY
Qty: 90 TABLET | Refills: 3 | Status: SHIPPED | OUTPATIENT
Start: 2025-04-02

## 2025-04-02 RX ORDER — ATORVASTATIN CALCIUM 40 MG/1
40 TABLET, FILM COATED ORAL DAILY
Qty: 90 TABLET | Refills: 3 | Status: SHIPPED | OUTPATIENT
Start: 2025-04-02

## 2025-04-02 RX ORDER — AMLODIPINE BESYLATE 5 MG/1
5 TABLET ORAL DAILY
Qty: 90 TABLET | Refills: 3 | Status: SHIPPED | OUTPATIENT
Start: 2025-04-02

## 2025-04-02 RX ORDER — ALLOPURINOL 100 MG/1
TABLET ORAL
Qty: 90 TABLET | Refills: 3 | Status: SHIPPED | OUTPATIENT
Start: 2025-04-02

## 2025-04-02 NOTE — PROGRESS NOTES
Preventive Care Visit  [facility] [place]  Kal Briceño MD, Family Medicine  Apr 2, 2025      Assessment & Plan     Routine general medical examination at a health care facility    - Comprehensive metabolic panel  - Lipid panel reflex to direct LDL Fasting  - CBC with platelets  - CK total  - UA Macroscopic with reflex to Microscopic and Culture  - Albumin Random Urine Quantitative with Creat Ratio  - TSH with free T4 reflex  - Prostate Specific Antigen Screen  - Fecal colorectal cancer screen FIT  - Uric acid  - Ferritin  - PRIMARY CARE FOLLOW-UP SCHEDULING  - REVIEW OF HEALTH MAINTENANCE PROTOCOL ORDERS  - PRIMARY CARE FOLLOW-UP SCHEDULING    Medicare annual wellness visit, subsequent    - Comprehensive metabolic panel  - Lipid panel reflex to direct LDL Fasting  - CBC with platelets  - CK total  - UA Macroscopic with reflex to Microscopic and Culture  - Albumin Random Urine Quantitative with Creat Ratio  - TSH with free T4 reflex  - Prostate Specific Antigen Screen  - Fecal colorectal cancer screen FIT  - Uric acid  - Ferritin  - PRIMARY CARE FOLLOW-UP SCHEDULING  - REVIEW OF HEALTH MAINTENANCE PROTOCOL ORDERS  - PRIMARY CARE FOLLOW-UP SCHEDULING    Cardiac arrest with successful resuscitation (H)    - Comprehensive metabolic panel  - Lipid panel reflex to direct LDL Fasting  - UA Macroscopic with reflex to Microscopic and Culture  - Albumin Random Urine Quantitative with Creat Ratio  - PRIMARY CARE FOLLOW-UP SCHEDULING  - REVIEW OF HEALTH MAINTENANCE PROTOCOL ORDERS  - atorvastatin (LIPITOR) 40 MG tablet  Dispense: 90 tablet; Refill: 3  - PRIMARY CARE FOLLOW-UP SCHEDULING    Syncope and collapse    - Comprehensive metabolic panel  - CBC with platelets  - UA Macroscopic with reflex to Microscopic and Culture  - Albumin Random Urine Quantitative with Creat Ratio  - PRIMARY CARE FOLLOW-UP SCHEDULING  - REVIEW OF HEALTH MAINTENANCE PROTOCOL ORDERS  - atorvastatin (LIPITOR) 40 MG tablet  Dispense: 90  tablet; Refill: 3  - PRIMARY CARE FOLLOW-UP SCHEDULING    Hypertension goal BP (blood pressure) < 140/90    - Comprehensive metabolic panel  - UA Macroscopic with reflex to Microscopic and Culture  - Albumin Random Urine Quantitative with Creat Ratio  - PRIMARY CARE FOLLOW-UP SCHEDULING  - REVIEW OF HEALTH MAINTENANCE PROTOCOL ORDERS  - amLODIPine (NORVASC) 5 MG tablet  Dispense: 90 tablet; Refill: 3  - PRIMARY CARE FOLLOW-UP SCHEDULING    Hyperlipidemia LDL goal <130    - Comprehensive metabolic panel  - Lipid panel reflex to direct LDL Fasting  - CK total  - PRIMARY CARE FOLLOW-UP SCHEDULING  - REVIEW OF HEALTH MAINTENANCE PROTOCOL ORDERS  - atorvastatin (LIPITOR) 40 MG tablet  Dispense: 90 tablet; Refill: 3  - PRIMARY CARE FOLLOW-UP SCHEDULING    Idiopathic chronic gout of 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    - Uric acid  - PRIMARY CARE FOLLOW-UP SCHEDULING  - REVIEW OF HEALTH MAINTENANCE PROTOCOL ORDERS  - allopurinol (ZYLOPRIM) 100 MG tablet  Dispense: 90 tablet; Refill: 3  - allopurinol (ZYLOPRIM) 300 MG tablet  Dispense: 90 tablet; Refill: 3  - PRIMARY CARE FOLLOW-UP SCHEDULING    Chronic left-sided low back pain without sciatica    - PRIMARY CARE FOLLOW-UP SCHEDULING  - REVIEW OF HEALTH MAINTENANCE PROTOCOL ORDERS  - PRIMARY CARE FOLLOW-UP SCHEDULING    Primary osteoarthritis involving multiple joints    - PRIMARY CARE FOLLOW-UP SCHEDULING  - REVIEW OF HEALTH MAINTENANCE PROTOCOL ORDERS  - PRIMARY CARE FOLLOW-UP SCHEDULING    Restless legs syndrome (RLS)    - Ferritin  - PRIMARY CARE FOLLOW-UP SCHEDULING  - REVIEW OF HEALTH MAINTENANCE PROTOCOL ORDERS  - PRIMARY CARE FOLLOW-UP SCHEDULING    History of colonic polyps    - Fecal colorectal cancer screen FIT  - PRIMARY CARE FOLLOW-UP SCHEDULING  - REVIEW OF HEALTH MAINTENANCE PROTOCOL ORDERS  - PRIMARY CARE FOLLOW-UP SCHEDULING    Screen for colon cancer    - Fecal colorectal cancer screen FIT  - PRIMARY CARE FOLLOW-UP SCHEDULING  - REVIEW OF HEALTH MAINTENANCE  PROTOCOL ORDERS  - PRIMARY CARE FOLLOW-UP SCHEDULING    Screening for prostate cancer    - Prostate Specific Antigen Screen  - PRIMARY CARE FOLLOW-UP SCHEDULING  - REVIEW OF HEALTH MAINTENANCE PROTOCOL ORDERS  - PRIMARY CARE FOLLOW-UP SCHEDULING    Class 1 obesity with serious comorbidity and body mass index (BMI) of 34.0 to 34.9 in adult, unspecified obesity type    - PRIMARY CARE FOLLOW-UP SCHEDULING  - REVIEW OF HEALTH MAINTENANCE PROTOCOL ORDERS  - PRIMARY CARE FOLLOW-UP SCHEDULING    Medication monitoring encounter    - Comprehensive metabolic panel  - Lipid panel reflex to direct LDL Fasting  - CBC with platelets  - CK total  - UA Macroscopic with reflex to Microscopic and Culture  - Albumin Random Urine Quantitative with Creat Ratio  - TSH with free T4 reflex  - Prostate Specific Antigen Screen  - Fecal colorectal cancer screen FIT  - Uric acid  - Ferritin  - PRIMARY CARE FOLLOW-UP SCHEDULING  - REVIEW OF HEALTH MAINTENANCE PROTOCOL ORDERS  - PRIMARY CARE FOLLOW-UP SCHEDULING    Patient has been advised of split billing requirements and indicates understanding: Yes    Counseling  Appropriate preventive services were addressed with this patient via screening, questionnaire, or discussion as appropriate for fall prevention, nutrition, physical activity, Tobacco-use cessation, social engagement, weight loss and cognition.  Checklist reviewing preventive services available has been given to the patient.  Reviewed patient's diet, addressing concerns and/or questions.   He is at risk for lack of exercise and has been provided with information to increase physical activity for the benefit of his well-being.   Discussed possible causes of fatigue. The patient was provided with written information regarding signs of hearing loss.     Work on weight loss  Regular exercise    Plan:    1) Medications: reviewed, refilled    2) Labs: pending    3) Immunizations: advised RSV, covid, flu    4) Imaging/Diagnostics: Stress  echo, event monitor    5) Consults: cardiology, Dr Stanton    6) watch BP    Return in about 1 year (around 4/2/2026) for Complete Physical, Medication Recheck Visit, Follow Up Chronic.    39 minutes spent by myself on the date of the encounter doing chart review, history and exam, documentation and further activities per the note.    The longitudinal plan of care for the diagnosis(es)/condition(s) as documented were addressed during this visit. Due to the added complexity in care, I will continue to support Pat in the subsequent management and with ongoing continuity of care.    Shared Decision making completed.    Pushpa Rodriguez is a 65 year old, presenting for the following:  Physical        4/2/2025     2:11 PM   Additional Questions   Roomed by Elza GARCIA CMA      HPI  Patient is not fasting.     Recent Cardiac arrest / Syncopal event while on vacation in [facility] - see recent extensive work up - negative to date - Dr Stanton    Hyperlipidemia Follow-Up    Are you regularly taking any medication or supplement to lower your cholesterol?   Yes- atorvastatin  Are you having muscle aches or other side effects that you think could be caused by your cholesterol lowering medication?  No    Recent Labs   Lab Test 04/01/24  1420 03/29/23  0821   CHOL 236* 229*   HDL 53 51   * 143*   TRIG 178* 173*     Hypertension Follow-up    Do you check your blood pressure regularly outside of the clinic? Yes   Are you following a low salt diet? Yes  Are your blood pressures ever more than 140 on the top number (systolic) OR more   than 90 on the bottom number (diastolic), for example 140/90? Yes    BP Readings from Last 3 Encounters:   04/02/25 (!) 142/96   04/01/25 (!) 148/90   02/24/25 (!) 132/98     Creatinine   Date Value Ref Range Status   02/24/2025 1.13 0.67 - 1.17 mg/dL Final   11/19/2020 1.06 0.66 - 1.25 mg/dL Final     GFR Estimate   Date Value Ref Range Status   02/24/2025 72 >60 mL/min/1.73m2 Final     Comment:     eGFR  calculated using 2021 CKD-EPI equation.   11/19/2020 75 >60 mL/min/[1.73_m2] Final     Comment:     Non  GFR Calc  Starting 12/18/2018, serum creatinine based estimated GFR (eGFR) will be   calculated using the Chronic Kidney Disease Epidemiology Collaboration   (CKD-EPI) equation.       Gout - no issues on allopurinol    Chronic LBP / OA    RLS - on requip    Advance Care Planning  Patient has a Health Care Directive on file  Advance care planning document is on file and is current.      4/1/2025   General Health   How would you rate your overall physical health? Good   Feel stress (tense, anxious, or unable to sleep) Only a little   (!) STRESS CONCERN      4/1/2025   Nutrition   Diet: Low salt         4/1/2025   Exercise   Days per week of moderate/strenous exercise 3 days   Average minutes spent exercising at this level 20 min         4/1/2025   Social Factors   Frequency of gathering with friends or relatives Once a week   Worry food won't last until get money to buy more No   Food not last or not have enough money for food? No   Do you have housing? (Housing is defined as stable permanent housing and does not include staying ouside in a car, in a tent, in an abandoned building, in an overnight shelter, or couch-surfing.) Yes   Are you worried about losing your housing? No   Lack of transportation? No   Unable to get utilities (heat,electricity)? No         4/1/2025   Fall Risk   Fallen 2 or more times in the past year? No   Trouble with walking or balance? No          4/1/2025   Activities of Daily Living- Home Safety   Needs help with the following daily activites None of the above   Safety concerns in the home None of the above         4/1/2025   Dental   Dentist two times every year? Yes         4/1/2025   Hearing Screening   Hearing concerns? (!) I NEED TO ASK PEOPLE TO SPEAK UP OR REPEAT THEMSELVES.         4/1/2025   Driving Risk Screening   Patient/family members have concerns about  driving No         2025   General Alertness/Fatigue Screening   Have you been more tired than usual lately? (!) YES         2025   Urinary Incontinence Screening   Bothered by leaking urine in past 6 months No           3/25/2024   TB Screening   Were you born outside of the [place]? No         Today's PHQ-9 Score:       2025    11:58 AM   PHQ-9 SCORE   PHQ-9 Total Score MyChart 2 (Minimal depression)   PHQ-9 Total Score 2        Patient-reported         2025   Substance Use   Alcohol more than 3/day or more than 7/wk No   Do you have a current opioid prescription? No   How severe/bad is pain from 1 to 10? 1/10   Do you use any other substances recreationally? No     Social History     Tobacco Use    Smoking status: Never    Smokeless tobacco: Never   Vaping Use    Vaping status: Never Used   Substance Use Topics    Alcohol use: Yes     Alcohol/week: 4.0 - 6.0 standard drinks of alcohol     Types: 4 - 6 Standard drinks or equivalent per week    Drug use: Never           2025   AAA Screening   Family history of Abdominal Aortic Aneurysm (AAA)? No   Last PSA:   PSA   Date Value Ref Range Status   2020 0.49 0 - 4 ug/L Final     Comment:     Assay Method:  Chemiluminescence using Siemens Vista analyzer     Prostate Specific Antigen Screen   Date Value Ref Range Status   2024 0.58 0.00 - 4.50 ng/mL Final   2022 0.50 0.00 - 4.00 ug/L Final     ASCVD Risk   The 10-year ASCVD risk score (Binh LILLY, et al., 2019) is: 20.2%    Values used to calculate the score:      Age: 65 years      Sex: Male      Is Non- : No      Diabetic: No      Tobacco smoker: No      Systolic Blood Pressure: 142 mmHg      Is BP treated: Yes      HDL Cholesterol: 53 mg/dL      Total Cholesterol: 236 mg/dL    Fracture Risk Assessment Tool  Link to Frax Calculator  Use the information below to complete the Frax calculator  : 1960  Sex: male  Weight (kg): 120.3 kg (actual  weight)  Height (cm): 185.4 cm  Previous Fragility Fracture:  No  History of parent with fractured hip:  No  Current Smoking:  No  Patient has been on glucocorticoids for more than 3 months (5mg/day or more): No  Rheumatoid Arthritis on Problem List:  No  Secondary Osteoporosis on Problem List:  No  Consumes 3 or more units of alcohol per day: No  Femoral Neck BMD (g/cm2)            Reviewed and updated as needed this visit by Provider                  Current providers sharing in care for this patient include:  Patient Care Team:  Kal Briceño MD as PCP - General (Family Medicine)  Kal Briceño MD as Assigned PCP  Sean Kenney DO as Assigned Musculoskeletal Provider  Ace Stanton MD as MD (Cardiovascular Disease)    The following health maintenance items are reviewed in Epic and correct as of today:  Health Maintenance   Topic Date Due    RSV VACCINE (1 - Risk 60-74 years 1-dose series) Never done    INFLUENZA VACCINE (1) 09/01/2024    COVID-19 Vaccine (5 - 2024-25 season) 09/01/2024    LIPID  04/01/2025    URIC ACID  04/01/2025    BMP  02/24/2026    MEDICARE ANNUAL WELLNESS VISIT  04/02/2026    ANNUAL REVIEW OF HM ORDERS  04/02/2026    FALL RISK ASSESSMENT  04/02/2026    DIABETES SCREENING  02/24/2028    ADVANCE CARE PLANNING  04/01/2029    COLORECTAL CANCER SCREENING  04/30/2029    DTAP/TDAP/TD IMMUNIZATION (5 - Td or Tdap) 10/28/2029    PHQ-2 (once per calendar year)  Completed    Pneumococcal Vaccine: 50+ Years  Completed    ZOSTER IMMUNIZATION  Completed    HEPATITIS C SCREENING  Addressed    HPV IMMUNIZATION  Aged Out    MENINGITIS IMMUNIZATION  Aged Out     Patient Active Problem List   Diagnosis    Hypertension goal BP (blood pressure) < 140/90    Hyperlipidemia LDL goal <130    Idiopathic chronic gout of 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    Restless legs syndrome (RLS)    History of colonic polyps    Chronic left-sided low back pain without sciatica    Class 1 obesity with serious comorbidity and body mass  index (BMI) of 34.0 to 34.9 in adult, unspecified obesity type    Primary osteoarthritis involving multiple joints       Past Medical History:   Diagnosis Date    History of colonic polyps 01/2019 4/2024 - 3 mm polyp - hyperplastic - due 5 yrs    Hyperlipidemia LDL goal <130     Hypertension goal BP (blood pressure) < 140/90     Idiopathic chronic gout of 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     Restless legs syndrome (RLS)        Past Surgical History:   Procedure Laterality Date    APPENDECTOMY  2008    COLONOSCOPY  01/2019    colon polyp, due 5 yrs    COLONOSCOPY N/A 04/30/2024    3 mm hyperplastic polyp - due 5 yrs       Current Outpatient Medications   Medication Sig Dispense Refill    allopurinol (ZYLOPRIM) 100 MG tablet TAKE 1 TABLET BY MOUTH DAILY ALONG WITH 300MG TAB 90 tablet 3    allopurinol (ZYLOPRIM) 300 MG tablet Take 1 tablet (300 mg) by mouth daily. 90 tablet 3    amLODIPine (NORVASC) 5 MG tablet Take 1 tablet (5 mg) by mouth daily. 90 tablet 3    atorvastatin (LIPITOR) 40 MG tablet Take 1 tablet (40 mg) by mouth daily. 90 tablet 3    lisinopril (ZESTRIL) 30 MG tablet Take 1 tablet (30 mg) by mouth daily. 90 tablet 3    rOPINIRole (REQUIP) 1 MG tablet Take 1 tablet (1 mg) by mouth every morning 90 tablet 3    rOPINIRole (REQUIP) 3 MG tablet TAKE ONE TABLET BY MOUTH ONE TIME DAILY IN THE EVENING 90 tablet 0       No Known Allergies    Family History   Problem Relation Age of Onset    Arthritis Mother     Heart Disease Father     Hypertension Father     Cancer Brother         Lungs, x 3 total    Colon Cancer No family hx of        Social History     Socioeconomic History    Marital status: Single     Spouse name: OBEY Villafuerte    Number of children: 2    Years of education: 14    Highest education level: None   Tobacco Use    Smoking status: Never    Smokeless tobacco: Never   Vaping Use    Vaping status: Never Used   Substance and Sexual Activity    Alcohol use: Yes     Alcohol/week: 4.0 - 6.0 standard  drinks of alcohol     Types: 4 - 6 Standard drinks or equivalent per week    Drug use: Never    Sexual activity: Yes     Partners: Female     Birth control/protection: None     Social Drivers of Health     Financial Resource Strain: Low Risk  (4/1/2025)    Financial Resource Strain     Within the past 12 months, have you or your family members you live with been unable to get utilities (heat, electricity) when it was really needed?: No   Food Insecurity: Low Risk  (4/1/2025)    Food Insecurity     Within the past 12 months, did you worry that your food would run out before you got money to buy more?: No     Within the past 12 months, did the food you bought just not last and you didn t have money to get more?: No   Transportation Needs: Low Risk  (4/1/2025)    Transportation Needs     Within the past 12 months, has lack of transportation kept you from medical appointments, getting your medicines, non-medical meetings or appointments, work, or from getting things that you need?: No   Physical Activity: Insufficiently Active (4/1/2025)    Exercise Vital Sign     Days of Exercise per Week: 3 days     Minutes of Exercise per Session: 20 min   Stress: No Stress Concern Present (4/1/2025)    [place] [employer] - Occupational Stress Questionnaire     Feeling of Stress : Only a little   Social Connections: Unknown (4/1/2025)    Social Connection and Isolation Panel [NHANES]     Frequency of Social Gatherings with Friends and Family: Once a week   Interpersonal Safety: Low Risk  (4/2/2025)    Interpersonal Safety     Do you feel physically and emotionally safe where you currently live?: Yes     Within the past 12 months, have you been hit, slapped, kicked or otherwise physically hurt by someone?: No     Within the past 12 months, have you been humiliated or emotionally abused in other ways by your partner or ex-partner?: No   Housing Stability: Low Risk  (4/1/2025)    Housing Stability     Do you have  "housing? : Yes     Are you worried about losing your housing?: No       Colonoscopy:  due 4/2029  FIT / Cologuard: ordered  PSA: pending      Review of Systems  CONSTITUTIONAL: NEGATIVE for fever, chills, change in weight  INTEGUMENTARY/SKIN: NEGATIVE for worrisome rashes, moles or lesions  EYES: NEGATIVE for vision changes or irritation  ENT/MOUTH: NEGATIVE for ear, mouth and throat problems  RESP: NEGATIVE for significant cough or SOB  CV: NEGATIVE for chest pain, palpitations or peripheral edema  GI: NEGATIVE for nausea, abdominal pain, heartburn, or change in bowel habits  : NEGATIVE for frequency, dysuria, or hematuria  MUSCULOSKELETAL: NEGATIVE for significant arthralgias or myalgia  NEURO: NEGATIVE for weakness, dizziness or paresthesias  ENDOCRINE: NEGATIVE for temperature intolerance, skin/hair changes  HEME: NEGATIVE for bleeding problems  PSYCHIATRIC: NEGATIVE for changes in mood or affect     Objective    Exam  BP (!) 142/96   Pulse 72   Temp 97.3  F (36.3  C) (Tympanic)   Resp 18   Ht 1.854 m (6' 1\")   Wt 120.3 kg (265 lb 3.2 oz)   SpO2 100%   BMI 34.99 kg/m     Estimated body mass index is 34.99 kg/m  as calculated from the following:    Height as of this encounter: 1.854 m (6' 1\").    Weight as of this encounter: 120.3 kg (265 lb 3.2 oz).    Physical Exam  GENERAL: alert and no distress  EYES: Eyes grossly normal to inspection, PERRL and conjunctivae and sclerae normal  HENT: ear canals and TM's normal, nose and mouth without ulcers or lesions  NECK: no adenopathy, no asymmetry, masses, or scars  RESP: lungs clear to auscultation - no rales, rhonchi or wheezes  CV: regular rate and rhythm, normal S1 S2, no S3 or S4, no murmur, click or rub, no peripheral edema  ABDOMEN: soft, nontender, no hepatosplenomegaly, no masses and bowel sounds normal   (male): pt declines  RECTAL: pt declines  MS: no gross musculoskeletal defects noted, no edema  SKIN: no suspicious lesions or rashes  NEURO: Normal " strength and tone, mentation intact and speech normal  PSYCH: mentation appears normal, affect normal/bright        4/2/2025   Mini Cog   Clock Draw Score 2 Normal   3 Item Recall 3 objects recalled   Mini Cog Total Score 5         Signed Electronically by:            Kal Briceño MD, FAAFP, [facility]  [facility]  [address] [place] [id number]  [email or web address]   Office: [phone]  Fax: [phone]       Answers submitted by the patient for this visit:  Patient Health Questionnaire (Submitted on 4/1/2025)  If you checked off any problems, how difficult have these problems made it for you to do your work, take care of things at home, or get along with other people?: Not difficult at all  PHQ9 TOTAL SCORE: 2  Patient Health Questionnaire (G7) (Submitted on 4/1/2025)  MARTY 7 TOTAL SCORE: 2

## 2025-04-08 ENCOUNTER — HOSPITAL ENCOUNTER (OUTPATIENT)
Dept: CARDIOLOGY | Facility: CLINIC | Age: 65
Discharge: HOME | End: 2025-04-08
Attending: INTERNAL MEDICINE
Payer: COMMERCIAL

## 2025-04-08 DIAGNOSIS — R55 SYNCOPE AND COLLAPSE: ICD-10-CM

## 2025-04-08 DIAGNOSIS — I46.9 CARDIAC ARREST WITH SUCCESSFUL RESUSCITATION (H): ICD-10-CM

## 2025-04-08 PROCEDURE — 255N000002 HC RX 255 OP 636: Performed by: INTERNAL MEDICINE

## 2025-04-08 PROCEDURE — 93270 REMOTE 30 DAY ECG REV/REPORT: CPT

## 2025-04-08 PROCEDURE — 999N000208 ECHO STRESS ECHOCARDIOGRAM

## 2025-04-08 RX ADMIN — HUMAN ALBUMIN MICROSPHERES AND PERFLUTREN 3 ML: 10; .22 INJECTION, SOLUTION INTRAVENOUS at 10:00

## 2025-04-09 ENCOUNTER — TELEPHONE (OUTPATIENT)
Dept: CARDIOLOGY | Facility: CLINIC | Age: 65
End: 2025-04-09
Payer: COMMERCIAL

## 2025-04-09 DIAGNOSIS — R94.39 ABNORMAL CARDIOVASCULAR STRESS TEST: Primary | ICD-10-CM

## 2025-04-09 DIAGNOSIS — I10 HYPERTENSION GOAL BP (BLOOD PRESSURE) < 140/90: ICD-10-CM

## 2025-04-09 RX ORDER — CARVEDILOL 6.25 MG/1
6.25 TABLET ORAL 2 TIMES DAILY WITH MEALS
Qty: 180 TABLET | Refills: 3 | Status: SHIPPED | OUTPATIENT
Start: 2025-04-09

## 2025-04-09 NOTE — RESULT ENCOUNTER NOTE
Results reviewed, I concur with reported findings, stress test is equivocal there were some findings that were abnormal post stress, however this could be attributed to hypertensive response.  Recommend starting carvedilol 6.25 mg twice daily.  He should monitor his blood pressures at home, goal BP less than 130/80 mmHg, also should monitor for possible hypotension and worsening dizziness/lightheadedness with this new medication.  We already increased the lisinopril to 30 mg daily.    I concur with further assessment with a coronary CTA, indication: Abnormal cardiac stress test thanks

## 2025-04-09 NOTE — TELEPHONE ENCOUNTER
Regarding results of stress test:  ----- Message from Ace Stanton sent at 4/9/2025  8:24 AM CDT -----  Results reviewed, I concur with reported findings, stress test is equivocal there were some findings that were abnormal post stress, however this could be attributed to hypertensive response.  Recommend starting carvedilol 6.25 mg twice daily.  He should monitor his blood pressures at home, goal BP less than 130/80 mmHg, also should monitor for possible hypotension and worsening dizziness/lightheadedness with this new medication.  We already increased the lisinopril to 30 mg daily.    I concur with further assessment with a coronary CTA, indication: Abnormal cardiac stress test thanks    =============================================    Patient called to inform that Dr. Stanton has reviewed results of stress test and of above interpretation and recommendation. Patient agreeable to plan and verbalizes understanding. Reports he had 30 day cardiac monitor placed yesterday. CT department called to ask if he can have this on for procedure or not. Awaiting return call to schedule and update patient.       UPDATE 1153: Received message from Neelam in CT department reporting a coronary CTA can be done with event monitor, however there may be artifact in the images. Dr. Stanton messaged to ask if okay to wait 30 days for Coronary CTA,.

## 2025-04-10 ENCOUNTER — MYC MEDICAL ADVICE (OUTPATIENT)
Dept: CARDIOLOGY | Facility: CLINIC | Age: 65
End: 2025-04-10
Payer: COMMERCIAL

## 2025-04-10 RX ORDER — ASPIRIN 81 MG/1
81 TABLET ORAL DAILY
Status: SHIPPED
Start: 2025-04-10

## 2025-04-10 NOTE — TELEPHONE ENCOUNTER
Ace Stanton MD  You; [facility] 224 minutes ago (7:59 AM)     Also should start aspirin 81 mg daily for now thanks      Ace Stanton MD  You; [facility] 224 minutes ago (7:59 AM)     Agree reasonable to wait on the CTA but monitor for signs/symptoms of angina, avoid strenuous physical activity/exercise until then thanks      You  Ace Stanton MD; [facility] 238 minutes ago (7:45 AM)     It sounds like it cannot be taken off and reapplied. The artifact could possible show up on the CTA, but case by case.    Turns out we are booked through mid-late May for CTAs unless ordered stat. Okay to wait?    Please advise. Thank you!      Ace Stanton MD  You; [facility] 217 hours ago (3:20 PM)     Can it be replaced after the CTA?      You  Ace Stanton MD; [facility] 218 hours ago (1:41 PM)     30 day event monitor just placed yesterday, are you okay waiting for the coronary CTA after removal? Can be done, but may show artifact.    Please advise and thank you!     ===========================================================    Patient called to inform that scheduling will reach out for next available Coronary CTA spot in May after 30 day monitor is removed. Message sent to scheduling. Patient instructed to begin aspirin 81 mg daily and reports he has already picked this up OTC and has begun after consult with Dr. Stanton on 4/1/25.     Patient is skeptical of need for coreg and Coronary CTA. Stress test results and Dr. Stanton's recommendations explained in great detail. All questions answered. Patient is agreeable to plan.

## 2025-04-11 ENCOUNTER — LAB (OUTPATIENT)
Dept: LAB | Facility: CLINIC | Age: 65
End: 2025-04-11
Payer: COMMERCIAL

## 2025-04-11 DIAGNOSIS — Z13.1 SCREENING FOR DIABETES MELLITUS: Primary | ICD-10-CM

## 2025-04-16 ENCOUNTER — DOCUMENTATION ONLY (OUTPATIENT)
Dept: CARDIOLOGY | Facility: CLINIC | Age: 65
End: 2025-04-16

## 2025-04-16 ENCOUNTER — LAB (OUTPATIENT)
Dept: LAB | Facility: CLINIC | Age: 65
End: 2025-04-16
Payer: COMMERCIAL

## 2025-04-16 ENCOUNTER — ALLIED HEALTH/NURSE VISIT (OUTPATIENT)
Dept: CARDIOLOGY | Facility: CLINIC | Age: 65
End: 2025-04-16
Payer: COMMERCIAL

## 2025-04-16 VITALS — SYSTOLIC BLOOD PRESSURE: 158 MMHG | HEART RATE: 63 BPM | DIASTOLIC BLOOD PRESSURE: 90 MMHG

## 2025-04-16 DIAGNOSIS — I10 HYPERTENSION GOAL BP (BLOOD PRESSURE) < 140/90: ICD-10-CM

## 2025-04-16 LAB
ANION GAP SERPL CALCULATED.3IONS-SCNC: 10 MMOL/L (ref 7–15)
BUN SERPL-MCNC: 18.2 MG/DL (ref 8–23)
CALCIUM SERPL-MCNC: 9.2 MG/DL (ref 8.8–10.4)
CHLORIDE SERPL-SCNC: 103 MMOL/L (ref 98–107)
CREAT SERPL-MCNC: 1.07 MG/DL (ref 0.67–1.17)
EGFRCR SERPLBLD CKD-EPI 2021: 77 ML/MIN/1.73M2
GLUCOSE SERPL-MCNC: 91 MG/DL (ref 70–99)
HCO3 SERPL-SCNC: 24 MMOL/L (ref 22–29)
POTASSIUM SERPL-SCNC: 4.2 MMOL/L (ref 3.4–5.3)
SODIUM SERPL-SCNC: 137 MMOL/L (ref 135–145)

## 2025-04-16 NOTE — PROGRESS NOTES
[facility] BLOOD PRESSURE CHECK     Last office visit: 4/1/25    Previous blood pressure: 142/96 mm Hg  Previous heart rate: 72 bpm      Time of visit: 200 pm    Morning medications were taken at: 445 am     Today's blood pressure: 158/90 mm Hg  Today's heart rate: 63 bpm     Home monitor blood pressure: 151/99 mmHg  Home monitor heart rate:  bpm      Additional Comments: pt says his bp has been running higher since starting new medications. Typically bp is in the 170s -180s/ 112s      Results routed to: team 2      Ordering Provider: Dr Stanton  In clinic Provider: Dr Benz

## 2025-04-21 ENCOUNTER — TELEPHONE (OUTPATIENT)
Dept: CARDIOLOGY | Facility: CLINIC | Age: 65
End: 2025-04-21
Payer: COMMERCIAL

## 2025-04-21 DIAGNOSIS — I10 HYPERTENSION GOAL BP (BLOOD PRESSURE) < 140/90: Primary | ICD-10-CM

## 2025-04-21 NOTE — TELEPHONE ENCOUNTER
Regarding in-clinic BP check and lab results:  ----- Message from Ace Stanton sent at 4/21/2025  7:36 AM CDT -----  Recommend changing lisinopril to olmesartan 40 mg daily with BMP in 2 weeks thanks     ============================    Patient called to inform that Dr. Stanton has reviewed results of in-clinic BP check and labs and above recommendation. No answer. Voicemail left asking patient to please return call to team 2 nurse line to discuss.    Rianna Gomes, RN  [facility]  [phone]

## 2025-04-22 RX ORDER — OLMESARTAN MEDOXOMIL 40 MG/1
40 TABLET ORAL DAILY
Qty: 90 TABLET | Refills: 3 | Status: SHIPPED | OUTPATIENT
Start: 2025-04-22

## 2025-04-22 NOTE — TELEPHONE ENCOUNTER
Spoke with patient to review Dr. Stanton's recommendation to change from lisinopril to olmesartan 40mg daily. He is willing to make that change. Rx escripted.  Order placed for bmp in 2 weeks.  Patient will call the [facility] to schedule.    Patient will monitor BP at home and send some readings in 2 weeks.

## 2025-05-15 ENCOUNTER — RESULTS FOLLOW-UP (OUTPATIENT)
Dept: CARDIOLOGY | Facility: CLINIC | Age: 65
End: 2025-05-15

## 2025-05-16 NOTE — RESULT ENCOUNTER NOTE
Results reviewed, no clear culprit seen though he did have a few short runs of NSVT, we started him carvedilol 6.25 mg BID. He is due for follow up BMP. Please call and see that he has that done, also what his BPs are doing at home, if generally >120/80 then recommend increasing carvedilol to 12.5 mg BID thanks

## 2025-05-19 ENCOUNTER — DOCUMENTATION ONLY (OUTPATIENT)
Dept: CARDIOLOGY | Facility: CLINIC | Age: 65
End: 2025-05-19
Payer: COMMERCIAL

## 2025-05-28 ENCOUNTER — HOSPITAL ENCOUNTER (OUTPATIENT)
Dept: CARDIOLOGY | Facility: CLINIC | Age: 65
Discharge: HOME | End: 2025-05-28
Attending: INTERNAL MEDICINE
Payer: COMMERCIAL

## 2025-05-28 ENCOUNTER — RESULTS FOLLOW-UP (OUTPATIENT)
Dept: CARDIOLOGY | Facility: CLINIC | Age: 65
End: 2025-05-28

## 2025-05-28 VITALS — HEART RATE: 57 BPM | SYSTOLIC BLOOD PRESSURE: 154 MMHG | DIASTOLIC BLOOD PRESSURE: 95 MMHG

## 2025-05-28 DIAGNOSIS — R94.39 ABNORMAL CARDIOVASCULAR STRESS TEST: ICD-10-CM

## 2025-05-28 PROCEDURE — 250N000011 HC RX IP 250 OP 636: Performed by: INTERNAL MEDICINE

## 2025-05-28 PROCEDURE — 75574 CT ANGIO HRT W/3D IMAGE: CPT

## 2025-05-28 PROCEDURE — 250N000013 HC RX MED GY IP 250 OP 250 PS 637: Performed by: INTERNAL MEDICINE

## 2025-05-28 PROCEDURE — 250N000009 HC RX 250: Performed by: INTERNAL MEDICINE

## 2025-05-28 RX ORDER — METOPROLOL TARTRATE 25 MG/1
25-100 TABLET, FILM COATED ORAL
Status: COMPLETED | OUTPATIENT
Start: 2025-05-28 | End: 2025-05-28

## 2025-05-28 RX ORDER — IVABRADINE 5 MG/1
5-15 TABLET, FILM COATED ORAL
Status: COMPLETED | OUTPATIENT
Start: 2025-05-28 | End: 2025-05-28

## 2025-05-28 RX ORDER — IOPAMIDOL 755 MG/ML
120 INJECTION, SOLUTION INTRAVASCULAR ONCE
Status: COMPLETED | OUTPATIENT
Start: 2025-05-28 | End: 2025-05-28

## 2025-05-28 RX ORDER — DILTIAZEM HCL 60 MG
120 TABLET ORAL
Status: DISCONTINUED | OUTPATIENT
Start: 2025-05-28 | End: 2025-05-29 | Stop reason: HOSPADM

## 2025-05-28 RX ORDER — LIDOCAINE 40 MG/G
CREAM TOPICAL
Status: DISCONTINUED | OUTPATIENT
Start: 2025-05-28 | End: 2025-05-29 | Stop reason: HOSPADM

## 2025-05-28 RX ORDER — NITROGLYCERIN 0.4 MG/1
0.4 TABLET SUBLINGUAL
Status: DISCONTINUED | OUTPATIENT
Start: 2025-05-28 | End: 2025-05-29 | Stop reason: HOSPADM

## 2025-05-28 RX ORDER — DILTIAZEM HYDROCHLORIDE 5 MG/ML
10-15 INJECTION INTRAVENOUS
Status: DISCONTINUED | OUTPATIENT
Start: 2025-05-28 | End: 2025-05-29 | Stop reason: HOSPADM

## 2025-05-28 RX ORDER — METOPROLOL TARTRATE 1 MG/ML
5-20 INJECTION, SOLUTION INTRAVENOUS
Status: DISCONTINUED | OUTPATIENT
Start: 2025-05-28 | End: 2025-05-29 | Stop reason: HOSPADM

## 2025-05-28 RX ORDER — ONDANSETRON 2 MG/ML
4 INJECTION INTRAMUSCULAR; INTRAVENOUS
Status: DISCONTINUED | OUTPATIENT
Start: 2025-05-28 | End: 2025-05-29 | Stop reason: HOSPADM

## 2025-05-28 RX ADMIN — METOPROLOL TARTRATE 10 MG: 5 INJECTION INTRAVENOUS at 12:52

## 2025-05-28 RX ADMIN — METOPROLOL TARTRATE 50 MG: 50 TABLET, FILM COATED ORAL at 11:46

## 2025-05-28 RX ADMIN — NITROGLYCERIN 0.4 MG: 0.4 TABLET SUBLINGUAL at 13:29

## 2025-05-28 RX ADMIN — IVABRADINE 10 MG: 5 TABLET, FILM COATED ORAL at 11:47

## 2025-05-28 RX ADMIN — IOPAMIDOL 120 ML: 755 INJECTION, SOLUTION INTRAVENOUS at 13:52

## 2025-05-28 RX ADMIN — METOPROLOL TARTRATE 10 MG: 5 INJECTION INTRAVENOUS at 12:59

## 2025-05-29 ENCOUNTER — RESULTS FOLLOW-UP (OUTPATIENT)
Dept: CARDIOLOGY | Facility: CLINIC | Age: 65
End: 2025-05-29

## 2025-05-30 ENCOUNTER — TELEPHONE (OUTPATIENT)
Dept: CARDIOLOGY | Facility: CLINIC | Age: 65
End: 2025-05-30
Payer: COMMERCIAL

## 2025-05-30 DIAGNOSIS — R91.8 PULMONARY NODULES: Primary | ICD-10-CM

## 2025-05-30 NOTE — RESULT ENCOUNTER NOTE
Results reviewed, please let the patient know that coronary CTA was negative, normal coronary arteries, calcium score 0, which is extremely favorable.  Etiology of his syncopal episode likely vasovagal.  Follow-up in cardiology clinic as needed

## 2025-05-30 NOTE — RESULT ENCOUNTER NOTE
CT scan did demonstrate pulm nodules up to 7 mm, recommend repeat CT without contrast in 3 months and he should certainly follow-up with PCP for this in the future thanks

## 2025-05-30 NOTE — TELEPHONE ENCOUNTER
Attempted to reach patient to review results and recommendations, left a message to call back to Team 2 at [phone].       Ace Stanton MD  5/30/2025  7:52 AM CDT       Results reviewed, please let the patient know that coronary CTA was negative, normal coronary arteries, calcium score 0, which is extremely favorable.  Etiology of his syncopal episode likely vasovagal.  Follow-up in cardiology clinic as needed     Ace Stanton MD  5/30/2025  7:53 AM CDT       CT scan did demonstrate pulm nodules up to 7 mm, recommend repeat CT without contrast in 3 months and he should certainly follow-up with PCP for this in the future thanks

## 2025-06-02 NOTE — TELEPHONE ENCOUNTER
Second attempt at reaching patient. No answer, voicemail left requesting patient return call to team 2 nurse line to discuss.

## 2025-06-03 PROBLEM — R91.8 PULMONARY NODULES: Status: ACTIVE | Noted: 2025-05-01

## 2025-06-03 NOTE — TELEPHONE ENCOUNTER
Attempt # 1    Called # [phone]     Left a non detailed VM to call back at [phone] and ask for any available Triage Nurse.    Lori Sanchez RN on 6/3/2025 at 3:53 PM   [facility]

## 2025-06-03 NOTE — TELEPHONE ENCOUNTER
3rd attempted to contact patient with CT results. CT scan and radiology view sent to PCP for review.

## 2025-06-04 ENCOUNTER — PATIENT OUTREACH (OUTPATIENT)
Dept: ONCOLOGY | Facility: CLINIC | Age: 65
End: 2025-06-04
Payer: COMMERCIAL

## 2025-06-04 ENCOUNTER — MYC MEDICAL ADVICE (OUTPATIENT)
Dept: FAMILY MEDICINE | Facility: CLINIC | Age: 65
End: 2025-06-04
Payer: COMMERCIAL

## 2025-06-04 DIAGNOSIS — R91.8 PULMONARY NODULES: Primary | ICD-10-CM

## 2025-06-04 NOTE — TELEPHONE ENCOUNTER
Patient called back. Reviewed the CTA cardiac portion of results and Dr. Stanton's review. Patient states his home BP is down to 134/83.    Discussed with patient that his PCP team is trying to reach him to discuss further work up of the incidental findings showing pulmonary nodules.     Patient agreed to call Dr. Briceño's office today.

## 2025-06-04 NOTE — PROGRESS NOTES
New IP (Interventional Pulmonology) referral rec'd.  Chart reviewed.        New Patient: Interventional Pulmonary (Lung nodule) Nurse Navigator Note    [facility]    Referred to (specialty): Interventional Pulmonary (Lung nodule)    Requested provider (if applicable): n/a    Date Referral Received: 6/4/2025    Evaluation for:  lung nodules    Clinical History (per Nurse review of records provided):    **BOOK MARKED**    OVERREAD: DETAILED [place] [facility] EXTRACARDIAC OVERREAD OF CARDIAC CT   LOCATION: [facility]  DATE: 5/28/2025     INDICATION: Abnormal cardiovascular stress test.  TECHNIQUE: Dose reduction techniques were used.  COMPARISON: None.     FINDINGS:    LIMITED CHEST: Scattered nodules at both lung bases measure up to 0.7 cm in the left lower lobe posteriorly (series 15 image 33). Small left lower lobe calcified granulomas.  LIMITED MEDIASTINUM: Calcified left hilar lymph nodes.  LIMITED UPPER ABDOMEN: Unremarkable.  LIMITED MUSCULOSKELETAL: Degenerative changes in the lower thoracic spine.                                                                      IMPRESSION:    1. Scattered pulmonary nodules at both lung bases measure up to 0.7 cm. Please refer to follow-up guidelines below.  2. Please refer to cardiologist's dictation for the cardiovascular findings.     Records Location: [facility]     Records Needed: none    Additional testing needed prior to consult: CT Chest

## 2025-06-05 NOTE — TELEPHONE ENCOUNTER
See 5/30 telephone notes from Dr Stanton, with notes from myself 6/3    Please advise pulmonary consult for pulmonary nodules - Dr Camacho

## 2025-06-05 NOTE — TELEPHONE ENCOUNTER
Called # [phone] and spoke to patient     Advised of pulmonary consult. gave phone number. [phone]; option 5    Patient stated understanding and had no further questions.    Advised patient to call [phone] and ask to speak to a triage nurse with any further questions or concerns.    Lori Sanchez RN on 6/5/2025 at 8:14 AM   [facility]

## 2025-07-16 NOTE — TELEPHONE ENCOUNTER
RECORDS STATUS - ALL OTHER DIAGNOSIS      RECORDS RECEIVED FROM: [facility]   NOTES STATUS DETAILS   OFFICE NOTE from referring provider [facility] Dr. Navarro Bowden   MEDICATION LIST [facility]    LABS     ANYTHING RELATED TO DIAGNOSIS Epic Most recent 4/16/25   IMAGING (NEED IMAGES & REPORT)     CT SCANS PACS 5/28/25: [facility] for Cardio

## 2025-07-31 ENCOUNTER — MYC MEDICAL ADVICE (OUTPATIENT)
Dept: CARDIOLOGY | Facility: CLINIC | Age: 65
End: 2025-07-31
Payer: COMMERCIAL

## 2025-08-06 ENCOUNTER — LAB (OUTPATIENT)
Dept: LAB | Facility: CLINIC | Age: 65
End: 2025-08-06
Payer: COMMERCIAL

## 2025-08-06 DIAGNOSIS — I10 HYPERTENSION GOAL BP (BLOOD PRESSURE) < 140/90: ICD-10-CM

## 2025-08-06 LAB
ANION GAP SERPL CALCULATED.3IONS-SCNC: 11 MMOL/L (ref 7–15)
BUN SERPL-MCNC: 21.6 MG/DL (ref 8–23)
CALCIUM SERPL-MCNC: 8.6 MG/DL (ref 8.8–10.4)
CHLORIDE SERPL-SCNC: 103 MMOL/L (ref 98–107)
CREAT SERPL-MCNC: 1.1 MG/DL (ref 0.67–1.17)
EGFRCR SERPLBLD CKD-EPI 2021: 74 ML/MIN/1.73M2
GLUCOSE SERPL-MCNC: 109 MG/DL (ref 70–99)
HCO3 SERPL-SCNC: 24 MMOL/L (ref 22–29)
POTASSIUM SERPL-SCNC: 4.1 MMOL/L (ref 3.4–5.3)
SODIUM SERPL-SCNC: 138 MMOL/L (ref 135–145)

## 2025-08-06 PROCEDURE — 36415 COLL VENOUS BLD VENIPUNCTURE: CPT

## 2025-08-06 PROCEDURE — 80048 BASIC METABOLIC PNL TOTAL CA: CPT

## 2025-08-18 ENCOUNTER — ONCOLOGY VISIT (OUTPATIENT)
Dept: ONCOLOGY | Facility: CLINIC | Age: 65
End: 2025-08-18
Attending: INTERNAL MEDICINE
Payer: COMMERCIAL

## 2025-08-18 ENCOUNTER — PRE VISIT (OUTPATIENT)
Dept: ONCOLOGY | Facility: CLINIC | Age: 65
End: 2025-08-18
Payer: COMMERCIAL

## 2025-08-18 ENCOUNTER — HOSPITAL ENCOUNTER (OUTPATIENT)
Dept: CT IMAGING | Facility: CLINIC | Age: 65
Discharge: HOME | End: 2025-08-18
Attending: INTERNAL MEDICINE | Admitting: INTERNAL MEDICINE
Payer: COMMERCIAL

## 2025-08-18 VITALS
HEIGHT: 73 IN | OXYGEN SATURATION: 100 % | HEART RATE: 63 BPM | TEMPERATURE: 97.6 F | BODY MASS INDEX: 35.2 KG/M2 | RESPIRATION RATE: 18 BRPM | SYSTOLIC BLOOD PRESSURE: 150 MMHG | DIASTOLIC BLOOD PRESSURE: 96 MMHG | WEIGHT: 265.6 LBS

## 2025-08-18 DIAGNOSIS — R91.8 PULMONARY NODULES: ICD-10-CM

## 2025-08-18 PROCEDURE — 71250 CT THORAX DX C-: CPT

## 2025-08-18 PROCEDURE — 99214 OFFICE O/P EST MOD 30 MIN: CPT | Performed by: INTERNAL MEDICINE

## 2025-08-18 PROCEDURE — 99204 OFFICE O/P NEW MOD 45 MIN: CPT | Performed by: INTERNAL MEDICINE

## 2025-08-18 ASSESSMENT — PAIN SCALES - GENERAL: PAINLEVEL_OUTOF10: NO PAIN (0)

## (undated) DEVICE — KIT ENDO TURNOVER/PROCEDURE W/CLEAN A SCOPE LINERS 103888

## (undated) RX ORDER — METOPROLOL TARTRATE 1 MG/ML
INJECTION, SOLUTION INTRAVENOUS
Status: DISPENSED
Start: 2025-05-28

## (undated) RX ORDER — SIMETHICONE 40MG/0.6ML
SUSPENSION, DROPS(FINAL DOSAGE FORM)(ML) ORAL
Status: DISPENSED
Start: 2024-04-30

## (undated) RX ORDER — IVABRADINE 5 MG/1
TABLET, FILM COATED ORAL
Status: DISPENSED
Start: 2025-05-28

## (undated) RX ORDER — FENTANYL CITRATE 50 UG/ML
INJECTION, SOLUTION INTRAMUSCULAR; INTRAVENOUS
Status: DISPENSED
Start: 2024-04-30

## (undated) RX ORDER — METOPROLOL TARTRATE 50 MG
TABLET ORAL
Status: DISPENSED
Start: 2025-05-28